# Patient Record
Sex: FEMALE | Race: WHITE | NOT HISPANIC OR LATINO | Employment: OTHER | URBAN - METROPOLITAN AREA
[De-identification: names, ages, dates, MRNs, and addresses within clinical notes are randomized per-mention and may not be internally consistent; named-entity substitution may affect disease eponyms.]

---

## 2017-01-16 ENCOUNTER — ALLSCRIPTS OFFICE VISIT (OUTPATIENT)
Dept: OTHER | Facility: OTHER | Age: 67
End: 2017-01-16

## 2017-01-18 ENCOUNTER — GENERIC CONVERSION - ENCOUNTER (OUTPATIENT)
Dept: OTHER | Facility: OTHER | Age: 67
End: 2017-01-18

## 2017-01-25 ENCOUNTER — ALLSCRIPTS OFFICE VISIT (OUTPATIENT)
Dept: OTHER | Facility: OTHER | Age: 67
End: 2017-01-25

## 2017-01-25 ENCOUNTER — HOSPITAL ENCOUNTER (OUTPATIENT)
Dept: RADIOLOGY | Facility: HOSPITAL | Age: 67
Discharge: HOME | End: 2017-01-25
Payer: MEDICARE

## 2017-01-25 DIAGNOSIS — J20.9 ACUTE BRONCHITIS: ICD-10-CM

## 2017-01-25 PROCEDURE — 71020 HB CHEST X-RAY 2VW FRONTAL&LATL: CPT

## 2017-01-27 ENCOUNTER — GENERIC CONVERSION - ENCOUNTER (OUTPATIENT)
Dept: OTHER | Facility: OTHER | Age: 67
End: 2017-01-27

## 2017-02-14 ENCOUNTER — GENERIC CONVERSION - ENCOUNTER (OUTPATIENT)
Dept: OTHER | Facility: OTHER | Age: 67
End: 2017-02-14

## 2017-02-24 ENCOUNTER — APPOINTMENT (OUTPATIENT)
Dept: LAB | Facility: MEDICAL CENTER | Age: 67
End: 2017-02-24
Payer: MEDICARE

## 2017-02-24 DIAGNOSIS — E11.65 TYPE 2 DIABETES MELLITUS WITH HYPERGLYCEMIA (HCC): ICD-10-CM

## 2017-02-24 DIAGNOSIS — E78.00 PURE HYPERCHOLESTEROLEMIA: ICD-10-CM

## 2017-02-24 DIAGNOSIS — R63.4 ABNORMAL WEIGHT LOSS: ICD-10-CM

## 2017-02-24 DIAGNOSIS — I10 ESSENTIAL (PRIMARY) HYPERTENSION: ICD-10-CM

## 2017-02-24 LAB
ALBUMIN SERPL BCP-MCNC: 3.7 G/DL (ref 3.5–5)
ALP SERPL-CCNC: 57 U/L (ref 46–116)
ALT SERPL W P-5'-P-CCNC: 21 U/L (ref 12–78)
ANION GAP SERPL CALCULATED.3IONS-SCNC: 9 MMOL/L (ref 4–13)
AST SERPL W P-5'-P-CCNC: 10 U/L (ref 5–45)
BILIRUB SERPL-MCNC: 0.56 MG/DL (ref 0.2–1)
BUN SERPL-MCNC: 13 MG/DL (ref 5–25)
CALCIUM SERPL-MCNC: 8.9 MG/DL (ref 8.3–10.1)
CHLORIDE SERPL-SCNC: 104 MMOL/L (ref 100–108)
CHOLEST SERPL-MCNC: 168 MG/DL (ref 50–200)
CO2 SERPL-SCNC: 28 MMOL/L (ref 21–32)
CREAT SERPL-MCNC: 0.73 MG/DL (ref 0.6–1.3)
EST. AVERAGE GLUCOSE BLD GHB EST-MCNC: 194 MG/DL
GFR SERPL CREATININE-BSD FRML MDRD: >60 ML/MIN/1.73SQ M
GLUCOSE SERPL-MCNC: 130 MG/DL (ref 65–140)
HBA1C MFR BLD: 8.4 % (ref 4.2–6.3)
HCV AB SER QL: NORMAL
HDLC SERPL-MCNC: 51 MG/DL (ref 40–60)
LDLC SERPL CALC-MCNC: 84 MG/DL (ref 0–100)
POTASSIUM SERPL-SCNC: 3.6 MMOL/L (ref 3.5–5.3)
PROT SERPL-MCNC: 6.8 G/DL (ref 6.4–8.2)
SODIUM SERPL-SCNC: 141 MMOL/L (ref 136–145)
TRIGL SERPL-MCNC: 165 MG/DL

## 2017-02-24 PROCEDURE — 83036 HEMOGLOBIN GLYCOSYLATED A1C: CPT

## 2017-02-24 PROCEDURE — 80061 LIPID PANEL: CPT

## 2017-02-24 PROCEDURE — 36415 COLL VENOUS BLD VENIPUNCTURE: CPT

## 2017-02-24 PROCEDURE — 86803 HEPATITIS C AB TEST: CPT

## 2017-02-24 PROCEDURE — 80053 COMPREHEN METABOLIC PANEL: CPT

## 2017-03-01 ENCOUNTER — ALLSCRIPTS OFFICE VISIT (OUTPATIENT)
Dept: OTHER | Facility: OTHER | Age: 67
End: 2017-03-01

## 2017-05-17 ENCOUNTER — GENERIC CONVERSION - ENCOUNTER (OUTPATIENT)
Dept: OTHER | Facility: OTHER | Age: 67
End: 2017-05-17

## 2017-05-24 ENCOUNTER — GENERIC CONVERSION - ENCOUNTER (OUTPATIENT)
Dept: OTHER | Facility: OTHER | Age: 67
End: 2017-05-24

## 2017-06-01 DIAGNOSIS — E78.00 PURE HYPERCHOLESTEROLEMIA: ICD-10-CM

## 2017-06-01 DIAGNOSIS — E55.9 VITAMIN D DEFICIENCY: ICD-10-CM

## 2017-06-01 DIAGNOSIS — I10 ESSENTIAL (PRIMARY) HYPERTENSION: ICD-10-CM

## 2017-06-01 DIAGNOSIS — E11.65 TYPE 2 DIABETES MELLITUS WITH HYPERGLYCEMIA (HCC): ICD-10-CM

## 2017-06-09 ENCOUNTER — TRANSCRIBE ORDERS (OUTPATIENT)
Dept: ADMINISTRATIVE | Facility: HOSPITAL | Age: 67
End: 2017-06-09

## 2017-06-09 ENCOUNTER — APPOINTMENT (OUTPATIENT)
Dept: LAB | Facility: MEDICAL CENTER | Age: 67
End: 2017-06-09
Payer: MEDICARE

## 2017-06-09 DIAGNOSIS — E11.65 TYPE 2 DIABETES MELLITUS WITH HYPERGLYCEMIA (HCC): ICD-10-CM

## 2017-06-09 DIAGNOSIS — E78.00 PURE HYPERCHOLESTEROLEMIA: ICD-10-CM

## 2017-06-09 LAB
ALBUMIN SERPL BCP-MCNC: 3.8 G/DL (ref 3.5–5)
ALP SERPL-CCNC: 50 U/L (ref 46–116)
ALT SERPL W P-5'-P-CCNC: 24 U/L (ref 12–78)
ANION GAP SERPL CALCULATED.3IONS-SCNC: 8 MMOL/L (ref 4–13)
AST SERPL W P-5'-P-CCNC: 18 U/L (ref 5–45)
BILIRUB SERPL-MCNC: 0.46 MG/DL (ref 0.2–1)
BUN SERPL-MCNC: 20 MG/DL (ref 5–25)
CALCIUM SERPL-MCNC: 9.2 MG/DL (ref 8.3–10.1)
CHLORIDE SERPL-SCNC: 107 MMOL/L (ref 100–108)
CHOLEST SERPL-MCNC: 125 MG/DL (ref 50–200)
CO2 SERPL-SCNC: 27 MMOL/L (ref 21–32)
CREAT SERPL-MCNC: 0.62 MG/DL (ref 0.6–1.3)
EST. AVERAGE GLUCOSE BLD GHB EST-MCNC: 183 MG/DL
GFR SERPL CREATININE-BSD FRML MDRD: >60 ML/MIN/1.73SQ M
GLUCOSE P FAST SERPL-MCNC: 80 MG/DL (ref 65–99)
HBA1C MFR BLD: 8 % (ref 4.2–6.3)
HDLC SERPL-MCNC: 42 MG/DL (ref 40–60)
LDLC SERPL CALC-MCNC: 67 MG/DL (ref 0–100)
POTASSIUM SERPL-SCNC: 3.9 MMOL/L (ref 3.5–5.3)
PROT SERPL-MCNC: 7.1 G/DL (ref 6.4–8.2)
SODIUM SERPL-SCNC: 142 MMOL/L (ref 136–145)
TRIGL SERPL-MCNC: 78 MG/DL

## 2017-06-09 PROCEDURE — 80053 COMPREHEN METABOLIC PANEL: CPT

## 2017-06-09 PROCEDURE — 83036 HEMOGLOBIN GLYCOSYLATED A1C: CPT

## 2017-06-09 PROCEDURE — 80061 LIPID PANEL: CPT

## 2017-06-09 PROCEDURE — 36415 COLL VENOUS BLD VENIPUNCTURE: CPT

## 2017-06-14 ENCOUNTER — ALLSCRIPTS OFFICE VISIT (OUTPATIENT)
Dept: OTHER | Facility: OTHER | Age: 67
End: 2017-06-14

## 2017-07-13 ENCOUNTER — GENERIC CONVERSION - ENCOUNTER (OUTPATIENT)
Dept: OTHER | Facility: OTHER | Age: 67
End: 2017-07-13

## 2017-08-31 ENCOUNTER — GENERIC CONVERSION - ENCOUNTER (OUTPATIENT)
Dept: OTHER | Facility: OTHER | Age: 67
End: 2017-08-31

## 2017-08-31 ENCOUNTER — CONVERSION ENCOUNTER (OUTPATIENT)
Dept: MAMMOGRAPHY | Facility: CLINIC | Age: 67
End: 2017-08-31

## 2017-09-14 DIAGNOSIS — I10 ESSENTIAL (PRIMARY) HYPERTENSION: ICD-10-CM

## 2017-09-14 DIAGNOSIS — E11.65 TYPE 2 DIABETES MELLITUS WITH HYPERGLYCEMIA (HCC): ICD-10-CM

## 2017-09-14 DIAGNOSIS — E78.00 PURE HYPERCHOLESTEROLEMIA: ICD-10-CM

## 2017-09-19 ENCOUNTER — GENERIC CONVERSION - ENCOUNTER (OUTPATIENT)
Dept: OTHER | Facility: OTHER | Age: 67
End: 2017-09-19

## 2017-10-02 ENCOUNTER — TRANSCRIBE ORDERS (OUTPATIENT)
Dept: ADMINISTRATIVE | Facility: HOSPITAL | Age: 67
End: 2017-10-02

## 2017-10-02 ENCOUNTER — APPOINTMENT (OUTPATIENT)
Dept: LAB | Facility: MEDICAL CENTER | Age: 67
End: 2017-10-02
Payer: MEDICARE

## 2017-10-02 DIAGNOSIS — E78.00 PURE HYPERCHOLESTEROLEMIA: Primary | ICD-10-CM

## 2017-10-02 DIAGNOSIS — E78.00 PURE HYPERCHOLESTEROLEMIA: ICD-10-CM

## 2017-10-02 DIAGNOSIS — E11.65 TYPE 2 DIABETES MELLITUS WITH HYPERGLYCEMIA (HCC): ICD-10-CM

## 2017-10-02 DIAGNOSIS — I10 ESSENTIAL (PRIMARY) HYPERTENSION: ICD-10-CM

## 2017-10-02 LAB
ALBUMIN SERPL BCP-MCNC: 3.8 G/DL (ref 3.5–5)
ALP SERPL-CCNC: 49 U/L (ref 46–116)
ALT SERPL W P-5'-P-CCNC: 14 U/L (ref 12–78)
ANION GAP SERPL CALCULATED.3IONS-SCNC: 6 MMOL/L (ref 4–13)
AST SERPL W P-5'-P-CCNC: 10 U/L (ref 5–45)
BILIRUB SERPL-MCNC: 0.4 MG/DL (ref 0.2–1)
BUN SERPL-MCNC: 14 MG/DL (ref 5–25)
CALCIUM SERPL-MCNC: 8.9 MG/DL (ref 8.3–10.1)
CHLORIDE SERPL-SCNC: 104 MMOL/L (ref 100–108)
CHOLEST SERPL-MCNC: 127 MG/DL (ref 50–200)
CO2 SERPL-SCNC: 27 MMOL/L (ref 21–32)
CREAT SERPL-MCNC: 0.65 MG/DL (ref 0.6–1.3)
EST. AVERAGE GLUCOSE BLD GHB EST-MCNC: 154 MG/DL
GFR SERPL CREATININE-BSD FRML MDRD: 93 ML/MIN/1.73SQ M
GLUCOSE P FAST SERPL-MCNC: 88 MG/DL (ref 65–99)
HBA1C MFR BLD: 7 % (ref 4.2–6.3)
HDLC SERPL-MCNC: 48 MG/DL (ref 40–60)
LDLC SERPL CALC-MCNC: 61 MG/DL (ref 0–100)
POTASSIUM SERPL-SCNC: 3.8 MMOL/L (ref 3.5–5.3)
PROT SERPL-MCNC: 7.1 G/DL (ref 6.4–8.2)
SODIUM SERPL-SCNC: 137 MMOL/L (ref 136–145)
TRIGL SERPL-MCNC: 92 MG/DL

## 2017-10-02 PROCEDURE — 80053 COMPREHEN METABOLIC PANEL: CPT

## 2017-10-02 PROCEDURE — 36415 COLL VENOUS BLD VENIPUNCTURE: CPT

## 2017-10-02 PROCEDURE — 80061 LIPID PANEL: CPT

## 2017-10-02 PROCEDURE — 83036 HEMOGLOBIN GLYCOSYLATED A1C: CPT

## 2017-10-06 ENCOUNTER — ALLSCRIPTS OFFICE VISIT (OUTPATIENT)
Dept: OTHER | Facility: OTHER | Age: 67
End: 2017-10-06

## 2017-12-20 ENCOUNTER — GENERIC CONVERSION - ENCOUNTER (OUTPATIENT)
Dept: OTHER | Facility: OTHER | Age: 67
End: 2017-12-20

## 2018-01-09 NOTE — RESULT NOTES
Message   Recorded as Task   Date: 01/27/2017 08:19 AM, Created By: Mauricio Rodriguez   Task Name: Follow Up   Assigned To: Nicol Franks   Regarding Patient: Chavo Brown, Status: Active   CommentGypsy Wildwood - 27 Jan 2017 8:19 AM     TASK CREATED  Chest xray didn't show any pneumonia  Hope the inhaler is helping her  KristoferAshleigh - 27 Jan 2017 3:29 PM     TASK EDITED  Spoke to the patient's  told him that the patient's x-ray came back negative for pneumonia  Patient's  said he would relay the message

## 2018-01-09 NOTE — MISCELLANEOUS
Message   Date: 13 Jul 2017 2:52 PM EST, Recorded By: Gogo Greer For: Don Guerra   Caller: Valery Leach, Self   Phone: [phone] (Home), [phone] (Work)   Reason: Medical Complaint   home blood sugar results have been good <100, 2x 102 & 105, instructed to decrease Lantus to 30U HS, will continue to monitor blood sugars at home        Active Problems    1  Allergic rhinitis (477 9) (J30 9)   2  Cervical cancer screening (V76 2) (Z12 4)   3  Colonoscopy (Fiberoptic) Screening   4  Encounter for screening mammogram for malignant neoplasm of breast (V76 12)   (Z12 31)   5  Fibrocystic breast disease, unspecified laterality (610 1) (N60 19)   6  Gastroesophageal reflux disease with esophagitis (530 11) (K21 0)   7  Glaucoma screening (V80 1) (Z13 5)   8  Hypercholesterolemia (272 0) (E78 00)   9  Hypertension (401 9) (I10)   10  Need for pneumococcal vaccination (V03 82) (Z23)   11  Screening for diabetic retinopathy (V80 2) (Z13 5)   12  Screening for genitourinary condition (V81 6) (Z13 89)   13  Type 2 diabetes mellitus with hyperglycemia (250 00) (E11 65)   14  Vitamin D deficiency (268 9) (E55 9)    Current Meds   1  FreeStyle Freedom Lite w/Device Kit; TEST BS BID; Therapy: 80Nce7518 to (Evaluate:22Oij5969)  Requested for: 01Aug2013; Last   Rx:01Aug2013 Ordered   2  FreeStyle Lancets Miscellaneous; TEST BS BID; Therapy: 30Eww6044 to (Evaluate:23Jan2017)  Requested for: 59Csc2371; Last   Rx:95Gzw7698 Ordered   3  FreeStyle Lite Test In Vitro Strip; TEST BLOOD SUGARS BID; Therapy: 03Wqh6934 to (Evaluate:23Jan2017)  Requested for: 27Efs6429; Last   Rx:88Geb4585 Ordered   4  Januvia 100 MG Oral Tablet; TAKE 1 TABLET DAILY; Therapy: 49WYU7214 to (Evaluate:32Phc2110)  Requested for: 61XKZ8781; Last   Rx:11Jan2017 Ordered   5  Lantus SoloStar 100 UNIT/ML Subcutaneous Solution Pen-injector; INJECT 30 UNITs at    Bedtime;    Therapy: 77KTU3693 to ([phone])  Requested for: 02VTB2343 Recorded   6  Lisinopril-Hydrochlorothiazide 20-12 5 MG Oral Tablet; Take 1 tablet daily  Requested   for: 69Pph0929; Last Rx:65Xqp6343 Ordered   7  Meclizine HCl - 12 5 MG Oral Tablet; TAKE 3 TABLETS Q6H PRN  Requested for:   89GXV9825; Last Rx:31Ekr1564 Ordered   8  MetFORMIN HCl - 500 MG Oral Tablet; TAKE 2 TABLETS TWICE DAILY  Requested for:   10LOZ0393; Last Rx:20Xmx3766 Ordered   9  Pen Needles 5/16" 31G X 8 MM Miscellaneous; inject Lantus at bedtime; Therapy: 44YFQ9702 to (Evaluate:57Zon7661)  Requested for: 42JHA1616; Last   Rx:71Utu6641 Ordered   10  Rosuvastatin Calcium 10 MG Oral Tablet; Take 1 tablet daily; Therapy: 00DXT1546 to (Evaluate:10Kqp4810)  Requested for: 40BOS1479; Last    AR:98FQS6071 Ordered   11  Tylenol TABS; Therapy: (Jessi Fresh) to Recorded    Allergies    1   Actos TABS    Signatures   Electronically signed by : HAN Luna ; Jul 19 2017  9:18AM EST                       (Author)

## 2018-01-11 NOTE — MISCELLANEOUS
Message   Date: 02 Dec 2016 11:12 AM EST, Recorded By: Kojo Dean For: MariDon   Caller: Marciano Burch, Self   Phone: [phone] (Home), [phone] (Work)   Reason: General Medical Question    Patient called stating that she took one Pravastatin and she had terrible pain in her legs  She does not want to continue on this  She wanted to see if her insurance would allow her to go back on Crestor  She is in the donut hole and that is why this had to be switched  Patient checked with her insurance and they said if she gets the Generic of Crestor it would only cost $12      As per Dr Saba Thomas, it would be okay to switch the Pravastatin to Rosuvastatin 10mg daily      Pt understood        Active Problems    1  Abnormal weight loss (783 21) (R63 4)   2  Allergic rhinitis (477 9) (J30 9)   3  Cervical cancer screening (V76 2) (Z12 4)   4  Colonoscopy (Fiberoptic) Screening   5  Encounter for screening mammogram for malignant neoplasm of breast (V76 12)   (Z12 31)   6  Fibrocystic breast disease, unspecified laterality (610 1) (N60 19)   7  Gastroesophageal reflux disease with esophagitis (530 11) (K21 0)   8  Glaucoma screening (V80 1) (Z13 5)   9  Hypercholesterolemia (272 0) (E78 00)   10  Hypertension (401 9) (I10)   11  Injury of foot (959 7) (S99 929A)   12  Injury of left hand (959 4) (S69 92XA)   13  Need for prophylactic vaccination and inoculation against influenza (V04 81) (Z23)   14  Screening for diabetic retinopathy (V80 2) (Z13 5)   15  Screening for genitourinary condition (V81 6) (Z13 89)   16  Type 2 diabetes mellitus with hyperglycemia (250 00) (E11 65)   17  Vitamin D deficiency (268 9) (E55 9)    Current Meds   1  FreeStyle Freedom Lite w/Device Kit; TEST BS BID; Therapy: 34Att4597 to (Evaluate:31Aug2013)  Requested for: 01Aug2013; Last   Rx:77Qjr5516 Ordered   2  FreeStyle Lancets Miscellaneous; TEST BS BID;    Therapy: 61Bsr5701 to (Evaluate:23Jan2017)  Requested for: 74Sev5302; Last   Rx:88Yeu5126 Ordered   3  FreeStyle Lite Test In Vitro Strip; TEST BLOOD SUGARS BID; Therapy: 94Vva4903 to (Evaluate:23Jan2017)  Requested for: 87Ddf3278; Last   Rx:00Asa1762 Ordered   4  Januvia 100 MG Oral Tablet; TAKE 1 TABLET DAILY; Therapy: 35TCT2784 to (Evaluate:10Jan2017)  Requested for: 85VFZ6818; Last   Rx:74Vfp2988 Ordered   5  Lantus SoloStar 100 UNIT/ML Subcutaneous Solution Pen-injector; INJECT 35 UNITS   AT  Bedtime; Therapy: 02IVT0150 to (Evaluate:18Feb2017)  Requested for: 48Qlw4733; Last   Rx:38Wmx6818 Ordered   6  Lisinopril-Hydrochlorothiazide 20-12 5 MG Oral Tablet; Take 1 tablet daily  Requested   for: 75VUH9750; Last Rx:04Roe1060; Status: ACTIVE - Transmit to [facility] Ordered   7  Meclizine HCl - 12 5 MG Oral Tablet; TAKE 3 TABLETS Q6H PRN  Requested for:   27CYI7476; Last Rx:26Nov2014 Ordered   8  MetFORMIN HCl - 500 MG Oral Tablet; TAKE 2 TABLETS TWICE DAILY  Requested for:   28Apr2016; Last Rx:28Apr2016 Ordered   9  Mupirocin 2 % External Ointment; APPLY SPARINGLY TO AFFECTED AREA(S) TWICE   DAILY; Therapy: 90MFH2196 to (Last Oxana Killings)  Requested for: 26Oct2016 Ordered   10  Pen Needles 5/16" 31G X 8 MM Miscellaneous; inject Lantus at bedtime; Therapy: 95YEP9096 to (26 [phone])  Requested for: 81LAO0590; Last    Rx:14Nov2016 Ordered   11  Pravastatin Sodium 40 MG Oral Tablet; TAKE 1 TABLET DAILY; Therapy: 93IXH2410 to (Viraj Smith)  Requested for: 23Nov2016; Last    Rx:23Nov2016 Ordered   12  Tylenol TABS; Therapy: (Louis Elise) to Recorded   13  Vitamin D 2000 UNIT Oral Capsule; take 1 capsule once daily; Therapy: 17JRK2247 to (Evaluate:28Nov2013) Recorded    Allergies    1  Actos TABS    Plan  Hypercholesterolemia    · Pravastatin Sodium 40 MG Oral Tablet   · Rosuvastatin Calcium 10 MG Oral Tablet;  Take 1 tablet daily    Signatures   Electronically signed by : HAN Noble ; Dec  5 2016 9:37AM EST                       (Author)

## 2018-01-11 NOTE — RESULT NOTES
Message  I spoke to her regarding 5mm foreign body (sea urchin tentacle) in left 3rd digit, she notes her foot pain is improved and the hand pain is unchanged   Our staff spoke to Dr Angel Quintanilla office and they were able to coordinate an office visit with the patient for the management of the same      Plan  Injury of left hand    · 2 - Adonis Austin MD, Henrry Orozco  (Orthopedic Surgery) Physician Referral  Consult  Status: Active   Requested for: 33VFC2900  Care Summary provided  : Yes    Signatures   Electronically signed by : HAN Flores ; Oct 28 2016  1:32PM EST                       (Author)

## 2018-01-13 VITALS
WEIGHT: 158.25 LBS | DIASTOLIC BLOOD PRESSURE: 60 MMHG | RESPIRATION RATE: 15 BRPM | SYSTOLIC BLOOD PRESSURE: 112 MMHG | TEMPERATURE: 99.1 F | HEART RATE: 80 BPM | BODY MASS INDEX: 29.88 KG/M2 | HEIGHT: 61 IN

## 2018-01-13 VITALS
DIASTOLIC BLOOD PRESSURE: 68 MMHG | HEART RATE: 82 BPM | SYSTOLIC BLOOD PRESSURE: 123 MMHG | WEIGHT: 150 LBS | TEMPERATURE: 98 F | HEIGHT: 61 IN | BODY MASS INDEX: 28.32 KG/M2 | RESPIRATION RATE: 15 BRPM

## 2018-01-13 VITALS
TEMPERATURE: 98.6 F | SYSTOLIC BLOOD PRESSURE: 130 MMHG | DIASTOLIC BLOOD PRESSURE: 70 MMHG | BODY MASS INDEX: 30.3 KG/M2 | RESPIRATION RATE: 15 BRPM | HEART RATE: 100 BPM | WEIGHT: 160.5 LBS | HEIGHT: 61 IN

## 2018-01-13 VITALS
DIASTOLIC BLOOD PRESSURE: 78 MMHG | WEIGHT: 154 LBS | BODY MASS INDEX: 29.07 KG/M2 | RESPIRATION RATE: 15 BRPM | TEMPERATURE: 98.6 F | HEART RATE: 84 BPM | SYSTOLIC BLOOD PRESSURE: 110 MMHG | HEIGHT: 61 IN

## 2018-01-14 VITALS
BODY MASS INDEX: 30.21 KG/M2 | DIASTOLIC BLOOD PRESSURE: 60 MMHG | RESPIRATION RATE: 15 BRPM | HEIGHT: 61 IN | TEMPERATURE: 99.4 F | SYSTOLIC BLOOD PRESSURE: 128 MMHG | HEART RATE: 82 BPM | WEIGHT: 160 LBS

## 2018-01-15 ENCOUNTER — GENERIC CONVERSION - ENCOUNTER (OUTPATIENT)
Dept: OTHER | Facility: OTHER | Age: 68
End: 2018-01-15

## 2018-01-15 NOTE — MISCELLANEOUS
Message     Date: 18 Jan 2017 1:49 PM EST, Recorded By: Yessica Kauffman For: Trav Paisley: Gomez Lee, Self   Phone: [phone] (Home), [phone] (Work)   Reason: Medical Complaint   Patient seen in offfice Monday, now has fever of 99 3 and last night 101, cough and congestion continue and feels like going into chest       Dr Bebe Bustillos will prescribe a Z-Karl which will be sent to pharmacy  Patient will  medication and take as directed  Active Problems    1  Abnormal weight loss (783 21) (R63 4)   2  Acute upper respiratory infection (465 9) (J06 9)   3  Allergic rhinitis (477 9) (J30 9)   4  Cervical cancer screening (V76 2) (Z12 4)   5  Colonoscopy (Fiberoptic) Screening   6  Encounter for screening mammogram for malignant neoplasm of breast (V76 12)   (Z12 31)   7  Fibrocystic breast disease, unspecified laterality (610 1) (N60 19)   8  Gastroesophageal reflux disease with esophagitis (530 11) (K21 0)   9  Glaucoma screening (V80 1) (Z13 5)   10  Hypercholesterolemia (272 0) (E78 00)   11  Hypertension (401 9) (I10)   12  Injury of foot (959 7) (S99 929A)   13  Injury of left hand (959 4) (S69 92XA)   14  Need for prophylactic vaccination and inoculation against influenza (V04 81) (Z23)   15  Screening for diabetic retinopathy (V80 2) (Z13 5)   16  Screening for genitourinary condition (V81 6) (Z13 89)   17  Type 2 diabetes mellitus with hyperglycemia (250 00) (E11 65)   18  Vitamin D deficiency (268 9) (E55 9)    Current Meds   1  Azithromycin 250 MG Oral Tablet; TAKE 2 TABLETS ON DAY 1 THEN TAKE 1 TABLET A   DAY FOR 4 DAYS; Therapy: 65MGL2091 to (GMVLOMVU:72ZEB3724)  Requested for: 34USP9156; Last   Rx:18Jan2017 Ordered   2  Fluticasone Propionate 50 MCG/ACT Nasal Suspension; use 2 sprays in each nostril   once daily; Therapy: 06KLB8977 to (Last Rx:16Jan2017)  Requested for: 44CFS8846 Ordered   3  FreeStyle Freedom Lite w/Device Kit; TEST BS BID;    Therapy: 95ARP6967 to (Evaluate:31Aug2013)  Requested for: 01Aug2013; Last   Rx:01Aug2013 Ordered   4  FreeStyle Lancets Miscellaneous; TEST BS BID; Therapy: 01Aug2013 to (Evaluate:23Jan2017)  Requested for: 57Agl2101; Last   Rx:92Asu6946 Ordered   5  FreeStyle Lite Test In Vitro Strip; TEST BLOOD SUGARS BID; Therapy: 01Aug2013 to (Evaluate:23Jan2017)  Requested for: 48Kik5646; Last   Rx:36Acm0769 Ordered   6  Januvia 100 MG Oral Tablet; TAKE 1 TABLET DAILY; Therapy: 35EBF2824 to (Evaluate:10Jul2017)  Requested for: 66JMW7025; Last   Rx:11Jan2017 Ordered   7  Lantus SoloStar 100 UNIT/ML Subcutaneous Solution Pen-injector; INJECT 35 UNITS   AT  Bedtime; Therapy: 36HFX7404 to (Evaluate:18Feb2017)  Requested for: 10Zwp1626; Last   Rx:02Aug2016 Ordered   8  Lisinopril-Hydrochlorothiazide 20-12 5 MG Oral Tablet; Take 1 tablet daily  Requested   for: 90AYF1648; Last Rx:53Vgq2595; Status: ACTIVE - Transmit to [facility] Ordered   9  Meclizine HCl - 12 5 MG Oral Tablet; TAKE 3 TABLETS Q6H PRN  Requested for:   70MMM1223; Last Rx:26Nov2014 Ordered   10  MetFORMIN HCl - 500 MG Oral Tablet; TAKE 2 TABLETS TWICE DAILY  Requested for:    32CLL1713; Last Rx:37Ygf3403 Ordered   11  Pen Needles 5/16" 31G X 8 MM Miscellaneous; inject Lantus at bedtime; Therapy: 90LSU8125 to (Jory Ukrainian)  Requested for: 09DSA6407; Last    Rx:14Nov2016 Ordered   12  Rosuvastatin Calcium 10 MG Oral Tablet; Take 1 tablet daily; Therapy: 70MJL1229 to (Evorn Found)  Requested for: 94HTD9956; Last    Rx:07Ayc1509 Ordered   13  Tylenol TABS; Therapy: (Roly Matas) to Recorded    Allergies    1   Actos TABS    Signatures   Electronically signed by : HAN Nelson ; Jan 18 2017  3:44PM EST                       (Author)

## 2018-01-16 NOTE — MISCELLANEOUS
Message   Date: 24 May 2017 4:43 PM EST, Recorded By: Yusuf iDaz For: Don Guerra   Caller: Gomez Lee, Self   Phone: [phone] (Home), [phone] (Work)   Reason: Medical Complaint   while using rowing machine at the gym slid off the back, hit her butt on the bar & rolled to the side  buttocks area is sore, no sign of bruising  will watch for a few days, will hold off going to the gym for now, if no better will call back to make an appointment  Active Problems    1  Allergic rhinitis (477 9) (J30 9)   2  Cervical cancer screening (V76 2) (Z12 4)   3  Colonoscopy (Fiberoptic) Screening   4  Encounter for screening mammogram for malignant neoplasm of breast (V76 12)   (Z12 31)   5  Fibrocystic breast disease, unspecified laterality (610 1) (N60 19)   6  Gastroesophageal reflux disease with esophagitis (530 11) (K21 0)   7  Glaucoma screening (V80 1) (Z13 5)   8  Hypercholesterolemia (272 0) (E78 00)   9  Hypertension (401 9) (I10)   10  Need for pneumococcal vaccination (V03 82) (Z23)   11  Screening for diabetic retinopathy (V80 2) (Z13 5)   12  Screening for genitourinary condition (V81 6) (Z13 89)   13  Type 2 diabetes mellitus with hyperglycemia (250 00) (E11 65)   14  Vitamin D deficiency (268 9) (E55 9)    Current Meds   1  FreeStyle Freedom Lite w/Device Kit; TEST BS BID; Therapy: 01Aug2013 to (Evaluate:31Aug2013)  Requested for: 01Aug2013; Last   Rx:01Aug2013 Ordered   2  FreeStyle Lancets Miscellaneous; TEST BS BID; Therapy: 01Aug2013 to (Evaluate:23Jan2017)  Requested for: 66Qtp6435; Last   Rx:79Pnp0958 Ordered   3  FreeStyle Lite Test In Vitro Strip; TEST BLOOD SUGARS BID; Therapy: 01Aug2013 to (Evaluate:23Jan2017)  Requested for: 42Qin1381; Last   Rx:59Zqm3059 Ordered   4  Januvia 100 MG Oral Tablet; TAKE 1 TABLET DAILY; Therapy: 18XZP8250 to (Evaluate:81Uwk7437)  Requested for: 95FCS9905; Last   Rx:11Jan2017 Ordered   5   Lantus SoloStar 100 UNIT/ML Subcutaneous Solution Pen-injector; INJECT 40 UNITS   AT  Bedtime; Therapy: 31NWI6253 to [phone])  Requested for: 20Mar2017; Last   Rx:20Mar2017 Ordered   6  Lisinopril-Hydrochlorothiazide 20-12 5 MG Oral Tablet; Take 1 tablet daily  Requested   for: 14Ydu0965; Last Rx:09Nte6207 Ordered   7  Meclizine HCl - 12 5 MG Oral Tablet; TAKE 3 TABLETS Q6H PRN  Requested for:   54XYD3667; Last Rx:26Nov2014 Ordered   8  MetFORMIN HCl - 500 MG Oral Tablet; TAKE 2 TABLETS TWICE DAILY  Requested for:   14LVH3221; Last Rx:61Qku6209 Ordered   9  Pen Needles 5/16" 31G X 8 MM Miscellaneous; inject Lantus at bedtime; Therapy: 25HDC4810 to (Evaluate:70Wsk9122)  Requested for: 62HNT3019; Last   Rx:82Vnr3860 Ordered   10  Rosuvastatin Calcium 10 MG Oral Tablet; Take 1 tablet daily; Therapy: 44NRA0444 to (Terri Tineo)  Requested for: 33TOA1127; Last    Rx:20Sdx1497 Ordered   11  Tylenol TABS; Therapy: (Jen Galindo) to Recorded    Allergies    1   Actos TABS    Signatures   Electronically signed by : HAN Snyder ; May 30 2017  9:01AM EST                       (Author)

## 2018-01-23 NOTE — MISCELLANEOUS
Message   Date: 15 Benji 2018 1:14 PM EST, Recorded By: Kory García For: MariDon   Caller: Troy Villar, Self   Phone: [phone] (Home), [phone] (Work)   Reason: Medical Complaint   patient states that she has had fever the last few days, cough, congestion and bodyache  Not feeling well at all      As per Dr Taye Pham, we can see her this morning for an acute appointment  Tried calling patient several times but she did not call back until after 1pm   She was instructed to go to the Urgent Care today for evaluation  She understood        Active Problems    1  Allergic rhinitis (477 9) (J30 9)   2  Cervical cancer screening (V76 2) (Z12 4)   3  Colonoscopy (Fiberoptic) Screening   4  Encounter for screening mammogram for malignant neoplasm of breast (V76 12)   (Z12 31)   5  Fibrocystic breast disease, unspecified laterality (610 1) (N60 19)   6  Gastroesophageal reflux disease with esophagitis (530 11) (K21 0)   7  Glaucoma screening (V80 1) (Z13 5)   8  Hypercholesterolemia (272 0) (E78 00)   9  Hypertension (401 9) (I10)   10  Need for pneumococcal vaccination (V03 82) (Z23)   11  Need for prophylactic vaccination and inoculation against influenza (V04 81) (Z23)   12  Screening for diabetic retinopathy (V80 2) (Z13 5)   13  Screening for genitourinary condition (V81 6) (Z13 89)   14  Type 2 diabetes mellitus with hyperglycemia (250 00) (E11 65)   15  Vitamin D deficiency (268 9) (E55 9)    Current Meds   1  FreeStyle Freedom Lite w/Device Kit; TEST BS BID; Therapy: 43Oig6732 to (Evaluate:28Kaw0694)  Requested for: 55Pev3948; Last   Rx:82Ert1983 Ordered   2  FreeStyle Lancets Miscellaneous; TEST BS BID; Therapy: 20Hxl0172 to (Evaluate:23Jan2017)  Requested for: 95Lph0643; Last   Rx:13Ndf3695 Ordered   3  FreeStyle Lite Test In Vitro Strip; TEST BLOOD SUGARS BID; Therapy: 23Fnl0302 to (Evaluate:20Jan2018)  Requested for: 28Vks7459; Last   Rx:07Ypt0635 Ordered   4   Januvia 100 MG Oral Tablet; TAKE 1 TABLET DAILY; Therapy: 92JPB8468 to (Evaluate:79Vhn6520)  Requested for: 59MPP1421; Last   Rx:12Jan2018 Ordered   5  Lantus SoloStar 100 UNIT/ML Subcutaneous Solution Pen-injector; INJECT 30 UNITs at    Bedtime; Therapy: 05KED1859 to (Evaluate:44Ewm0929)  Requested for: 01ILW6408; Last   Rx:02Nov2017 Ordered   6  Lisinopril-Hydrochlorothiazide 20-12 5 MG Oral Tablet; Take 1 tablet daily  Requested   for: 30Rtl2839; Last Rx:12Rdy4014 Ordered   7  Meclizine HCl - 12 5 MG Oral Tablet; TAKE 3 TABLETS Q6H PRN  Requested for:   62IBX8291; Last Rx:26Nov2014 Ordered   8  MetFORMIN HCl - 500 MG Oral Tablet; TAKE 2 TABLETS TWICE DAILY  Requested for:   32EBH0438; Last Rx:30Nov2017 Ordered   9  Pen Needles 5/16" 31G X 8 MM Miscellaneous; inject Lantus at bedtime; Therapy: 75JRU4893 to (Evaluate:01Apr2018)  Requested for: 65UKQ5723; Last   Rx:02Nov2017 Ordered   10  Rosuvastatin Calcium 10 MG Oral Tablet; Take 1 tablet daily; Therapy: 35VPI5747 to (Evaluate:56Jla3636)  Requested for: 56TAZ9896; Last    SP:95DZP5186 Ordered   11  Tylenol TABS; Therapy: (Afia Watt) to Recorded    Allergies    1   Actos TABS    Signatures   Electronically signed by : HAN Linn ; Benji 15 2018  5:13PM EST                       (Author)

## 2018-01-29 ENCOUNTER — TELEPHONE (OUTPATIENT)
Dept: INTERNAL MEDICINE CLINIC | Facility: CLINIC | Age: 68
End: 2018-01-29

## 2018-01-29 DIAGNOSIS — J01.00 ACUTE MAXILLARY SINUSITIS, RECURRENCE NOT SPECIFIED: Primary | ICD-10-CM

## 2018-01-29 RX ORDER — AMOXICILLIN AND CLAVULANATE POTASSIUM 875; 125 MG/1; MG/1
1 TABLET, FILM COATED ORAL EVERY 12 HOURS SCHEDULED
Qty: 14 TABLET | Refills: 0 | Status: SHIPPED | OUTPATIENT
Start: 2018-01-29 | End: 2018-02-05

## 2018-01-29 NOTE — TELEPHONE ENCOUNTER
Suzette Otero called complaining of headache, sinus pressure, and painful teeth and gums  She went to the dentist last week, and was told that her gums and teeth are fine  She's been taking Mucinex with no relief  Dr Deidra Coburn sent Augmentin BID for 7 days to the pharmacy

## 2018-02-09 ENCOUNTER — APPOINTMENT (OUTPATIENT)
Dept: LAB | Facility: MEDICAL CENTER | Age: 68
End: 2018-02-09
Payer: MEDICARE

## 2018-02-09 ENCOUNTER — TRANSCRIBE ORDERS (OUTPATIENT)
Dept: ADMINISTRATIVE | Facility: HOSPITAL | Age: 68
End: 2018-02-09

## 2018-02-09 PROCEDURE — 80053 COMPREHEN METABOLIC PANEL: CPT | Performed by: INTERNAL MEDICINE

## 2018-02-09 PROCEDURE — 36415 COLL VENOUS BLD VENIPUNCTURE: CPT | Performed by: INTERNAL MEDICINE

## 2018-02-09 PROCEDURE — 83036 HEMOGLOBIN GLYCOSYLATED A1C: CPT | Performed by: INTERNAL MEDICINE

## 2018-02-12 ENCOUNTER — OFFICE VISIT (OUTPATIENT)
Dept: INTERNAL MEDICINE CLINIC | Facility: CLINIC | Age: 68
End: 2018-02-12
Payer: MEDICARE

## 2018-02-12 VITALS
RESPIRATION RATE: 15 BRPM | DIASTOLIC BLOOD PRESSURE: 80 MMHG | TEMPERATURE: 98.3 F | HEIGHT: 61 IN | SYSTOLIC BLOOD PRESSURE: 138 MMHG | BODY MASS INDEX: 29.57 KG/M2 | WEIGHT: 156.6 LBS | HEART RATE: 84 BPM

## 2018-02-12 DIAGNOSIS — E55.9 VITAMIN D DEFICIENCY: ICD-10-CM

## 2018-02-12 DIAGNOSIS — E11.65 TYPE 2 DIABETES MELLITUS WITH HYPERGLYCEMIA, WITH LONG-TERM CURRENT USE OF INSULIN (HCC): ICD-10-CM

## 2018-02-12 DIAGNOSIS — Z79.4 TYPE 2 DIABETES MELLITUS WITH HYPERGLYCEMIA, WITH LONG-TERM CURRENT USE OF INSULIN (HCC): ICD-10-CM

## 2018-02-12 DIAGNOSIS — I10 ESSENTIAL HYPERTENSION: Primary | ICD-10-CM

## 2018-02-12 DIAGNOSIS — K21.00 GASTROESOPHAGEAL REFLUX DISEASE WITH ESOPHAGITIS: ICD-10-CM

## 2018-02-12 DIAGNOSIS — E78.00 HYPERCHOLESTEROLEMIA: ICD-10-CM

## 2018-02-12 PROCEDURE — 99214 OFFICE O/P EST MOD 30 MIN: CPT | Performed by: INTERNAL MEDICINE

## 2018-02-12 RX ORDER — LISINOPRIL AND HYDROCHLOROTHIAZIDE 20; 12.5 MG/1; MG/1
1 TABLET ORAL DAILY
COMMUNITY
End: 2018-03-06 | Stop reason: SDUPTHER

## 2018-02-12 RX ORDER — ACETAMINOPHEN 325 MG/1
TABLET ORAL
COMMUNITY
End: 2018-07-19

## 2018-02-12 RX ORDER — PEN NEEDLE, DIABETIC 31 GX5/16"
NEEDLE, DISPOSABLE MISCELLANEOUS DAILY
Qty: 100 EACH | Refills: 2 | Status: SHIPPED | OUTPATIENT
Start: 2018-02-12 | End: 2018-07-19

## 2018-02-12 RX ORDER — ROSUVASTATIN CALCIUM 10 MG/1
1 TABLET, COATED ORAL DAILY
COMMUNITY
Start: 2016-12-02 | End: 2018-03-06 | Stop reason: SDUPTHER

## 2018-02-12 RX ORDER — PEN NEEDLE, DIABETIC 31 GX5/16"
NEEDLE, DISPOSABLE MISCELLANEOUS
COMMUNITY
Start: 2015-02-25 | End: 2018-02-12 | Stop reason: SDUPTHER

## 2018-02-12 NOTE — PROGRESS NOTES
[facility] [place]      NAME: Elisa Mcdaniel  AGE: 78 y o  SEX: female  : 1950   MRN: [id number]    DATE: 2018  TIME: 4:28 PM    Assessment and Plan   1  Essential hypertension    Well controlled  - CBC and differential; Future  - Comprehensive metabolic panel    2  Hypercholesterolemia   well controlled  - Lipid panel    3  Type 2 diabetes mellitus with hyperglycemia, with long-term current use of insulin (HCC)   adequately controlled  The patient has to change from Lantus because of formulary issues  She will be started on Tresiba  - insulin degludec (TRESIBA) injection pen 100 units/mL; Inject 30 Units under the skin daily  Dispense: 5 pen; Refill: 5  - Insulin Pen Needle (PEN NEEDLES 31GX5/16") 31G X 8 MM MISC; by Does not apply route daily  Dispense: 100 each; Refill: 2  - Hemoglobin A1c; Future    4  Gastroesophageal reflux disease with esophagitis    Minimally symptomatic at present  5  Vitamin D deficiency    Not currently on replacement, check level  - Vitamin D 25 hydroxy     the patient is doing generally well  Her blood pressure and lipids are good  Her diabetic control is reasonable  She is exercising more regularly than she had been  She is trying to watch her diet  She will continue her current therapy and return for follow-up in 3 months  Return to office in: Three months    Chief Complaint    routine follow-up    History of Present Illness      the patient returned to the office for re-evaluation of type 2 diabetes, hypertension, and hypercholesterolemia  She has been feeling pretty well  She has no chest pain, shortness of breath, palpitations, or dizziness  She has had no symptoms ofhypoglycemia or hyperglycemia  She is tolerating her medications well      The following portions of the patient's history were reviewed and updated as appropriate: allergies, current medications, past family history, past medical history, past social history, past surgical history and problem list     Review of Systems   Review of Systems    Active Problem List     Patient Active Problem List   Diagnosis    Allergic rhinitis    Hypertension    Gastroesophageal reflux disease with esophagitis    Hypercholesterolemia    Type 2 diabetes mellitus with hyperglycemia (HCC)    Vitamin D deficiency       Objective   /80 (BP Location: Left arm, Patient Position: Sitting, Cuff Size: Standard)   Pulse 84   Temp 98 3 °F (36 8 °C) (Tympanic)   Resp 15   Ht 5' 1" (1 549 m)   Wt 71 kg (156 lb 9 6 oz)   BMI 29 59 kg/m²     Physical Exam   Constitutional: She is oriented to person, place, and time  She appears well-developed and well-nourished  HENT:   Head: Normocephalic and atraumatic  Neck: Neck supple  No JVD present  No tracheal deviation present  No thyromegaly present  Cardiovascular: Normal rate, regular rhythm, normal heart sounds and intact distal pulses  Exam reveals no gallop and no friction rub  No murmur heard  Pulmonary/Chest: Effort normal and breath sounds normal  She has no wheezes  She has no rales  She exhibits no tenderness  Abdominal: Soft  Bowel sounds are normal  She exhibits no distension and no mass  There is no tenderness  There is no rebound  Musculoskeletal: Normal range of motion  She exhibits no edema or tenderness  Lymphadenopathy:     She has no cervical adenopathy  Neurological: She is alert and oriented to person, place, and time  Skin: Skin is warm and dry  Psychiatric: She has a normal mood and affect         Pertinent Laboratory/Diagnostic Studies:  CBC:   Lab Results   Component Value Date/Time    WBC 9 56 11/21/2016 07:59 AM    WBC 7 83 09/03/2015 07:09 AM    RBC 4 51 11/21/2016 07:59 AM    RBC 4 47 09/03/2015 07:09 AM    HGB 13 4 11/21/2016 07:59 AM    HGB 13 0 09/03/2015 07:09 AM    HCT 39 5 11/21/2016 07:59 AM    HCT 38 8 09/03/2015 07:09 AM    MCV 88 11/21/2016 07:59 AM    MCV 87 09/03/2015 07:09 AM    MCH 29 7 11/21/2016 07:59 AM    MCH 29 1 09/03/2015 07:09 AM    MCHC 33 9 11/21/2016 07:59 AM    MCHC 33 5 09/03/2015 07:09 AM    RDW 12 9 11/21/2016 07:59 AM    RDW 12 9 09/03/2015 07:09 AM    MPV 11 6 11/21/2016 07:59 AM    MPV 11 2 09/03/2015 07:09 AM     11/21/2016 07:59 AM     09/03/2015 07:09 AM    NRBC 0 11/21/2016 07:59 AM    NEUTOPHILPCT 59 11/21/2016 07:59 AM    NEUTOPHILPCT 59 09/03/2015 07:09 AM    LYMPHOPCT 29 11/21/2016 07:59 AM    LYMPHOPCT 30 09/03/2015 07:09 AM    MONOPCT 8 11/21/2016 07:59 AM    MONOPCT 8 09/03/2015 07:09 AM    EOSPCT 4 11/21/2016 07:59 AM    EOSPCT 3 09/03/2015 07:09 AM    BASOPCT 0 11/21/2016 07:59 AM    BASOPCT 0 07/26/2014 03:20 PM    NEUTROABS 5 70 11/21/2016 07:59 AM    NEUTROABS 4 62 09/03/2015 07:09 AM    LYMPHSABS 2 75 11/21/2016 07:59 AM    LYMPHSABS 2 35 09/03/2015 07:09 AM    MONOSABS 0 74 11/21/2016 07:59 AM    MONOSABS 0 63 09/03/2015 07:09 AM    EOSABS 0 33 11/21/2016 07:59 AM    EOSABS 0 23 09/03/2015 07:09 AM     Chemistry Profile:   Lab Results   Component Value Date/Time     02/09/2018 09:08 AM     12/10/2015 07:52 AM    K 3 9 02/09/2018 09:08 AM    K 3 8 12/10/2015 07:52 AM     02/09/2018 09:08 AM     12/10/2015 07:52 AM    CO2 29 02/09/2018 09:08 AM    CO2 28 5 12/10/2015 07:52 AM    ANIONGAP 7 02/09/2018 09:08 AM    ANIONGAP 10 12/10/2015 07:52 AM    BUN 16 02/09/2018 09:08 AM    BUN 14 12/10/2015 07:52 AM    CREATININE 0 68 02/09/2018 09:08 AM    CREATININE 0 66 12/10/2015 07:52 AM    GLUCOSE 130 02/24/2017 07:48 AM    GLUCOSE 120 12/10/2015 07:52 AM    CALCIUM 9 0 02/09/2018 09:08 AM    CALCIUM 9 0 12/10/2015 07:52 AM    AST 11 02/09/2018 09:08 AM    AST 14 12/10/2015 07:52 AM    ALT 16 02/09/2018 09:08 AM    ALT 22 12/10/2015 07:52 AM    ALKPHOS 45 (L) 02/09/2018 09:08 AM    ALKPHOS 58 12/10/2015 07:52 AM    PROT 7 2 02/09/2018 09:08 AM    PROT 7 3 12/10/2015 07:52 AM    BILITOT 0 48 02/09/2018 09:08 AM    BILITOT 0 34 12/10/2015 07:52 AM    EGFR 91 02/09/2018 09:08 AM     Endocrine Studies:   Lab Results   Component Value Date/Time    HGBA1C 7 6 (H) 02/09/2018 09:08 AM    HGBA1C 8 3 (H) 12/10/2015 07:52 AM    WUL1AHJBQOQH 2 788 03/10/2016 07:35 AM    TRIG 92 10/02/2017 08:23 AM    TRIG 94 09/03/2015 07:09 AM    CHOL 127 10/02/2017 08:23 AM    CHOL 147 09/03/2015 07:09 AM    HDL 48 10/02/2017 08:23 AM    HDL 47 09/03/2015 07:09 AM    LDLCALC 61 10/02/2017 08:23 AM    LDLCALC 81 09/03/2015 07:09 AM    MQFA21GHYKCM 22 6 (L) 11/21/2016 07:59 AM    [id number] 23 9 (L) 09/03/2015 07:09 AM       Current Medications     Current Outpatient Prescriptions:     acetaminophen (TYLENOL) 325 mg tablet, Take by mouth, Disp: , Rfl:     insulin degludec (TRESIBA) injection pen 100 units/mL, Inject 30 Units under the skin daily, Disp: 5 pen, Rfl: 5    Insulin Pen Needle (PEN NEEDLES 31GX5/16") 31G X 8 MM MISC, by Does not apply route daily, Disp: 100 each, Rfl: 2    lisinopril-hydrochlorothiazide (PRINZIDE,ZESTORETIC) 20-12 5 MG per tablet, Take 1 tablet by mouth daily, Disp: , Rfl:     metFORMIN (GLUCOPHAGE) 500 mg tablet, Take 2 tablets by mouth 2 (two) times a day, Disp: , Rfl:     rosuvastatin (CRESTOR) 10 MG tablet, Take 1 tablet by mouth daily, Disp: , Rfl:     sitaGLIPtin (JANUVIA) 100 mg tablet, Take 1 tablet by mouth daily, Disp: , Rfl:     Carline Guzman MD

## 2018-03-06 DIAGNOSIS — E78.00 HYPERCHOLESTEROLEMIA: ICD-10-CM

## 2018-03-06 DIAGNOSIS — I10 ESSENTIAL HYPERTENSION: Primary | ICD-10-CM

## 2018-03-06 RX ORDER — ROSUVASTATIN CALCIUM 10 MG/1
10 TABLET, COATED ORAL DAILY
Qty: 30 TABLET | Refills: 5 | Status: SHIPPED | OUTPATIENT
Start: 2018-03-06 | End: 2018-03-22 | Stop reason: SINTOL

## 2018-03-06 RX ORDER — LISINOPRIL AND HYDROCHLOROTHIAZIDE 20; 12.5 MG/1; MG/1
1 TABLET ORAL DAILY
Qty: 30 TABLET | Refills: 5 | Status: SHIPPED | OUTPATIENT
Start: 2018-03-06 | End: 2018-08-29 | Stop reason: SDUPTHER

## 2018-03-22 ENCOUNTER — TELEPHONE (OUTPATIENT)
Dept: INTERNAL MEDICINE CLINIC | Facility: CLINIC | Age: 68
End: 2018-03-22

## 2018-04-04 DIAGNOSIS — Z79.4 TYPE 2 DIABETES MELLITUS WITHOUT COMPLICATION, WITH LONG-TERM CURRENT USE OF INSULIN (HCC): Primary | ICD-10-CM

## 2018-04-04 DIAGNOSIS — E11.9 TYPE 2 DIABETES MELLITUS WITHOUT COMPLICATION, WITH LONG-TERM CURRENT USE OF INSULIN (HCC): Primary | ICD-10-CM

## 2018-04-19 ENCOUNTER — OFFICE VISIT (OUTPATIENT)
Dept: INTERNAL MEDICINE CLINIC | Facility: CLINIC | Age: 68
End: 2018-04-19
Payer: MEDICARE

## 2018-04-19 VITALS
DIASTOLIC BLOOD PRESSURE: 80 MMHG | SYSTOLIC BLOOD PRESSURE: 150 MMHG | HEIGHT: 61 IN | RESPIRATION RATE: 15 BRPM | WEIGHT: 156.4 LBS | BODY MASS INDEX: 29.53 KG/M2 | HEART RATE: 88 BPM | TEMPERATURE: 98.1 F

## 2018-04-19 DIAGNOSIS — I10 ESSENTIAL HYPERTENSION: Primary | ICD-10-CM

## 2018-04-19 DIAGNOSIS — M79.2 NEURALGIA: ICD-10-CM

## 2018-04-19 DIAGNOSIS — Z79.4 TYPE 2 DIABETES MELLITUS WITH HYPERGLYCEMIA, WITH LONG-TERM CURRENT USE OF INSULIN (HCC): ICD-10-CM

## 2018-04-19 DIAGNOSIS — E55.9 VITAMIN D DEFICIENCY: ICD-10-CM

## 2018-04-19 DIAGNOSIS — E11.65 TYPE 2 DIABETES MELLITUS WITH HYPERGLYCEMIA, WITH LONG-TERM CURRENT USE OF INSULIN (HCC): ICD-10-CM

## 2018-04-19 DIAGNOSIS — E78.00 HYPERCHOLESTEROLEMIA: ICD-10-CM

## 2018-04-19 DIAGNOSIS — K21.00 GASTROESOPHAGEAL REFLUX DISEASE WITH ESOPHAGITIS: ICD-10-CM

## 2018-04-19 PROCEDURE — 99214 OFFICE O/P EST MOD 30 MIN: CPT | Performed by: INTERNAL MEDICINE

## 2018-04-19 RX ORDER — GABAPENTIN 300 MG/1
300 CAPSULE ORAL 2 TIMES DAILY
Qty: 60 CAPSULE | Refills: 0 | Status: SHIPPED | OUTPATIENT
Start: 2018-04-19 | End: 2018-05-22 | Stop reason: SDUPTHER

## 2018-04-19 NOTE — PROGRESS NOTES
[facility] [place]      NAME: Malinda Fontaine  AGE: 80 y o  SEX: female  : 1950   MRN: [id number]    DATE: 2018  TIME: 3:00 PM    Assessment and Plan   1  Essential hypertension  Well controlled  2  Type 2 diabetes mellitus with hyperglycemia, with long-term current use of insulin (HCC)    Adequately controlled on current therapy  Hemoglobin A1c next month  3  Hypercholesterolemia    Currently off Crestor because of leg symptoms  4  Vitamin D deficiency    On replacement  5  Gastroesophageal reflux disease with esophagitis    Currently stable on diet alone  6  Neuralgia    A trial of gabapentin   - gabapentin (NEURONTIN) 300 mg capsule; Take 1 capsule (300 mg total) by mouth 2 (two) times a day  Dispense: 60 capsule; Refill: 0      I believe that the patient's right leg pain is likely neuropathic  This could be related to sciatica  We discussed the possibility of MRI and/or EMG testing  The patient preferred trial of gabapentin  She will return as previously scheduled in May  Return to office in:   May  Chief Complaint     Right leg pain  History of Present Illness       The patient came to the office for evaluation of right leg pain  She says that this is been ongoing for about 6 weeks  She describes it is a shooting or tingling pain  She was concerned that it could be related to receive a statin and has stopped this  Despite being off of this for about 6 weeks no improvement has occurred  The patient does have some back pain but this is not been severe  There is no weakness of the leg  The patient has not fallen  She has had no issues with her bowel or bladder function  Otherwise, she has been feeling well  She has had no chest pain, shortness of breath,  Palpitations, or dizziness  She has had no symptoms of hyperglycemia or hypoglycemia  She is tolerating her medications well      The following portions of the patient's history were reviewed and updated as appropriate: allergies, current medications, past family history, past medical history, past social history, past surgical history and problem list     Review of Systems   Review of Systems   Constitutional: Negative  HENT: Negative for congestion, ear pain, postnasal drip, rhinorrhea, sore throat and trouble swallowing  Eyes: Negative for pain, discharge, redness and visual disturbance  Respiratory: Negative for cough, shortness of breath and wheezing  Cardiovascular: Negative  Gastrointestinal: Negative  Endocrine: Negative  Genitourinary: Negative for difficulty urinating, dysuria, frequency, hematuria and urgency  Musculoskeletal: Positive for back pain  Negative for arthralgias, gait problem, joint swelling and myalgias  Skin: Negative for rash  Neurological: Positive for numbness  Negative for dizziness, speech difficulty, weakness, light-headedness and headaches  Hematological: Negative  Psychiatric/Behavioral: Negative for confusion, decreased concentration, dysphoric mood and sleep disturbance  The patient is not nervous/anxious  Active Problem List     Patient Active Problem List   Diagnosis    Allergic rhinitis    Hypertension    Gastroesophageal reflux disease with esophagitis    Hypercholesterolemia    Type 2 diabetes mellitus with hyperglycemia (HCC)    Vitamin D deficiency    Neuralgia       Objective   /80 (BP Location: Left arm, Patient Position: Sitting, Cuff Size: Standard)   Pulse 88   Temp 98 1 °F (36 7 °C)   Resp 15   Ht 5' 1" (1 549 m)   Wt 70 9 kg (156 lb 6 4 oz)   BMI 29 55 kg/m²     Physical Exam   Constitutional: She is oriented to person, place, and time  She appears well-developed and well-nourished  HENT:   Head: Normocephalic and atraumatic  Neck: Neck supple  No JVD present  No tracheal deviation present  No thyromegaly present     Cardiovascular: Normal rate, regular rhythm, normal heart sounds and intact distal pulses  Exam reveals no gallop and no friction rub  No murmur heard  Pulmonary/Chest: Effort normal and breath sounds normal  She has no wheezes  She has no rales  She exhibits no tenderness  Abdominal: Soft  Bowel sounds are normal  She exhibits no distension and no mass  There is no tenderness  There is no rebound  Musculoskeletal: Normal range of motion  She exhibits no edema or tenderness  Lymphadenopathy:     She has no cervical adenopathy  Neurological: She is alert and oriented to person, place, and time  The strength in the patient's legs is normal bilaterally  Sensation is intact  Reflexes are plus 2/4 and symmetric  Toes are downgoing bilaterally  Skin: Skin is warm and dry  Psychiatric: She has a normal mood and affect  Pertinent Laboratory/Diagnostic Studies:    No recent lab work          Current Medications     Current Outpatient Prescriptions:     acetaminophen (TYLENOL) 325 mg tablet, Take by mouth, Disp: , Rfl:     insulin glargine (BASAGLAR KWIKPEN) injection pen 100 units/mL, Inject 0 3 mL (30 Units total) under the skin daily, Disp: 4 pen, Rfl: 0    Insulin Pen Needle (PEN NEEDLES 31GX5/16") 31G X 8 MM MISC, by Does not apply route daily, Disp: 100 each, Rfl: 2    lisinopril-hydrochlorothiazide (PRINZIDE,ZESTORETIC) 20-12 5 MG per tablet, Take 1 tablet by mouth daily, Disp: 30 tablet, Rfl: 5    metFORMIN (GLUCOPHAGE) 500 mg tablet, Take 2 tablets by mouth 2 (two) times a day, Disp: , Rfl:     sitaGLIPtin (JANUVIA) 100 mg tablet, Take 1 tablet by mouth daily, Disp: , Rfl:     gabapentin (NEURONTIN) 300 mg capsule, Take 1 capsule (300 mg total) by mouth 2 (two) times a day, Disp: 60 capsule, Rfl: 0        Darrian Betts MD

## 2018-04-30 ENCOUNTER — TELEPHONE (OUTPATIENT)
Dept: INTERNAL MEDICINE CLINIC | Facility: CLINIC | Age: 68
End: 2018-04-30

## 2018-04-30 ENCOUNTER — HOSPITAL ENCOUNTER (EMERGENCY)
Facility: HOSPITAL | Age: 68
Discharge: HOME | End: 2018-04-30
Attending: EMERGENCY MEDICINE | Admitting: EMERGENCY MEDICINE
Payer: MEDICARE

## 2018-04-30 ENCOUNTER — APPOINTMENT (EMERGENCY)
Dept: RADIOLOGY | Facility: HOSPITAL | Age: 68
End: 2018-04-30
Payer: MEDICARE

## 2018-04-30 VITALS
OXYGEN SATURATION: 98 % | TEMPERATURE: 98 F | HEART RATE: 86 BPM | SYSTOLIC BLOOD PRESSURE: 137 MMHG | RESPIRATION RATE: 18 BRPM | DIASTOLIC BLOOD PRESSURE: 70 MMHG

## 2018-04-30 DIAGNOSIS — S32.2XXA CLOSED FRACTURE OF COCCYX (HCC): Primary | ICD-10-CM

## 2018-04-30 PROCEDURE — 99283 EMERGENCY DEPT VISIT LOW MDM: CPT

## 2018-04-30 PROCEDURE — 72220 X-RAY EXAM SACRUM TAILBONE: CPT

## 2018-04-30 RX ORDER — LISINOPRIL AND HYDROCHLOROTHIAZIDE 20; 12.5 MG/1; MG/1
TABLET ORAL
COMMUNITY
End: 2018-05-22 | Stop reason: SDUPTHER

## 2018-04-30 RX ORDER — BENZONATATE 100 MG/1
CAPSULE ORAL
COMMUNITY
End: 2018-05-22 | Stop reason: ALTCHOICE

## 2018-04-30 RX ORDER — IBUPROFEN 600 MG/1
600 TABLET ORAL ONCE
Status: COMPLETED | OUTPATIENT
Start: 2018-04-30 | End: 2018-04-30

## 2018-04-30 RX ORDER — ALBUTEROL SULFATE 90 UG/1
AEROSOL, METERED RESPIRATORY (INHALATION)
COMMUNITY
End: 2018-07-19

## 2018-04-30 RX ORDER — METHYLPREDNISOLONE 4 MG/1
TABLET ORAL
COMMUNITY
End: 2018-05-22 | Stop reason: ALTCHOICE

## 2018-04-30 RX ORDER — MELOXICAM 15 MG/1
TABLET ORAL
COMMUNITY
End: 2018-05-22 | Stop reason: ALTCHOICE

## 2018-04-30 RX ORDER — GUAIFENESIN AND CODEINE PHOSPHATE 100; 10 MG/5ML; MG/5ML
SOLUTION ORAL
COMMUNITY
End: 2018-05-22 | Stop reason: ALTCHOICE

## 2018-04-30 RX ORDER — ROSUVASTATIN CALCIUM 10 MG/1
TABLET, COATED ORAL
COMMUNITY
End: 2018-07-19

## 2018-04-30 RX ORDER — NAPROXEN 500 MG/1
500 TABLET ORAL 2 TIMES DAILY WITH MEALS
Qty: 10 TABLET | Refills: 0 | Status: SHIPPED | OUTPATIENT
Start: 2018-04-30 | End: 2018-05-22 | Stop reason: ALTCHOICE

## 2018-04-30 RX ORDER — ACETAMINOPHEN 325 MG/1
650 TABLET ORAL ONCE
Status: COMPLETED | OUTPATIENT
Start: 2018-04-30 | End: 2018-04-30

## 2018-04-30 RX ORDER — MULTIVIT-MIN/IRON/FOLIC ACID/K 18-600-40
CAPSULE ORAL
COMMUNITY
End: 2018-07-19

## 2018-04-30 RX ORDER — FLUTICASONE PROPIONATE 50 MCG
SPRAY, SUSPENSION (ML) NASAL
COMMUNITY
End: 2018-05-22 | Stop reason: ALTCHOICE

## 2018-04-30 RX ORDER — AZITHROMYCIN 250 MG/1
TABLET, FILM COATED ORAL
COMMUNITY
End: 2018-05-22 | Stop reason: ALTCHOICE

## 2018-04-30 RX ORDER — DOCUSATE SODIUM 100 MG/1
100 CAPSULE, LIQUID FILLED ORAL EVERY 12 HOURS
Qty: 60 CAPSULE | Refills: 0 | Status: SHIPPED | OUTPATIENT
Start: 2018-04-30 | End: 2018-05-22 | Stop reason: ALTCHOICE

## 2018-04-30 RX ADMIN — ACETAMINOPHEN 650 MG: 325 TABLET, FILM COATED ORAL at 18:32

## 2018-04-30 RX ADMIN — IBUPROFEN 600 MG: 600 TABLET ORAL at 18:32

## 2018-04-30 NOTE — TELEPHONE ENCOUNTER
PC from PT re: a fall she had this AM onto her tailbone hitting concrete  Discussed need to be seen likely in the ER to which she is agreeable  She will go to [facility]

## 2018-04-30 NOTE — DISCHARGE INSTRUCTIONS
Coccyx Injury   WHAT YOU NEED TO KNOW:   A coccyx (tailbone) injury is when your coccyx breaks, dislocates, or is not stable  The coccyx is a small bone shaped like a triangle that forms the bottom of your spine  DISCHARGE INSTRUCTIONS:   Medicines: You may need any of the following:  · NSAIDs  decrease swelling and pain or fever  This medicine can be bought with or without a doctor's order  This medicine can cause stomach bleeding or kidney problems in certain people  If you take blood thinner medicine, always ask your healthcare provider if NSAIDs are safe for you  Always read the medicine label and follow the directions on it before using this medicine  · Prescription pain medicine  may be given to decrease pain  Do not wait until the pain is severe before you take this medicine  · A bowel movement softener  makes it easier and less painful for you to have a bowel movement  · Take your medicine as directed  Contact your healthcare provider if you think your medicine is not helping or if you have side effects  Tell him if you are allergic to any medicine  Keep a list of the medicines, vitamins, and herbs you take  Include the amounts, and when and why you take them  Bring the list or the pill bottles to follow-up visits  Carry your medicine list with you in case of an emergency  Self-care:   · Use a donut-shaped cushion  to decrease pain and support your coccyx when you sit  · Ice  helps decrease swelling and pain  Ice may also help prevent tissue damage  Use an ice pack, or put crushed ice in a plastic bag  Cover it with a towel and place it on your coccyx for 15 to 20 minutes every hour or as directed  · Sleep  on a firm mattress  Place a pillow under your knees if you sleep on your back  Or, sleep on your side with a pillow between your knees  This will decrease pain and tension in your coccyx and back    Follow up with your healthcare provider as directed:  Write down your questions so you remember to ask them during your visits  Contact your healthcare provider if:   · You have trouble urinating or having a bowel movement  · Your pain or swelling get worse or do not go away with treatment  · You have a fever  · You have questions or concerns about your condition or care  Return to the emergency department if:   · You have trouble breathing  · You cannot move your legs  · Your legs suddenly go numb  · You have severe pain  © 2017 [facility] Information is for End User's use only and may not be sold, redistributed or otherwise used for commercial purposes  All illustrations and images included in CareNotes® are the copyrighted property of [employer]  or [address]  The above information is an  only  It is not intended as medical advice for individual conditions or treatments  Talk to your doctor, nurse or pharmacist before following any medical regimen to see if it is safe and effective for you

## 2018-04-30 NOTE — ED NOTES
Patient ambulatory to the bathroom with steady gait at this time      Mechelle Larson RN  04/30/18 [id number]

## 2018-04-30 NOTE — ED PROVIDER NOTES
History  Chief Complaint   Patient presents with    Tailbone Pain     Pt reports cleaning her pool today when she fell in the water  Pt states she hit her bottom on the cement in the pool and is now havingpainin her tailbone  Pt has fell sensation and movement  Pt is noton blood thinners        History provided by:  Patient   used: No    Fall   Mechanism of injury: fall    Injury location:  Pelvis  Pelvic injury location: Tailbone  Incident location:  Home  Time since incident: This morning  Arrived directly from scene: no    Fall:     Fall occurred:  Standing    Impact surface:  [place]    Point of impact:  Buttocks    Entrapped after fall: no    Suspicion of alcohol use: no    Suspicion of drug use: no    Prior to arrival data:     Patient ambulatory at scene: yes      Loss of consciousness: no      Amnesic to event: no    Associated symptoms: back pain (Tailbone pain)    Associated symptoms: no abdominal pain, no nausea and no vomiting    Risk factors: no anticoagulation therapy        Prior to Admission Medications   Prescriptions Last Dose Informant Patient Reported? Taking? Cholecalciferol (VITAMIN D) 2000 units CAPS   Yes Yes   Sig: Vitamin D2 50,000 unit capsule   Insulin Pen Needle (PEN NEEDLES 31GX5/16") 31G X 8 MM MISC   No Yes   Sig: by Does not apply route daily   acetaminophen (TYLENOL) 325 mg tablet   Yes Yes   Sig: Take by mouth   albuterol (PROAIR HFA) 90 mcg/act inhaler   Yes Yes   Sig: ProAir HFA 90 mcg/actuation aerosol inhaler   azithromycin (ZITHROMAX) 250 mg tablet   Yes Yes   Sig: azithromycin 250 mg tablet   benzonatate (TESSALON PERLES) 100 mg capsule   Yes Yes   Sig: TAKE 1 CAPSULE EVERY 6 HOURS AS NEEDED     fluocinonide (LIDEX) 0 05 % cream   Yes Yes   Sig: fluocinonide 0 05 % topical cream   fluticasone (FLONASE) 50 mcg/act nasal spray   Yes Yes   Sig: fluticasone 50 mcg/actuation nasal spray,suspension   gabapentin (NEURONTIN) 300 mg capsule   No Yes   Sig: Take 1 capsule (300 mg total) by mouth 2 (two) times a day   guaifenesin-codeine (CHERATUSSIN AC) 100-10 MG/5ML liquid   Yes Yes   Sig: Cheratussin AC 10 mg-100 mg/5 mL oral liquid   insulin glargine (BASAGLAR KWIKPEN) injection pen 100 units/mL   No Yes   Sig: Inject 0 3 mL (30 Units total) under the skin daily   insulin glargine (LANTUS SOLOSTAR) injection pen 100 units/mL   Yes No   Sig: Lantus Solostar U-100 Insulin 100 unit/mL (3 mL) subcutaneous pen   lisinopril-hydrochlorothiazide (PRINZIDE,ZESTORETIC) 20-12 5 MG per tablet   No Yes   Sig: Take 1 tablet by mouth daily   lisinopril-hydrochlorothiazide (PRINZIDE,ZESTORETIC) 20-12 5 MG per tablet   Yes No   Sig: lisinopril 20 mg-hydrochlorothiazide 12 5 mg tablet   meloxicam (MOBIC) 15 mg tablet   Yes Yes   Sig: meloxicam 15 mg tablet   metFORMIN (GLUCOPHAGE) 1000 MG tablet   Yes Yes   Sig: metformin 1,000 mg tablet   metFORMIN (GLUCOPHAGE) 500 mg tablet   Yes No   Sig: Take 2 tablets by mouth 2 (two) times a day   metFORMIN (GLUCOPHAGE) 500 mg tablet   Yes No   Sig: metformin 500 mg tablet   methylprednisolone (MEDROL) 4 mg tablet   Yes Yes   Sig: AS DIRECTED   rosuvastatin (CRESTOR) 10 MG tablet   Yes Yes   Sig: rosuvastatin 10 mg tablet   sitaGLIPtin (JANUVIA) 100 mg tablet   Yes Yes   Sig: Take 1 tablet by mouth daily   sitaGLIPtin (JANUVIA) 100 mg tablet   Yes No   Sig: Januvia 100 mg tablet      Facility-Administered Medications: None       Past Medical History:   Diagnosis Date    Diabetes mellitus ([facility] [address] )     Hyperlipidemia     Hypertension        Past Surgical History:   Procedure Laterality Date    CARPAL TUNNEL RELEASE      DILATION AND CURETTAGE OF UTERUS      HYSTERECTOMY      Total Abdominal       Family History   Problem Relation Age of Onset    Diabetes Father     Heart attack Father     Uterine cancer Maternal Grandmother      I have reviewed and agree with the history as documented      Social History   Substance Use Topics    Smoking status: Never Smoker    Smokeless tobacco: Never Used    Alcohol use No        Review of Systems   Constitutional: Negative for chills and fever  HENT: Negative for rhinorrhea and sore throat  Respiratory: Negative for cough  Gastrointestinal: Negative for abdominal distention, abdominal pain, anal bleeding, blood in stool, constipation, diarrhea, nausea, rectal pain and vomiting  Genitourinary: Negative for dysuria, flank pain, frequency, hematuria and urgency  Musculoskeletal: Positive for back pain (Tailbone pain)  Skin: Negative for pallor and rash  All other systems reviewed and are negative  Physical Exam  ED Triage Vitals [04/30/18 1716]   Temperature Pulse Respirations Blood Pressure SpO2   98 °F (36 7 °C) 86 18 137/70 98 %      Temp Source Heart Rate Source Patient Position - Orthostatic VS BP Location FiO2 (%)   Oral Monitor Sitting Right arm --      Pain Score       Worst Possible Pain           Orthostatic Vital Signs  Vitals:    04/30/18 1716   BP: 137/70   Pulse: 86   Patient Position - Orthostatic VS: Sitting       Physical Exam   Constitutional: She is oriented to person, place, and time  She appears well-developed and well-nourished  No distress  HENT:   Head: Normocephalic  Mouth/Throat: Oropharynx is clear and moist and mucous membranes are normal    Neck: Normal range of motion  Neck supple  Cardiovascular: Normal rate, regular rhythm, normal heart sounds and intact distal pulses  Exam reveals no gallop and no friction rub  No murmur heard  Pulmonary/Chest: Effort normal and breath sounds normal  No respiratory distress  She has no wheezes  She has no rales  Abdominal: Soft  Normal appearance and bowel sounds are normal  She exhibits no distension and no mass  There is no hepatosplenomegaly  There is no tenderness  There is no rigidity, no rebound, no guarding, no CVA tenderness, no tenderness at McBurney's point and negative Nolan's sign     Musculoskeletal: Lumbar back: She exhibits bony tenderness (Saccrum)  Lymphadenopathy:     She has no cervical adenopathy  Neurological: She is alert and oriented to person, place, and time  She has normal strength  Skin: Skin is warm, dry and intact  No rash noted  No pallor  Nursing note and vitals reviewed  ED Medications  Medications   ibuprofen (MOTRIN) tablet 600 mg (600 mg Oral Given 4/30/18 1832)   acetaminophen (TYLENOL) tablet 650 mg (650 mg Oral Given 4/30/18 1832)       Diagnostic Studies  Results Reviewed     None                 XR sacrum and coccyx   ED Interpretation by DO Krystal (04/30 1922)   Old fx noted  ?new fx  Will treat as such  Procedures  Procedures       Phone Contacts  ED Phone Contact    ED Course                               MDM  Number of Diagnoses or Management Options  Diagnosis management comments: Tailbone pain s/p fall - Will xray to r/o fx  Amount and/or Complexity of Data Reviewed  Tests in the radiology section of CPT®: ordered and reviewed      CritCare Time    Disposition  Final diagnoses:   Closed fracture of coccyx [facility])     Time reflects when diagnosis was documented in both MDM as applicable and the Disposition within this note     Time User Action Codes Description Comment    4/30/2018  7:06 PM Raymon Leroy 74  2XXA] Closed fracture of coccyx [facility])       ED Disposition     ED Disposition Condition Comment    Discharge  Alejo Hill discharge to home/self care      Condition at discharge: Good        Follow-up Information    None       Discharge Medication List as of 4/30/2018  7:07 PM      START taking these medications    Details   docusate sodium (COLACE) 100 mg capsule Take 1 capsule (100 mg total) by mouth every 12 (twelve) hours, Starting Mon 4/30/2018, Print      naproxen (NAPROSYN) 500 mg tablet Take 1 tablet (500 mg total) by mouth 2 (two) times a day with meals, Starting Mon 4/30/2018, Print         CONTINUE these medications which have NOT CHANGED    Details   acetaminophen (TYLENOL) 325 mg tablet Take by mouth, Historical Med      albuterol (PROAIR HFA) 90 mcg/act inhaler ProAir HFA 90 mcg/actuation aerosol inhaler, Historical Med      azithromycin (ZITHROMAX) 250 mg tablet azithromycin 250 mg tablet, Historical Med      benzonatate (TESSALON PERLES) 100 mg capsule TAKE 1 CAPSULE EVERY 6 HOURS AS NEEDED , Historical Med      Cholecalciferol (VITAMIN D) 2000 units CAPS Vitamin D2 50,000 unit capsule, Historical Med      fluocinonide (LIDEX) 0 05 % cream fluocinonide 0 05 % topical cream, Historical Med      fluticasone (FLONASE) 50 mcg/act nasal spray fluticasone 50 mcg/actuation nasal spray,suspension, Historical Med      gabapentin (NEURONTIN) 300 mg capsule Take 1 capsule (300 mg total) by mouth 2 (two) times a day, Starting Thu 4/19/2018, Normal      guaifenesin-codeine (CHERATUSSIN AC) 100-10 MG/5ML liquid Cheratussin AC 10 mg-100 mg/5 mL oral liquid, Historical Med      !! insulin glargine (BASAGLAR KWIKPEN) injection pen 100 units/mL Inject 0 3 mL (30 Units total) under the skin daily, Starting Thu 4/5/2018, Normal      Insulin Pen Needle (PEN NEEDLES 31GX5/16") 31G X 8 MM MISC by Does not apply route daily, Starting Mon 2/12/2018, Normal      !! lisinopril-hydrochlorothiazide (PRINZIDE,ZESTORETIC) 20-12 5 MG per tablet Take 1 tablet by mouth daily, Starting Tue 3/6/2018, Normal      meloxicam (MOBIC) 15 mg tablet meloxicam 15 mg tablet, Historical Med      !! metFORMIN (GLUCOPHAGE) 1000 MG tablet metformin 1,000 mg tablet, Historical Med      methylprednisolone (MEDROL) 4 mg tablet AS DIRECTED, Historical Med      rosuvastatin (CRESTOR) 10 MG tablet rosuvastatin 10 mg tablet, Historical Med      !! sitaGLIPtin (JANUVIA) 100 mg tablet Take 1 tablet by mouth daily, Starting Wed 12/7/2011, Historical Med      !! insulin glargine (LANTUS SOLOSTAR) injection pen 100 units/mL Lantus Solostar U-100 Insulin 100 unit/mL (3 mL) subcutaneous pen, Historical Med      !! lisinopril-hydrochlorothiazide (PRINZIDE,ZESTORETIC) 20-12 5 MG per tablet lisinopril 20 mg-hydrochlorothiazide 12 5 mg tablet, Historical Med      !! metFORMIN (GLUCOPHAGE) 500 mg tablet Take 2 tablets by mouth 2 (two) times a day, Historical Med      !! metFORMIN (GLUCOPHAGE) 500 mg tablet metformin 500 mg tablet, Historical Med      !! sitaGLIPtin (JANUVIA) 100 mg tablet Januvia 100 mg tablet, Historical Med       !! - Potential duplicate medications found  Please discuss with provider  No discharge procedures on file      ED Provider  Electronically Signed by           Magen Marino 25, DO  04/30/18 3565

## 2018-05-08 ENCOUNTER — TELEPHONE (OUTPATIENT)
Dept: INTERNAL MEDICINE CLINIC | Facility: CLINIC | Age: 68
End: 2018-05-08

## 2018-05-08 DIAGNOSIS — M79.604 PAIN OF RIGHT LOWER EXTREMITY: Primary | ICD-10-CM

## 2018-05-08 NOTE — TELEPHONE ENCOUNTER
Juni Atkinson called because she has not had any improvement with the Gabapentin  Her leg pain doesn't let her sleep  She would like to go for the MRI of the Lumbar Spine as discussed at the last office visit  Dr Dre Magallanes agreed to order it

## 2018-05-16 ENCOUNTER — APPOINTMENT (OUTPATIENT)
Dept: LAB | Facility: HOSPITAL | Age: 68
End: 2018-05-16
Attending: INTERNAL MEDICINE
Payer: MEDICARE

## 2018-05-16 ENCOUNTER — HOSPITAL ENCOUNTER (OUTPATIENT)
Dept: MRI IMAGING | Facility: HOSPITAL | Age: 68
Discharge: HOME | End: 2018-05-16
Attending: INTERNAL MEDICINE
Payer: MEDICARE

## 2018-05-16 DIAGNOSIS — E11.9 TYPE 2 DIABETES MELLITUS WITHOUT COMPLICATION, WITH LONG-TERM CURRENT USE OF INSULIN (HCC): ICD-10-CM

## 2018-05-16 DIAGNOSIS — M79.604 PAIN OF RIGHT LOWER EXTREMITY: ICD-10-CM

## 2018-05-16 DIAGNOSIS — E11.65 TYPE 2 DIABETES MELLITUS WITH HYPERGLYCEMIA, WITH LONG-TERM CURRENT USE OF INSULIN (HCC): ICD-10-CM

## 2018-05-16 DIAGNOSIS — I10 ESSENTIAL HYPERTENSION: ICD-10-CM

## 2018-05-16 DIAGNOSIS — Z79.4 TYPE 2 DIABETES MELLITUS WITH HYPERGLYCEMIA, WITH LONG-TERM CURRENT USE OF INSULIN (HCC): ICD-10-CM

## 2018-05-16 DIAGNOSIS — Z79.4 TYPE 2 DIABETES MELLITUS WITHOUT COMPLICATION, WITH LONG-TERM CURRENT USE OF INSULIN (HCC): ICD-10-CM

## 2018-05-16 LAB
25(OH)D3 SERPL-MCNC: 13.8 NG/ML (ref 30–100)
ALBUMIN SERPL BCP-MCNC: 4 G/DL (ref 3.5–5)
ALP SERPL-CCNC: 53 U/L (ref 46–116)
ALT SERPL W P-5'-P-CCNC: 23 U/L (ref 12–78)
ANION GAP SERPL CALCULATED.3IONS-SCNC: 8 MMOL/L (ref 4–13)
AST SERPL W P-5'-P-CCNC: 23 U/L (ref 5–45)
BASOPHILS # BLD AUTO: 0.02 THOUSANDS/ΜL (ref 0–0.1)
BASOPHILS NFR BLD AUTO: 0 % (ref 0–1)
BILIRUB SERPL-MCNC: 0.36 MG/DL (ref 0.2–1)
BUN SERPL-MCNC: 16 MG/DL (ref 5–25)
CALCIUM SERPL-MCNC: 9.6 MG/DL (ref 8.3–10.1)
CHLORIDE SERPL-SCNC: 100 MMOL/L (ref 100–108)
CHOLEST SERPL-MCNC: 220 MG/DL (ref 50–200)
CO2 SERPL-SCNC: 27 MMOL/L (ref 21–32)
CREAT SERPL-MCNC: 0.72 MG/DL (ref 0.6–1.3)
EOSINOPHIL # BLD AUTO: 0.2 THOUSAND/ΜL (ref 0–0.61)
EOSINOPHIL NFR BLD AUTO: 2 % (ref 0–6)
ERYTHROCYTE [DISTWIDTH] IN BLOOD BY AUTOMATED COUNT: 13.3 % (ref 11.6–15.1)
EST. AVERAGE GLUCOSE BLD GHB EST-MCNC: 180 MG/DL
GFR SERPL CREATININE-BSD FRML MDRD: 87 ML/MIN/1.73SQ M
GLUCOSE P FAST SERPL-MCNC: 113 MG/DL (ref 65–99)
HBA1C MFR BLD: 7.9 % (ref 4.2–6.3)
HCT VFR BLD AUTO: 37.3 % (ref 34.8–46.1)
HDLC SERPL-MCNC: 41 MG/DL (ref 40–60)
HGB BLD-MCNC: 12.5 G/DL (ref 11.5–15.4)
IMM GRANULOCYTES # BLD AUTO: 0.04 THOUSAND/UL (ref 0–0.2)
IMM GRANULOCYTES NFR BLD AUTO: 1 % (ref 0–2)
LDLC SERPL CALC-MCNC: 142 MG/DL (ref 0–100)
LYMPHOCYTES # BLD AUTO: 3.02 THOUSANDS/ΜL (ref 0.6–4.47)
LYMPHOCYTES NFR BLD AUTO: 35 % (ref 14–44)
MCH RBC QN AUTO: 29.4 PG (ref 26.8–34.3)
MCHC RBC AUTO-ENTMCNC: 33.5 G/DL (ref 31.4–37.4)
MCV RBC AUTO: 88 FL (ref 82–98)
MONOCYTES # BLD AUTO: 0.55 THOUSAND/ΜL (ref 0.17–1.22)
MONOCYTES NFR BLD AUTO: 6 % (ref 4–12)
NEUTROPHILS # BLD AUTO: 4.97 THOUSANDS/ΜL (ref 1.85–7.62)
NEUTS SEG NFR BLD AUTO: 57 % (ref 43–75)
NONHDLC SERPL-MCNC: 179 MG/DL
NRBC BLD AUTO-RTO: 0 /100 WBCS
PLATELET # BLD AUTO: 262 THOUSANDS/UL (ref 149–390)
PMV BLD AUTO: 11.6 FL (ref 8.9–12.7)
POTASSIUM SERPL-SCNC: 3.9 MMOL/L (ref 3.5–5.3)
PROT SERPL-MCNC: 7.5 G/DL (ref 6.4–8.2)
RBC # BLD AUTO: 4.25 MILLION/UL (ref 3.81–5.12)
SODIUM SERPL-SCNC: 135 MMOL/L (ref 136–145)
TRIGL SERPL-MCNC: 186 MG/DL
WBC # BLD AUTO: 8.76 THOUSAND/UL (ref 4.31–10.16)

## 2018-05-16 PROCEDURE — 80061 LIPID PANEL: CPT | Performed by: INTERNAL MEDICINE

## 2018-05-16 PROCEDURE — 83036 HEMOGLOBIN GLYCOSYLATED A1C: CPT

## 2018-05-16 PROCEDURE — 80053 COMPREHEN METABOLIC PANEL: CPT | Performed by: INTERNAL MEDICINE

## 2018-05-16 PROCEDURE — 85025 COMPLETE CBC W/AUTO DIFF WBC: CPT

## 2018-05-16 PROCEDURE — 36415 COLL VENOUS BLD VENIPUNCTURE: CPT | Performed by: INTERNAL MEDICINE

## 2018-05-16 PROCEDURE — 72148 MRI LUMBAR SPINE W/O DYE: CPT

## 2018-05-16 PROCEDURE — 82306 VITAMIN D 25 HYDROXY: CPT | Performed by: INTERNAL MEDICINE

## 2018-05-17 RX ORDER — INSULIN GLARGINE 100 [IU]/ML
30 INJECTION, SOLUTION SUBCUTANEOUS DAILY
Qty: 12 ML | Refills: 0 | Status: SHIPPED | OUTPATIENT
Start: 2018-05-17 | End: 2018-06-25 | Stop reason: SDUPTHER

## 2018-05-21 ENCOUNTER — OFFICE VISIT (OUTPATIENT)
Dept: URGENT CARE | Age: 68
End: 2018-05-21
Payer: MEDICARE

## 2018-05-21 ENCOUNTER — APPOINTMENT (OUTPATIENT)
Dept: RADIOLOGY | Age: 68
End: 2018-05-21
Payer: MEDICARE

## 2018-05-21 VITALS
SYSTOLIC BLOOD PRESSURE: 142 MMHG | TEMPERATURE: 99 F | RESPIRATION RATE: 18 BRPM | OXYGEN SATURATION: 97 % | HEIGHT: 61 IN | WEIGHT: 157 LBS | DIASTOLIC BLOOD PRESSURE: 69 MMHG | BODY MASS INDEX: 29.64 KG/M2 | HEART RATE: 88 BPM

## 2018-05-21 DIAGNOSIS — S62.002A CLOSED NONDISPLACED FRACTURE OF SCAPHOID OF LEFT WRIST, UNSPECIFIED PORTION OF SCAPHOID, INITIAL ENCOUNTER: Primary | ICD-10-CM

## 2018-05-21 DIAGNOSIS — M25.532 LEFT WRIST PAIN: ICD-10-CM

## 2018-05-21 DIAGNOSIS — E11.9 TYPE 2 DIABETES MELLITUS WITHOUT COMPLICATION, WITHOUT LONG-TERM CURRENT USE OF INSULIN (HCC): Primary | ICD-10-CM

## 2018-05-21 PROCEDURE — 99213 OFFICE O/P EST LOW 20 MIN: CPT | Performed by: PHYSICIAN ASSISTANT

## 2018-05-21 PROCEDURE — G0463 HOSPITAL OUTPT CLINIC VISIT: HCPCS | Performed by: PHYSICIAN ASSISTANT

## 2018-05-21 PROCEDURE — 73110 X-RAY EXAM OF WRIST: CPT

## 2018-05-21 PROCEDURE — 29125 APPL SHORT ARM SPLINT STATIC: CPT | Performed by: PHYSICIAN ASSISTANT

## 2018-05-21 RX ORDER — LANCETS 28 GAUGE
EACH MISCELLANEOUS
COMMUNITY
End: 2018-07-19

## 2018-05-21 RX ORDER — ACETAMINOPHEN AND CODEINE PHOSPHATE 300; 30 MG/1; MG/1
1 TABLET ORAL EVERY 4 HOURS PRN
Qty: 30 TABLET | Refills: 0 | Status: SHIPPED | OUTPATIENT
Start: 2018-05-21 | End: 2018-07-19

## 2018-05-21 NOTE — PATIENT INSTRUCTIONS
Follow up with orthopedics  Proceed to  ER if symptoms worsen  Scaphoid Fracture   WHAT YOU NEED TO KNOW:   A scaphoid fracture is a break in one of the small bones of your wrist  It is usually caused by a fall on an outstretched hand  It may also be caused by trauma, such as a car accident  DISCHARGE INSTRUCTIONS:   Medicines:   · NSAIDs , such as ibuprofen, help decrease swelling, pain, and fever  This medicine is available with or without a doctor's order  NSAIDs can cause stomach bleeding or kidney problems in certain people  If you take blood thinner medicine, always ask if NSAIDs are safe for you  Always read the medicine label and follow directions  Do not give these medicines to children under 10months of age without direction from your child's healthcare provider  · Prescription pain medicine  may be given  Ask your healthcare provider how to take this medicine safely  · Take your medicine as directed  Contact your healthcare provider if you think your medicine is not helping or if you have side effects  Tell him or her if you are allergic to any medicine  Keep a list of the medicines, vitamins, and herbs you take  Include the amounts, and when and why you take them  Bring the list or the pill bottles to follow-up visits  Carry your medicine list with you in case of an emergency  Follow up with your healthcare provider or orthopedic specialist as directed: You will need to return for more x-rays to check how your wrist is healing  Write down your questions so you remember to ask them during your visits  Manage your symptoms and promote healing:   · Apply ice  on your wrist for 15 to 20 minutes every hour, or as directed  Use an ice pack, or put crushed ice in a plastic bag  Cover it with a towel  Ice helps prevent tissue damage, and decreases pain and swelling  · Elevate your wrist  above the level of your heart as often as you can  This will help decrease pain and swelling   Prop your wrist on pillows or blankets to keep it elevated comfortably  Do not smoke: If you smoke, it is never too late to quit  Smoking can slow healing  Ask your healthcare provider for information if you need help quitting  Physical or occupational therapy:  You may need physical or occupational therapy after your bone heals  A therapist can teach you exercises to help improve movement and strength, and to decrease pain  Contact your healthcare provider or orthopedic specialist if:   · You have questions or concerns about your care  Return to the emergency department if:   · Your fingers or thumb tingle, or feel numb  · Your fingers or thumb become cold, or turn blue or white  · You have severe pain in your hand or wrist, or your pain worsens  © 2017 [address] [facility] Information is for End User's use only and may not be sold, redistributed or otherwise used for commercial purposes  All illustrations and images included in CareNotes® are the copyrighted property of A D A M , Inc  or Rao Spencer  The above information is an  only  It is not intended as medical advice for individual conditions or treatments  Talk to your doctor, nurse or pharmacist before following any medical regimen to see if it is safe and effective for you

## 2018-05-21 NOTE — PROGRESS NOTES
[facility]        NAME: Anabell Tobar is a 80 y o  female  : 1950    MRN: [id number]  DATE: May 21, 2018  TIME: 8:00 PM    Assessment and Plan   Closed nondisplaced fracture of scaphoid of left wrist, unspecified portion of scaphoid, initial encounter [S62 [id number]]  1  Closed nondisplaced fracture of scaphoid of left wrist, unspecified portion of scaphoid, initial encounter  Splint    acetaminophen-codeine (TYLENOL #3) 300-30 mg per tablet    Ambulatory referral to Orthopedic Surgery   2  Left wrist pain  XR wrist 3+ vw left         Patient Instructions       Follow up with orthopedics  Proceed to  ER if symptoms worsen  Chief Complaint     Chief Complaint   Patient presents with    Wrist Pain     Pt states she fell and injured her left wrist at 8:40AM today  History of Present Illness       Tripped and fell on to L wrist today  Still painfull and swollen  Wrist Pain    The pain is present in the left wrist  This is a new problem  The current episode started today  There has been a history of trauma  The problem has been unchanged  The quality of the pain is described as aching and pounding  The pain is moderate  Associated symptoms include joint swelling and stiffness  Pertinent negatives include no fever, inability to bear weight, itching, joint locking, limited range of motion, numbness or tingling  The symptoms are aggravated by activity  She has tried cold for the symptoms  The treatment provided no relief  Family history does not include gout  There is no history of diabetes  Review of Systems   Review of Systems   Constitutional: Negative  Negative for fever  HENT: Negative  Eyes: Negative  Respiratory: Negative  Cardiovascular: Negative  Musculoskeletal: Positive for joint swelling and stiffness  Skin: Negative  Negative for itching  Neurological: Negative for tingling and numbness           Current Medications       Current Outpatient Prescriptions:     acetaminophen (TYLENOL) 325 mg tablet, Take by mouth, Disp: , Rfl:     acetaminophen-codeine (TYLENOL #3) 300-30 mg per tablet, Take 1 tablet by mouth every 4 (four) hours as needed for moderate pain, Disp: 30 tablet, Rfl: 0    albuterol (PROAIR HFA) 90 mcg/act inhaler, ProAir HFA 90 mcg/actuation aerosol inhaler, Disp: , Rfl:     azithromycin (ZITHROMAX) 250 mg tablet, azithromycin 250 mg tablet, Disp: , Rfl:     BASAGLAR KWIKPEN injection pen 100 units/mL, Inject 0 3 mL (30 Units total) under the skin daily, Disp: 12 mL, Rfl: 0    benzonatate (TESSALON PERLES) 100 mg capsule, TAKE 1 CAPSULE EVERY 6 HOURS AS NEEDED , Disp: , Rfl:     Cholecalciferol (VITAMIN D) 2000 units CAPS, Vitamin D2 50,000 unit capsule, Disp: , Rfl:     docusate sodium (COLACE) 100 mg capsule, Take 1 capsule (100 mg total) by mouth every 12 (twelve) hours, Disp: 60 capsule, Rfl: 0    fluocinonide (LIDEX) 0 05 % cream, fluocinonide 0 05 % topical cream, Disp: , Rfl:     fluticasone (FLONASE) 50 mcg/act nasal spray, fluticasone 50 mcg/actuation nasal spray,suspension, Disp: , Rfl:     gabapentin (NEURONTIN) 300 mg capsule, Take 1 capsule (300 mg total) by mouth 2 (two) times a day, Disp: 60 capsule, Rfl: 0    glucose blood test strip, TEST BLOOD SUGAR TWICE DAILY, Disp: , Rfl:     guaifenesin-codeine (CHERATUSSIN AC) 100-10 MG/5ML liquid, Cheratussin AC 10 mg-100 mg/5 mL oral liquid, Disp: , Rfl:     Insulin Pen Needle (PEN NEEDLES 31GX5/16") 31G X 8 MM MISC, by Does not apply route daily, Disp: 100 each, Rfl: 2    Insulin Pen Needle 31G X 8 MM MISC, BD Ultra-Fine Short Pen Needle 31 gauge x 5/16", Disp: , Rfl:     Lancets (FREESTYLE) lancets, TEST BLOOD SUGAR 2 TIMES A DAY , Disp: , Rfl:     lisinopril-hydrochlorothiazide (PRINZIDE,ZESTORETIC) 20-12 5 MG per tablet, Take 1 tablet by mouth daily, Disp: 30 tablet, Rfl: 5    lisinopril-hydrochlorothiazide (PRINZIDE,ZESTORETIC) 20-12 5 MG per tablet, lisinopril 20 mg-hydrochlorothiazide 12 5 mg tablet, Disp: , Rfl:     meloxicam (MOBIC) 15 mg tablet, meloxicam 15 mg tablet, Disp: , Rfl:     metFORMIN (GLUCOPHAGE) 1000 MG tablet, metformin 1,000 mg tablet, Disp: , Rfl:     metFORMIN (GLUCOPHAGE) 500 mg tablet, Take 2 tablets by mouth 2 (two) times a day, Disp: , Rfl:     metFORMIN (GLUCOPHAGE) 500 mg tablet, metformin 500 mg tablet, Disp: , Rfl:     methylprednisolone (MEDROL) 4 mg tablet, AS DIRECTED, Disp: , Rfl:     naproxen (NAPROSYN) 500 mg tablet, Take 1 tablet (500 mg total) by mouth 2 (two) times a day with meals, Disp: 10 tablet, Rfl: 0    rosuvastatin (CRESTOR) 10 MG tablet, rosuvastatin 10 mg tablet, Disp: , Rfl:     sitaGLIPtin (JANUVIA) 100 mg tablet, Take 1 tablet by mouth daily, Disp: , Rfl:     sitaGLIPtin (JANUVIA) 100 mg tablet, Januvia 100 mg tablet, Disp: , Rfl:     Current Allergies     Allergies as of 05/21/2018 - Reviewed 05/21/2018   Allergen Reaction Noted    Pioglitazone  06/25/2012    Other Rash             The following portions of the patient's history were reviewed and updated as appropriate: allergies, current medications, past family history, past medical history, past social history, past surgical history and problem list      Past Medical History:   Diagnosis Date    Diabetes mellitus ([address] )     Hyperlipidemia     Hypertension        Past Surgical History:   Procedure Laterality Date    CARPAL TUNNEL RELEASE      DILATION AND CURETTAGE OF UTERUS      HYSTERECTOMY      Total Abdominal       Family History   Problem Relation Age of Onset    Diabetes Father     Heart attack Father     Uterine cancer Maternal Grandmother          Medications have been verified  Objective   /69   Pulse 88   Temp 99 °F (37 2 °C)   Resp 18   Ht 5' 1" (1 549 m)   Wt 71 2 kg (157 lb)   SpO2 97%   BMI 29 66 kg/m²        Physical Exam     Physical Exam   Constitutional: She is oriented to person, place, and time   She appears well-developed and well-nourished  No distress  HENT:   Head: Normocephalic and atraumatic  Right Ear: External ear normal    Left Ear: External ear normal    Nose: Nose normal    Mouth/Throat: Oropharynx is clear and moist  No oropharyngeal exudate  Eyes: Conjunctivae are normal    Neck: Normal range of motion  Neck supple  Cardiovascular: Normal rate, regular rhythm, normal heart sounds and intact distal pulses  No murmur heard  Pulmonary/Chest: Effort normal and breath sounds normal  No respiratory distress  She has no rales  Abdominal: Soft  Bowel sounds are normal  There is no tenderness  Musculoskeletal:        Left wrist: She exhibits decreased range of motion, tenderness, bony tenderness and swelling  She exhibits no effusion, no crepitus, no deformity and no laceration  Arms:  Lymphadenopathy:     She has no cervical adenopathy  Neurological: She is alert and oriented to person, place, and time  Skin: Skin is warm and dry  Psychiatric: She has a normal mood and affect  Nursing note and vitals reviewed  xrays reviewed    No acute fx noted but has a lot of snuffbox tenderness with palpation

## 2018-05-22 ENCOUNTER — OFFICE VISIT (OUTPATIENT)
Dept: INTERNAL MEDICINE CLINIC | Facility: CLINIC | Age: 68
End: 2018-05-22
Payer: MEDICARE

## 2018-05-22 VITALS
DIASTOLIC BLOOD PRESSURE: 74 MMHG | RESPIRATION RATE: 15 BRPM | BODY MASS INDEX: 29.64 KG/M2 | HEIGHT: 61 IN | TEMPERATURE: 98.1 F | SYSTOLIC BLOOD PRESSURE: 126 MMHG | WEIGHT: 157 LBS | HEART RATE: 84 BPM

## 2018-05-22 DIAGNOSIS — M25.532 ARTHRALGIA OF LEFT WRIST: ICD-10-CM

## 2018-05-22 DIAGNOSIS — E78.00 HYPERCHOLESTEROLEMIA: ICD-10-CM

## 2018-05-22 DIAGNOSIS — E55.9 VITAMIN D DEFICIENCY: ICD-10-CM

## 2018-05-22 DIAGNOSIS — I10 ESSENTIAL HYPERTENSION: Primary | ICD-10-CM

## 2018-05-22 DIAGNOSIS — Z79.4 TYPE 2 DIABETES MELLITUS WITH HYPERGLYCEMIA, WITH LONG-TERM CURRENT USE OF INSULIN (HCC): ICD-10-CM

## 2018-05-22 DIAGNOSIS — M79.2 NEURALGIA: ICD-10-CM

## 2018-05-22 DIAGNOSIS — E11.65 TYPE 2 DIABETES MELLITUS WITH HYPERGLYCEMIA, WITH LONG-TERM CURRENT USE OF INSULIN (HCC): ICD-10-CM

## 2018-05-22 PROBLEM — M25.539 WRIST JOINT PAIN: Status: ACTIVE | Noted: 2018-05-22

## 2018-05-22 PROBLEM — M54.31 SCIATICA OF RIGHT SIDE: Status: ACTIVE | Noted: 2018-05-22

## 2018-05-22 PROCEDURE — 99214 OFFICE O/P EST MOD 30 MIN: CPT | Performed by: INTERNAL MEDICINE

## 2018-05-22 RX ORDER — GABAPENTIN 300 MG/1
300 CAPSULE ORAL 2 TIMES DAILY
Qty: 60 CAPSULE | Refills: 0 | Status: SHIPPED | OUTPATIENT
Start: 2018-05-22 | End: 2018-07-19

## 2018-05-22 NOTE — PROGRESS NOTES
[facility] [place]      NAME: Corey Tavarez  AGE: 80 y o  SEX: female  : 1950   MRN: [id number]    DATE: 2018  TIME: 5:32 PM    Assessment and Plan   1  Essential hypertension  Well controlled  - CBC and differential  - Comprehensive metabolic panel    2  Type 2 diabetes mellitus with hyperglycemia, with long-term current use of insulin ([address] )  The patient will work to improve her diet somewhat  She will continue her current medications   - HEMOGLOBIN A1C W/ EAG ESTIMATION; Future  - Microalbumin / creatinine urine ratio    3  Hypercholesterolemia  Well controlled  - Lipid panel    4  Vitamin D deficiency  On replacement  - Vitamin D 25 hydroxy    5  Arthralgia of left wrist  The patient will be seeing Dr Jorge Mckeon in the near future  6  Neuralgia  This is related to an L5 radiculopathy  Symptoms are improved on gabapentin   - gabapentin (NEURONTIN) 300 mg capsule; Take 1 capsule (300 mg total) by mouth 2 (two) times a day  Dispense: 60 capsule; Refill: 0    Overall, the patient is doing well  Her leg symptoms have improved and she will start going to the gym again  She has been eating a lot of fruit will cut down on this to some degree  We will recheck lab work before her next visit  Return to office in:  3 months  Chief Complaint     Chief Complaint   Patient presents with    Follow-up     3 months, tripped outside at home yesterday, to urgent care       History of Present Illness     The patient returned to the office for re-evaluation of hypertension, type 2 diabetes, hypercholesterolemia, paresthesias in her right leg, and esophageal reflux  Since her last visit she has been doing generally well  Her leg symptoms have improved  She is tolerating gabapentin  She still has occasional mild numbness in her leg  She has no pain or weakness  Yesterday, she fell  She tripped over something on her patio    She had her left knee, left wrist, and lacerated her upper lip  She was seen at urgent care  X-rays of her wrist were negative  Orthopedic evaluation was recommended and has been scheduled for tomorrow  The patient had no dizziness, palpitations, or focal neurologic symptom  She has been tolerating her medications well  She has had no symptoms to suggest hyperglycemia or hypoglycemia  The following portions of the patient's history were reviewed and updated as appropriate: allergies, current medications, past family history, past medical history, past social history, past surgical history and problem list     Review of Systems   Review of Systems   Constitutional: Negative  HENT: Negative for congestion, ear pain, postnasal drip, rhinorrhea, sore throat and trouble swallowing  Eyes: Negative for pain, discharge, redness and visual disturbance  Respiratory: Negative for cough, shortness of breath and wheezing  Cardiovascular: Negative  Gastrointestinal: Negative  Endocrine: Negative  Genitourinary: Negative for difficulty urinating, dysuria, frequency, hematuria and urgency  Musculoskeletal: Negative for arthralgias, gait problem, joint swelling and myalgias  Skin: Negative for rash  Neurological: Negative for dizziness, speech difficulty, weakness, light-headedness, numbness and headaches  Hematological: Negative  Psychiatric/Behavioral: Negative for confusion, decreased concentration, dysphoric mood and sleep disturbance  The patient is not nervous/anxious          Active Problem List     Patient Active Problem List   Diagnosis    Allergic rhinitis    Hypertension    Gastroesophageal reflux disease with esophagitis    Hypercholesterolemia    Type 2 diabetes mellitus with hyperglycemia (HCC)    Vitamin D deficiency    Wrist joint pain    Sciatica of right side       Objective   /74 (BP Location: Left arm, Patient Position: Sitting, Cuff Size: Standard)   Pulse 84   Temp 98 1 °F (36 7 °C) (Tympanic)   Resp 15 Ht 5' 1" (1 549 m)   Wt 71 2 kg (157 lb)   BMI 29 66 kg/m²     Physical Exam   Constitutional: She is oriented to person, place, and time  She appears well-developed and well-nourished  HENT:   Head: Normocephalic and atraumatic  Neck: Neck supple  No JVD present  No tracheal deviation present  No thyromegaly present  Cardiovascular: Normal rate, regular rhythm, normal heart sounds and intact distal pulses  Exam reveals no gallop and no friction rub  No murmur heard  Pulmonary/Chest: Effort normal and breath sounds normal  She has no wheezes  She has no rales  She exhibits no tenderness  Abdominal: Soft  Bowel sounds are normal  She exhibits no distension and no mass  There is no tenderness  There is no rebound  Musculoskeletal: Normal range of motion  She exhibits tenderness  She exhibits no edema  The left knee has some shallow abrasions  Range of motion is full  There is no effusion or evidence of ligamentous disruption  She has an immobilizer on her left wrist    Lymphadenopathy:     She has no cervical adenopathy  Neurological: She is alert and oriented to person, place, and time  Skin: Skin is warm and dry  Psychiatric: She has a normal mood and affect         Pertinent Laboratory/Diagnostic Studies:  Appointment on 05/16/2018   Component Date Value Ref Range Status    WBC 05/16/2018 8 76  4 31 - 10 16 Thousand/uL Final    RBC 05/16/2018 4 25  3 81 - 5 12 Million/uL Final    Hemoglobin 05/16/2018 12 5  11 5 - 15 4 g/dL Final    Hematocrit 05/16/2018 37 3  34 8 - 46 1 % Final    MCV 05/16/2018 88  82 - 98 fL Final    MCH 05/16/2018 29 4  26 8 - 34 3 pg Final    MCHC 05/16/2018 33 5  31 4 - 37 4 g/dL Final    RDW 05/16/2018 13 3  11 6 - 15 1 % Final    MPV 05/16/2018 11 6  8 9 - 12 7 fL Final    Platelets 37/70/6018 262  149 - 390 Thousands/uL Final    nRBC 05/16/2018 0  /100 WBCs Final    Neutrophils Relative 05/16/2018 57  43 - 75 % Final    Immat GRANS % 05/16/2018 1  0 - 2 % Final    Lymphocytes Relative 05/16/2018 35  14 - 44 % Final    Monocytes Relative 05/16/2018 6  4 - 12 % Final    Eosinophils Relative 05/16/2018 2  0 - 6 % Final    Basophils Relative 05/16/2018 0  0 - 1 % Final    Neutrophils Absolute 05/16/2018 4 97  1 85 - 7 62 Thousands/µL Final    Immature Grans Absolute 05/16/2018 0 04  0 00 - 0 20 Thousand/uL Final    Lymphocytes Absolute 05/16/2018 3 02  0 60 - 4 47 Thousands/µL Final    Monocytes Absolute 05/16/2018 0 55  0 17 - 1 22 Thousand/µL Final    Eosinophils Absolute 05/16/2018 0 20  0 00 - 0 61 Thousand/µL Final    Basophils Absolute 05/16/2018 0 02  0 00 - 0 10 Thousands/µL Final    Hemoglobin A1C 05/16/2018 7 9* 4 2 - 6 3 % Final    EAG 05/16/2018 180  mg/dl Final           Current Medications     Current Outpatient Prescriptions:     acetaminophen (TYLENOL) 325 mg tablet, Take by mouth, Disp: , Rfl:     BASAGLAR KWIKPEN injection pen 100 units/mL, Inject 0 3 mL (30 Units total) under the skin daily, Disp: 12 mL, Rfl: 0    gabapentin (NEURONTIN) 300 mg capsule, Take 1 capsule (300 mg total) by mouth 2 (two) times a day, Disp: 60 capsule, Rfl: 0    glucose blood test strip, TEST BLOOD SUGAR TWICE DAILY, Disp: , Rfl:     Insulin Pen Needle (PEN NEEDLES 31GX5/16") 31G X 8 MM MISC, by Does not apply route daily, Disp: 100 each, Rfl: 2    Lancets (FREESTYLE) lancets, TEST BLOOD SUGAR 2 TIMES A DAY , Disp: , Rfl:     lisinopril-hydrochlorothiazide (PRINZIDE,ZESTORETIC) 20-12 5 MG per tablet, Take 1 tablet by mouth daily, Disp: 30 tablet, Rfl: 5    metFORMIN (GLUCOPHAGE) 500 mg tablet, Take 2 tablets by mouth 2 (two) times a day, Disp: , Rfl:     sitaGLIPtin (JANUVIA) 100 mg tablet, Take 1 tablet by mouth daily, Disp: , Rfl:     acetaminophen-codeine (TYLENOL #3) 300-30 mg per tablet, Take 1 tablet by mouth every 4 (four) hours as needed for moderate pain, Disp: 30 tablet, Rfl: 0    albuterol (PROAIR HFA) 90 mcg/act inhaler, ProAir HFA 90 mcg/actuation aerosol inhaler, Disp: , Rfl:     Cholecalciferol (VITAMIN D) 2000 units CAPS, Vitamin D2 50,000 unit capsule, Disp: , Rfl:     metFORMIN (GLUCOPHAGE) 500 mg tablet, TAKE 2 TABLETS TWICE DAILY, Disp: 120 tablet, Rfl: 0    rosuvastatin (CRESTOR) 10 MG tablet, rosuvastatin 10 mg tablet, Disp: , Rfl:       Ashvin Thomas MD

## 2018-06-25 DIAGNOSIS — E11.9 TYPE 2 DIABETES MELLITUS WITHOUT COMPLICATION, WITH LONG-TERM CURRENT USE OF INSULIN (HCC): ICD-10-CM

## 2018-06-25 DIAGNOSIS — Z79.4 TYPE 2 DIABETES MELLITUS WITHOUT COMPLICATION, WITH LONG-TERM CURRENT USE OF INSULIN (HCC): ICD-10-CM

## 2018-07-08 ENCOUNTER — OFFICE VISIT (OUTPATIENT)
Dept: URGENT CARE | Age: 68
End: 2018-07-08
Payer: MEDICARE

## 2018-07-08 VITALS
BODY MASS INDEX: 29.07 KG/M2 | SYSTOLIC BLOOD PRESSURE: 139 MMHG | TEMPERATURE: 100 F | DIASTOLIC BLOOD PRESSURE: 72 MMHG | OXYGEN SATURATION: 97 % | RESPIRATION RATE: 16 BRPM | HEART RATE: 96 BPM | HEIGHT: 61 IN | WEIGHT: 154 LBS

## 2018-07-08 DIAGNOSIS — N30.90 CYSTITIS: Primary | ICD-10-CM

## 2018-07-08 PROCEDURE — 99213 OFFICE O/P EST LOW 20 MIN: CPT | Performed by: PHYSICIAN ASSISTANT

## 2018-07-08 PROCEDURE — 87147 CULTURE TYPE IMMUNOLOGIC: CPT | Performed by: PHYSICIAN ASSISTANT

## 2018-07-08 PROCEDURE — 87086 URINE CULTURE/COLONY COUNT: CPT | Performed by: PHYSICIAN ASSISTANT

## 2018-07-08 PROCEDURE — G0463 HOSPITAL OUTPT CLINIC VISIT: HCPCS | Performed by: PHYSICIAN ASSISTANT

## 2018-07-08 RX ORDER — CEPHALEXIN 500 MG/1
500 CAPSULE ORAL EVERY 6 HOURS SCHEDULED
Qty: 28 CAPSULE | Refills: 0 | Status: SHIPPED | OUTPATIENT
Start: 2018-07-08 | End: 2018-07-15

## 2018-07-08 NOTE — PATIENT INSTRUCTIONS
Take medications as directed  Drink plenty of fluids  Follow up with family doctor this week  Go to ER immediately if new or worsening symptoms occur  Urinary Tract Infection in Women   WHAT YOU NEED TO KNOW:   A urinary tract infection (UTI) is caused by bacteria that get inside your urinary tract  Most bacteria that enter your urinary tract come out when you urinate  If the bacteria stay in your urinary tract, you may get an infection  Your urinary tract includes your kidneys, ureters, bladder, and urethra  Urine is made in your kidneys, and it flows from the ureters to the bladder  Urine leaves the bladder through the urethra  A UTI is more common in your lower urinary tract, which includes your bladder and urethra  DISCHARGE INSTRUCTIONS:   Return to the emergency department if:   · You are urinating very little or not at all  · You have a high fever with shaking chills  · You have side or back pain that gets worse  Contact your healthcare provider if:   · You have a fever  · You do not feel better after 2 days of taking antibiotics  · You are vomiting  · You have questions or concerns about your condition or care  Medicines:   · Antibiotics  help fight a bacterial infection  · Medicines  may be given to decrease pain and burning when you urinate  They will also help decrease the feeling that you need to urinate often  These medicines will make your urine orange or red  · Take your medicine as directed  Contact your healthcare provider if you think your medicine is not helping or if you have side effects  Tell him or her if you are allergic to any medicine  Keep a list of the medicines, vitamins, and herbs you take  Include the amounts, and when and why you take them  Bring the list or the pill bottles to follow-up visits  Carry your medicine list with you in case of an emergency    Follow up with your healthcare provider as directed:  Write down your questions so you remember to ask them during your visits  Prevent another UTI:   · Empty your bladder often  Urinate and empty your bladder as soon as you feel the need  Do not hold your urine for long periods of time  · Wipe from front to back after you urinate or have a bowel movement  This will help prevent germs from getting into your urinary tract through your urethra  · Drink liquids as directed  Ask how much liquid to drink each day and which liquids are best for you  You may need to drink more liquids than usual to help flush out the bacteria  Do not drink alcohol, caffeine, or citrus juices  These can irritate your bladder and increase your symptoms  Your healthcare provider may recommend cranberry juice to help prevent a UTI  · Urinate after you have sex  This can help flush out bacteria passed during sex  · Do not douche or use feminine deodorants  These can change the chemical balance in your vagina  · Change sanitary pads or tampons often  This will help prevent germs from getting into your urinary tract  · Do pelvic muscle exercises often  Pelvic muscle exercises may help you start and stop urinating  Strong pelvic muscles may help you empty your bladder easier  Squeeze these muscles tightly for 5 seconds like you are trying to hold back urine  Then relax for 5 seconds  Gradually work up to squeezing for 10 seconds  Do 3 sets of 15 repetitions a day, or as directed  © 2017 [address] Information is for End User's use only and may not be sold, redistributed or otherwise used for commercial purposes  All illustrations and images included in CareNotes® are the copyrighted property of [employer]  or [employer]  The above information is an  only  It is not intended as medical advice for individual conditions or treatments  Talk to your doctor, nurse or pharmacist before following any medical regimen to see if it is safe and effective for you

## 2018-07-08 NOTE — PROGRESS NOTES
[facility] [employer] [facility]        NAME: Mena Swain is a 80 y o  female  : 1950    MRN: [id number]  DATE: 2018  TIME: 6:28 PM    Assessment and Plan   Cystitis [N30 90]  1  Cystitis  Urine culture     Urinalysis shows moderate leukocytes trace blood    Patient Instructions       Take medications as directed  Drink plenty of fluids  Follow up with family doctor this week  Go to ER immediately if new or worsening symptoms occur  Chief Complaint     Chief Complaint   Patient presents with    Abdominal Pain     since early am         History of Present Illness       Abdominal Pain   This is a new problem  The current episode started today  The onset quality is sudden  The problem occurs constantly  The problem has been waxing and waning  The pain is located in the suprapubic region  The pain is at a severity of 5/10  The pain is moderate  The quality of the pain is aching  The abdominal pain does not radiate  Associated symptoms include dysuria and a fever  Pertinent negatives include no anorexia, belching, constipation, diarrhea, frequency, headaches, hematochezia, hematuria, melena, myalgias, nausea, vomiting or weight loss  Associated symptoms comments: Eating and drinking normally  Last BM this morning    Nothing aggravates the pain  The pain is relieved by nothing  She has tried nothing for the symptoms  Review of Systems   Review of Systems   Constitutional: Positive for fever  Negative for chills, diaphoresis, fatigue and weight loss  HENT: Negative  Eyes: Negative  Respiratory: Negative for cough, chest tightness and shortness of breath  Cardiovascular: Negative for chest pain and palpitations  Gastrointestinal: Positive for abdominal pain (Suprapubic)  Negative for abdominal distention, anal bleeding, anorexia, blood in stool, constipation, diarrhea, hematochezia, melena, nausea and vomiting  Endocrine: Negative  Genitourinary: Positive for dysuria   Negative for frequency, hematuria, pelvic pain, urgency, vaginal bleeding, vaginal discharge and vaginal pain  Musculoskeletal: Negative for back pain, myalgias and neck pain  Skin: Negative for pallor and rash  Allergic/Immunologic: Negative  Neurological: Negative for dizziness, syncope and headaches  Hematological: Negative  Psychiatric/Behavioral: Negative            Current Medications       Current Outpatient Prescriptions:     acetaminophen (TYLENOL) 325 mg tablet, Take by mouth, Disp: , Rfl:     acetaminophen-codeine (TYLENOL #3) 300-30 mg per tablet, Take 1 tablet by mouth every 4 (four) hours as needed for moderate pain, Disp: 30 tablet, Rfl: 0    albuterol (PROAIR HFA) 90 mcg/act inhaler, ProAir HFA 90 mcg/actuation aerosol inhaler, Disp: , Rfl:     Cholecalciferol (VITAMIN D) 2000 units CAPS, Vitamin D2 50,000 unit capsule, Disp: , Rfl:     gabapentin (NEURONTIN) 300 mg capsule, Take 1 capsule (300 mg total) by mouth 2 (two) times a day, Disp: 60 capsule, Rfl: 0    glucose blood test strip, TEST BLOOD SUGAR TWICE DAILY, Disp: , Rfl:     insulin glargine (BASAGLAR KWIKPEN) 100 units/mL injection pen, Inject 30 Units under the skin daily, Disp: 5 pen, Rfl: 1    Insulin Pen Needle (PEN NEEDLES 31GX5/16") 31G X 8 MM MISC, by Does not apply route daily, Disp: 100 each, Rfl: 2    Lancets (FREESTYLE) lancets, TEST BLOOD SUGAR 2 TIMES A DAY , Disp: , Rfl:     lisinopril-hydrochlorothiazide (PRINZIDE,ZESTORETIC) 20-12 5 MG per tablet, Take 1 tablet by mouth daily, Disp: 30 tablet, Rfl: 5    metFORMIN (GLUCOPHAGE) 500 mg tablet, Take 2 tablets by mouth 2 (two) times a day, Disp: , Rfl:     metFORMIN (GLUCOPHAGE) 500 mg tablet, TAKE 2 TABLETS TWICE DAILY, Disp: 120 tablet, Rfl: 0    rosuvastatin (CRESTOR) 10 MG tablet, rosuvastatin 10 mg tablet, Disp: , Rfl:     sitaGLIPtin (JANUVIA) 100 mg tablet, Take 1 tablet by mouth daily, Disp: , Rfl:     Current Allergies     Allergies as of 07/08/2018 - Reviewed 07/08/2018   Allergen Reaction Noted    Pioglitazone  06/25/2012    Other Rash             The following portions of the patient's history were reviewed and updated as appropriate: allergies, current medications, past family history, past medical history, past social history, past surgical history and problem list      Past Medical History:   Diagnosis Date    Diabetes mellitus ([address] )     Hyperlipidemia     Hypertension        Past Surgical History:   Procedure Laterality Date    CARPAL TUNNEL RELEASE      DILATION AND CURETTAGE OF UTERUS      HYSTERECTOMY      Total Abdominal       Family History   Problem Relation Age of Onset    Diabetes Father     Heart attack Father     Uterine cancer Maternal Grandmother          Medications have been verified  Objective   /72   Pulse 96   Temp 100 °F (37 8 °C)   Resp 16   Ht 5' 1" (1 549 m)   Wt 69 9 kg (154 lb)   SpO2 97%   BMI 29 10 kg/m²        Physical Exam     Physical Exam   Constitutional: She appears well-developed and well-nourished  No distress  HENT:   Head: Normocephalic and atraumatic  Right Ear: External ear normal    Left Ear: External ear normal    Nose: Nose normal    Mouth/Throat: Oropharynx is clear and moist  No oropharyngeal exudate  Eyes: Conjunctivae are normal  Right eye exhibits no discharge  Left eye exhibits no discharge  Neck: Normal range of motion  Neck supple  Cardiovascular: Normal rate, regular rhythm, normal heart sounds and intact distal pulses  Pulmonary/Chest: Effort normal and breath sounds normal  No respiratory distress  She has no wheezes  She has no rales  Abdominal: Soft  Bowel sounds are normal  She exhibits no distension and no mass  There is tenderness (Mild suprapubic tenderness)  There is no rebound and no guarding  No CVA tenderness   Lymphadenopathy:     She has no cervical adenopathy  Skin: Skin is warm  No rash noted  She is not diaphoretic     Nursing note and vitals reviewed

## 2018-07-10 LAB
BACTERIA UR CULT: NORMAL
BACTERIA UR CULT: NORMAL

## 2018-07-18 ENCOUNTER — APPOINTMENT (OUTPATIENT)
Dept: LAB | Age: 68
End: 2018-07-18
Payer: MEDICARE

## 2018-07-18 ENCOUNTER — TELEPHONE (OUTPATIENT)
Dept: INTERNAL MEDICINE CLINIC | Facility: CLINIC | Age: 68
End: 2018-07-18

## 2018-07-18 DIAGNOSIS — Z79.4 TYPE 2 DIABETES MELLITUS WITH HYPERGLYCEMIA, WITH LONG-TERM CURRENT USE OF INSULIN (HCC): Primary | ICD-10-CM

## 2018-07-18 DIAGNOSIS — N30.00 ACUTE CYSTITIS WITHOUT HEMATURIA: Primary | ICD-10-CM

## 2018-07-18 DIAGNOSIS — E11.65 TYPE 2 DIABETES MELLITUS WITH HYPERGLYCEMIA, WITH LONG-TERM CURRENT USE OF INSULIN (HCC): Primary | ICD-10-CM

## 2018-07-18 LAB
BILIRUB UR QL STRIP: NEGATIVE
CLARITY UR: CLEAR
COLOR UR: YELLOW
GLUCOSE UR STRIP-MCNC: ABNORMAL MG/DL
HGB UR QL STRIP.AUTO: NEGATIVE
KETONES UR STRIP-MCNC: NEGATIVE MG/DL
LEUKOCYTE ESTERASE UR QL STRIP: NEGATIVE
NITRITE UR QL STRIP: NEGATIVE
PH UR STRIP.AUTO: 6 [PH] (ref 4.5–8)
PROT UR STRIP-MCNC: NEGATIVE MG/DL
SP GR UR STRIP.AUTO: 1.02 (ref 1–1.03)
UROBILINOGEN UR QL STRIP.AUTO: 0.2 E.U./DL

## 2018-07-18 PROCEDURE — 81003 URINALYSIS AUTO W/O SCOPE: CPT

## 2018-07-18 RX ORDER — NITROFURANTOIN 25; 75 MG/1; MG/1
CAPSULE ORAL
Qty: 10 CAPSULE | Refills: 0 | Status: SHIPPED | OUTPATIENT
Start: 2018-07-18 | End: 2018-07-19

## 2018-07-18 NOTE — TELEPHONE ENCOUNTER
Patient called stating that she was seen at the urgent care facility on 7/8/18 for a UTI  She was given Cephalexin 500mg which she finished  She sates that since yesterday she has had terrible frequency and pressure  Wondering if she has an infection again  As per Dr Nowell Crigler, she needs to check a UA and Urine C&S and after that she may start Macrobid 100mg 1 tab twice a day for 5 days    The patient understood the instructions

## 2018-07-19 ENCOUNTER — TELEPHONE (OUTPATIENT)
Dept: INTERNAL MEDICINE CLINIC | Facility: CLINIC | Age: 68
End: 2018-07-19

## 2018-07-19 ENCOUNTER — HOSPITAL ENCOUNTER (EMERGENCY)
Facility: HOSPITAL | Age: 68
Discharge: HOME | End: 2018-07-19
Attending: EMERGENCY MEDICINE | Admitting: EMERGENCY MEDICINE
Payer: MEDICARE

## 2018-07-19 ENCOUNTER — APPOINTMENT (EMERGENCY)
Dept: CT IMAGING | Facility: HOSPITAL | Age: 68
End: 2018-07-19
Payer: MEDICARE

## 2018-07-19 VITALS
WEIGHT: 152 LBS | HEART RATE: 115 BPM | TEMPERATURE: 100.8 F | SYSTOLIC BLOOD PRESSURE: 148 MMHG | DIASTOLIC BLOOD PRESSURE: 68 MMHG | RESPIRATION RATE: 18 BRPM | OXYGEN SATURATION: 96 % | BODY MASS INDEX: 28.72 KG/M2

## 2018-07-19 DIAGNOSIS — R11.10 VOMITING: ICD-10-CM

## 2018-07-19 DIAGNOSIS — E86.9 VOLUME DEPLETION: ICD-10-CM

## 2018-07-19 DIAGNOSIS — K52.9 ENTERITIS: Primary | ICD-10-CM

## 2018-07-19 LAB
ALBUMIN SERPL BCP-MCNC: 4.3 G/DL (ref 3.5–5)
ALP SERPL-CCNC: 52 U/L (ref 46–116)
ALT SERPL W P-5'-P-CCNC: 30 U/L (ref 12–78)
ANION GAP SERPL CALCULATED.3IONS-SCNC: 8 MMOL/L (ref 4–13)
AST SERPL W P-5'-P-CCNC: 21 U/L (ref 5–45)
BASOPHILS # BLD AUTO: 0.01 THOUSANDS/ΜL (ref 0–0.1)
BASOPHILS NFR BLD AUTO: 0 % (ref 0–1)
BILIRUB SERPL-MCNC: 0.34 MG/DL (ref 0.2–1)
BILIRUB UR QL STRIP: NEGATIVE
BUN SERPL-MCNC: 14 MG/DL (ref 5–25)
CALCIUM SERPL-MCNC: 9.2 MG/DL (ref 8.3–10.1)
CHLORIDE SERPL-SCNC: 100 MMOL/L (ref 100–108)
CLARITY UR: CLEAR
CO2 SERPL-SCNC: 29 MMOL/L (ref 21–32)
COLOR UR: YELLOW
CREAT SERPL-MCNC: 0.89 MG/DL (ref 0.6–1.3)
EOSINOPHIL # BLD AUTO: 0.05 THOUSAND/ΜL (ref 0–0.61)
EOSINOPHIL NFR BLD AUTO: 0 % (ref 0–6)
ERYTHROCYTE [DISTWIDTH] IN BLOOD BY AUTOMATED COUNT: 13 % (ref 11.6–15.1)
GFR SERPL CREATININE-BSD FRML MDRD: 67 ML/MIN/1.73SQ M
GLUCOSE SERPL-MCNC: 179 MG/DL (ref 65–140)
GLUCOSE UR STRIP-MCNC: NEGATIVE MG/DL
HCT VFR BLD AUTO: 38.6 % (ref 34.8–46.1)
HGB BLD-MCNC: 12.6 G/DL (ref 11.5–15.4)
HGB UR QL STRIP.AUTO: NEGATIVE
KETONES UR STRIP-MCNC: ABNORMAL MG/DL
LACTATE SERPL-SCNC: 3 MMOL/L (ref 0.5–2)
LACTATE SERPL-SCNC: 3.2 MMOL/L (ref 0.5–2)
LEUKOCYTE ESTERASE UR QL STRIP: NEGATIVE
LIPASE SERPL-CCNC: 138 U/L (ref 73–393)
LYMPHOCYTES # BLD AUTO: 0.69 THOUSANDS/ΜL (ref 0.6–4.47)
LYMPHOCYTES NFR BLD AUTO: 5 % (ref 14–44)
MCH RBC QN AUTO: 29.4 PG (ref 26.8–34.3)
MCHC RBC AUTO-ENTMCNC: 32.6 G/DL (ref 31.4–37.4)
MCV RBC AUTO: 90 FL (ref 82–98)
MONOCYTES # BLD AUTO: 0.35 THOUSAND/ΜL (ref 0.17–1.22)
MONOCYTES NFR BLD AUTO: 2 % (ref 4–12)
NEUTROPHILS # BLD AUTO: 13.24 THOUSANDS/ΜL (ref 1.85–7.62)
NEUTS SEG NFR BLD AUTO: 92 % (ref 43–75)
NITRITE UR QL STRIP: NEGATIVE
NRBC BLD AUTO-RTO: 0 /100 WBCS
PH UR STRIP.AUTO: 5.5 [PH] (ref 4.5–8)
PLATELET # BLD AUTO: 224 THOUSANDS/UL (ref 149–390)
PMV BLD AUTO: 11.1 FL (ref 8.9–12.7)
POTASSIUM SERPL-SCNC: 4 MMOL/L (ref 3.5–5.3)
PROT SERPL-MCNC: 7.5 G/DL (ref 6.4–8.2)
PROT UR STRIP-MCNC: NEGATIVE MG/DL
RBC # BLD AUTO: 4.28 MILLION/UL (ref 3.81–5.12)
SODIUM SERPL-SCNC: 137 MMOL/L (ref 136–145)
SP GR UR STRIP.AUTO: 1.02 (ref 1–1.03)
UROBILINOGEN UR QL STRIP.AUTO: 0.2 E.U./DL
WBC # BLD AUTO: 14.34 THOUSAND/UL (ref 4.31–10.16)

## 2018-07-19 PROCEDURE — 74177 CT ABD & PELVIS W/CONTRAST: CPT

## 2018-07-19 PROCEDURE — 85025 COMPLETE CBC W/AUTO DIFF WBC: CPT | Performed by: EMERGENCY MEDICINE

## 2018-07-19 PROCEDURE — 81003 URINALYSIS AUTO W/O SCOPE: CPT

## 2018-07-19 PROCEDURE — 83605 ASSAY OF LACTIC ACID: CPT | Performed by: EMERGENCY MEDICINE

## 2018-07-19 PROCEDURE — 96374 THER/PROPH/DIAG INJ IV PUSH: CPT

## 2018-07-19 PROCEDURE — 83690 ASSAY OF LIPASE: CPT | Performed by: EMERGENCY MEDICINE

## 2018-07-19 PROCEDURE — 36415 COLL VENOUS BLD VENIPUNCTURE: CPT | Performed by: EMERGENCY MEDICINE

## 2018-07-19 PROCEDURE — 99284 EMERGENCY DEPT VISIT MOD MDM: CPT

## 2018-07-19 PROCEDURE — 96361 HYDRATE IV INFUSION ADD-ON: CPT

## 2018-07-19 PROCEDURE — 80053 COMPREHEN METABOLIC PANEL: CPT | Performed by: EMERGENCY MEDICINE

## 2018-07-19 RX ORDER — ACETAMINOPHEN 325 MG/1
975 TABLET ORAL ONCE
Status: COMPLETED | OUTPATIENT
Start: 2018-07-19 | End: 2018-07-19

## 2018-07-19 RX ORDER — FAMOTIDINE 20 MG/1
20 TABLET, FILM COATED ORAL 2 TIMES DAILY
Qty: 28 TABLET | Refills: 0 | Status: SHIPPED | OUTPATIENT
Start: 2018-07-19 | End: 2018-08-29 | Stop reason: ALTCHOICE

## 2018-07-19 RX ORDER — ONDANSETRON 2 MG/ML
4 INJECTION INTRAMUSCULAR; INTRAVENOUS ONCE
Status: COMPLETED | OUTPATIENT
Start: 2018-07-19 | End: 2018-07-19

## 2018-07-19 RX ORDER — ONDANSETRON 4 MG/1
4 TABLET, ORALLY DISINTEGRATING ORAL EVERY 6 HOURS PRN
Qty: 8 TABLET | Refills: 0 | Status: SHIPPED | OUTPATIENT
Start: 2018-07-19 | End: 2018-08-29 | Stop reason: ALTCHOICE

## 2018-07-19 RX ADMIN — ONDANSETRON 4 MG: 2 INJECTION INTRAMUSCULAR; INTRAVENOUS at 10:42

## 2018-07-19 RX ADMIN — IOHEXOL 100 ML: 350 INJECTION, SOLUTION INTRAVENOUS at 12:17

## 2018-07-19 RX ADMIN — SODIUM CHLORIDE 1000 ML: 0.9 INJECTION, SOLUTION INTRAVENOUS at 10:42

## 2018-07-19 RX ADMIN — ACETAMINOPHEN 975 MG: 325 TABLET, FILM COATED ORAL at 10:42

## 2018-07-19 RX ADMIN — SODIUM CHLORIDE 1000 ML: 0.9 INJECTION, SOLUTION INTRAVENOUS at 11:39

## 2018-07-19 NOTE — ED PROVIDER NOTES
History  Chief Complaint   Patient presents with    Vomiting     Started 10 minutes   Abdominal Pain     Started this am      81 yo female, with IDDM, HTN, c/o onset of generalized malaise, chills, rigors, about 7am, went about her morning activities, with plans to go to breakfast and out to the [facility] in [place], began to feel much sicker at [employer], and subsequently vomited 3 times  They never actually ordered, and came to the ED from there  She first experienced urinary starting 7/8/18, and was prescribed cephalexin by [facility]  She continued to have the urinary symptoms and was changed to macrodantin by Dr Rafael Dickerson with a phone call yesterday, and was going to have a UA done before onset of new symptoms today  She denies specifically localizing abdominal pain or back pain  History provided by:  Patient      Prior to Admission Medications   Prescriptions Last Dose Informant Patient Reported?  Taking?   insulin glargine (BASAGLAR KWIKPEN) 100 units/mL injection pen   No Yes   Sig: Inject 30 Units under the skin daily   Patient taking differently: Inject 30 Units under the skin daily at bedtime     lisinopril-hydrochlorothiazide (PRINZIDE,ZESTORETIC) 20-12 5 MG per tablet   No Yes   Sig: Take 1 tablet by mouth daily   metFORMIN (GLUCOPHAGE) 500 mg tablet   Yes Yes   Sig: Take 1,000 mg by mouth 2 (two) times a day     sitaGLIPtin (JANUVIA) 100 mg tablet   No Yes   Sig: Take 1 tablet (100 mg total) by mouth daily      Facility-Administered Medications: None       Past Medical History:   Diagnosis Date    Diabetes mellitus ([facility] [address] )     Hyperlipidemia     Hypertension        Past Surgical History:   Procedure Laterality Date    CARPAL TUNNEL RELEASE      DILATION AND CURETTAGE OF UTERUS      HYSTERECTOMY      Total Abdominal       Family History   Problem Relation Age of Onset    Diabetes Father     Heart attack Father     Uterine cancer Maternal Grandmother      I have reviewed and agree with the history as documented  Social History   Substance Use Topics    Smoking status: Never Smoker    Smokeless tobacco: Never Used    Alcohol use No        Review of Systems   Constitutional: Positive for fatigue and fever  Negative for appetite change and chills  HENT: Negative for sore throat  Respiratory: Negative for cough, shortness of breath and wheezing  Cardiovascular: Negative for chest pain and palpitations  Gastrointestinal: Positive for nausea and vomiting  Negative for abdominal pain and diarrhea  Genitourinary: Positive for frequency  Negative for dysuria and hematuria  Musculoskeletal: Negative for neck pain  Skin: Negative for rash  Neurological: Negative for dizziness, weakness and headaches  Psychiatric/Behavioral: Negative for suicidal ideas  All other systems reviewed and are negative  Physical Exam  Physical Exam   Constitutional: She is oriented to person, place, and time  Vital signs are normal  She appears well-developed and well-nourished  Non-toxic appearance  She appears ill  HENT:   Head: Normocephalic and atraumatic  Right Ear: Tympanic membrane and external ear normal    Left Ear: Tympanic membrane and external ear normal    Nose: Nose normal    Mouth/Throat: Oropharynx is clear and moist    Eyes: Conjunctivae and EOM are normal  Pupils are equal, round, and reactive to light  Neck: Normal range of motion and full passive range of motion without pain  Neck supple  No Brudzinski's sign and no Kernig's sign noted  Cardiovascular: Normal rate, regular rhythm, normal heart sounds, intact distal pulses and normal pulses  No murmur heard  Pulmonary/Chest: Effort normal and breath sounds normal  No tachypnea  No respiratory distress  She has no wheezes  Abdominal: Soft  Bowel sounds are normal  She exhibits no distension  There is no tenderness  There is no rigidity, no rebound and no guarding     Musculoskeletal: Normal range of motion  Right lower leg: She exhibits no swelling  Left lower leg: She exhibits no swelling  Lymphadenopathy:     She has no cervical adenopathy  Neurological: She is alert and oriented to person, place, and time  She has normal strength and normal reflexes  No cranial nerve deficit or sensory deficit  Coordination and gait normal  GCS eye subscore is 4  GCS verbal subscore is 5  GCS motor subscore is 6  Skin: Skin is warm and dry  Capillary refill takes 2 to 3 seconds  No rash noted  She is not diaphoretic  There is pallor  Psychiatric: She has a normal mood and affect  Her speech is normal and behavior is normal  Judgment and thought content normal  Cognition and memory are normal    Nursing note and vitals reviewed        Vital Signs  ED Triage Vitals   Temperature Pulse Respirations Blood Pressure SpO2   07/19/18 1017 07/19/18 1017 07/19/18 1017 07/19/18 1017 07/19/18 1017   (!) 103 1 °F (39 5 °C) (!) 120 20 137/82 96 %      Temp Source Heart Rate Source Patient Position - Orthostatic VS BP Location FiO2 (%)   07/19/18 1017 07/19/18 1116 07/19/18 1017 07/19/18 1017 --   Oral Monitor Sitting Right arm       Pain Score       07/19/18 1017       6           Vitals:    07/19/18 1017 07/19/18 1116 07/19/18 1221   BP: 137/82 138/63 148/68   Pulse: (!) 120 (!) 115 (!) 115   Patient Position - Orthostatic VS: Sitting Lying Lying       Visual Acuity      ED Medications  Medications   sodium chloride 0 9 % bolus 1,000 mL (0 mL Intravenous Stopped 7/19/18 1138)   ondansetron (ZOFRAN) injection 4 mg (4 mg Intravenous Given 7/19/18 1042)   acetaminophen (TYLENOL) tablet 975 mg (975 mg Oral Given 7/19/18 1042)   sodium chloride 0 9 % bolus 1,000 mL (0 mL Intravenous Stopped 7/19/18 1337)   iohexol (OMNIPAQUE) 350 MG/ML injection (MULTI-DOSE) 100 mL (100 mL Intravenous Given 7/19/18 1217)       Diagnostic Studies  Results Reviewed     Procedure Component Value Units Date/Time    Lactic acid x2 Q2H [[id number]]  (Abnormal) Collected:  07/19/18 1237    Lab Status:  Final result Specimen:  Blood from Arm, Right Updated:  07/19/18 1315     LACTIC ACID 3 0 (HH) mmol/L     Narrative:         Result may be elevated if tourniquet was used during collection  CBC and differential [[id number]]  (Abnormal) Collected:  07/19/18 1042    Lab Status:  Final result Specimen:  Blood from Arm, Right Updated:  07/19/18 1133     WBC 14 34 (H) Thousand/uL      RBC 4 28 Million/uL      Hemoglobin 12 6 g/dL      Hematocrit 38 6 %      MCV 90 fL      MCH 29 4 pg      MCHC 32 6 g/dL      RDW 13 0 %      MPV 11 1 fL      Platelets 170 Thousands/uL      nRBC 0 /100 WBCs      Neutrophils Relative 92 (H) %      Lymphocytes Relative 5 (L) %      Monocytes Relative 2 (L) %      Eosinophils Relative 0 %      Basophils Relative 0 %      Neutrophils Absolute 13 24 (H) Thousands/µL      Lymphocytes Absolute 0 69 Thousands/µL      Monocytes Absolute 0 35 Thousand/µL      Eosinophils Absolute 0 05 Thousand/µL      Basophils Absolute 0 01 Thousands/µL     Narrative: This is an appended report  These results have been appended to a previously verified report  Lactic acid x2 Q2H [[id number]]  (Abnormal) Collected:  07/19/18 1042    Lab Status:  Final result Specimen:  Blood from Arm, Right Updated:  07/19/18 1124     LACTIC ACID 3 2 (HH) mmol/L     Narrative:         Result may be elevated if tourniquet was used during collection      Comprehensive metabolic panel [[id number]]  (Abnormal) Collected:  07/19/18 1042    Lab Status:  Final result Specimen:  Blood from Arm, Right Updated:  07/19/18 1117     Sodium 137 mmol/L      Potassium 4 0 mmol/L      Chloride 100 mmol/L      CO2 29 mmol/L      Anion Gap 8 mmol/L      BUN 14 mg/dL      Creatinine 0 89 mg/dL      Glucose 179 (H) mg/dL      Calcium 9 2 mg/dL      AST 21 U/L      ALT 30 U/L      Alkaline Phosphatase 52 U/L      Total Protein 7 5 g/dL      Albumin 4 3 g/dL      Total Bilirubin 0 34 mg/dL      eGFR 67 ml/min/1 73sq m     Narrative:         National Kidney Disease Education Program recommendations are as follows:  GFR calculation is accurate only with a steady state creatinine  Chronic Kidney disease less than 60 ml/min/1 73 sq  meters  Kidney failure less than 15 ml/min/1 73 sq  meters  Lipase [[id number]]  (Normal) Collected:  07/19/18 1042    Lab Status:  Final result Specimen:  Blood from Arm, Right Updated:  07/19/18 1117     Lipase 138 u/L     POCT urinalysis dipstick [[id number]]  (Abnormal) Resulted:  07/19/18 1045    Lab Status:  Final result Specimen:  Urine Updated:  07/19/18 1045    ED Urine Macroscopic [[id number]]  (Abnormal) Collected:  07/19/18 1050    Lab Status:  Final result Specimen:  Urine Updated:  07/19/18 1044     Color, UA Yellow     Clarity, UA Clear     pH, UA 5 5     Leukocytes, UA Negative     Nitrite, UA Negative     Protein, UA Negative mg/dl      Glucose, UA Negative mg/dl      Ketones, UA Trace (A) mg/dl      Urobilinogen, UA 0 2 E U /dl      Bilirubin, UA Negative     Blood, UA Negative     Specific Gravity, UA 1 020    Narrative:       CLINITEK RESULT                 CT abdomen pelvis with contrast   Final Result by Haley Diez MD (07/19 1254)      Multiple nondilated fluid-filled loops of small bowel which may be related to a gastroenteritis  No large or small bowel obstruction  No free air or free fluid  Diffuse fatty infiltration of the liver              Workstation performed: [id number]                    Procedures  CriticalCare Time  Performed by: Karli Loera  Authorized by: Karli Loera     Critical care provider statement:     Critical care time (minutes):  30    Critical care time was exclusive of:  Separately billable procedures and treating other patients and teaching time    Critical care was necessary to treat or prevent imminent or life-threatening deterioration of the following conditions:  Dehydration    Critical care was time spent personally by me on the following activities:  Blood draw for specimens, obtaining history from patient or surrogate, development of treatment plan with patient or surrogate, discussions with consultants, evaluation of patient's response to treatment, examination of patient, interpretation of cardiac output measurements, ordering and performing treatments and interventions, ordering and review of laboratory studies, ordering and review of radiographic studies, re-evaluation of patient's condition and review of old charts    I assumed direction of critical care for this patient from another provider in my specialty: no             Phone Contacts  ED Phone Contact    ED Course  ED Course as of Jul 19 1358   Thu Jul 19, 2018   1124 LACTIC ACID: (!!) 3 2   1132 She does not have a localizing abdominal pain, nor any respiratory or ongoing urinary symptoms  However, since she is sick with fever and elevated lactate which is attributable to volume depletion with urine ketosis, I am still inclined to do CTAP, to confirm no occult surgical process  1303 Most c/w gastroenteritis CT abdomen pelvis with contrast   1332 She is tolerating po, feels better, voiding again in the ED  Fever is decreasing with antipyretic  She is going to stop her abx, because the urine appears sterile today  She will rest today, hydrate, bland diet, and return if worsening                          Initial Sepsis Screening     Row Name 07/19/18 1320                Is the patient's history suggestive of a new or worsening infection? (!)  Yes (Proceed)  -RM        Suspected source of infection    acute enteritis  -RM        Are two or more of the following signs & symptoms of infection both present and new to the patient?  (!)  Yes (Proceed)  -RM        Indicate SIRS criteria          If the answer is yes to both questions, suspicion of sepsis is present          If severe sepsis is present AND tissue hypoperfusion perists in the hour after fluid resuscitation or lactate > 4, the patient meets criteria for SEPTIC SHOCK          Are any of the following organ dysfunction criteria present within 6 hours of suspected infection and SIRS criteria that are NOT considered to be chronic conditions? (!)  Yes  -RM        Organ dysfunction Lactate > 2 0 mmol/L  -RM        Date of presentation of severe sepsis          Time of presentation of severe sepsis          Tissue hypoperfusion persists in the hour after crystalloid fluid administration, evidenced, by either:          Was hypotension present within one hour of the conclusion of crystalloid fluid administration?         Date of presentation of septic shock          Time of presentation of septic shock            User Key  (r) = Recorded By, (t) = Taken By, (c) = Cosigned By    Initials Name Provider Type    RM Colon Organ, MD Physician                  MDM  CritCare Time    Disposition  Final diagnoses:   Enteritis   Vomiting   Volume depletion     Time reflects when diagnosis was documented in both MDM as applicable and the Disposition within this note     Time User Action Codes Description Comment    7/19/2018  1:33 PM Hayden Dioniciojose guadalupe Acute ischemic enteritis ([facility] [address] )     7/19/2018  1:33 PM [place] L Remove [K55 019] Acute ischemic enteritis ([facility] [address] )     7/19/2018  1:33 PM [place] L Add [K52 9] Enteritis     7/19/2018  1:33 PM Idalmis Glass Add [R11 10] Vomiting     7/19/2018  1:33 PM Idalmis Glass Add [E86 9] Volume depletion       ED Disposition     ED Disposition Condition Comment    Discharge  Svetlana Click discharge to home/self care  Condition at discharge: Good        Follow-up Information     Follow up With Specialties Details Why Contact Info    Ayaka Buenrostro MD Internal Medicine Call For followup [phone] [facility]            Discharge Medication List as of 7/19/2018 1:34 PM      START taking these medications    Details   famotidine (PEPCID) 20 mg tablet Take 1 tablet (20 mg total) by mouth 2 (two) times a day, Starting Thu 7/19/2018, Print      ondansetron (ZOFRAN-ODT) 4 mg disintegrating tablet Take 1 tablet (4 mg total) by mouth every 6 (six) hours as needed for nausea or vomiting, Starting Thu 7/19/2018, Print         CONTINUE these medications which have NOT CHANGED    Details   insulin glargine (BASAGLAR KWIKPEN) 100 units/mL injection pen Inject 30 Units under the skin daily, Starting Tue 6/26/2018, Normal      lisinopril-hydrochlorothiazide (PRINZIDE,ZESTORETIC) 20-12 5 MG per tablet Take 1 tablet by mouth daily, Starting Tue 3/6/2018, Normal      metFORMIN (GLUCOPHAGE) 500 mg tablet Take 1,000 mg by mouth 2 (two) times a day  , Historical Med      sitaGLIPtin (JANUVIA) 100 mg tablet Take 1 tablet (100 mg total) by mouth daily, Starting Wed 7/18/2018, Normal           No discharge procedures on file      ED Provider  Electronically Signed by           Vitaly Moreira MD  07/19/18 4238

## 2018-07-19 NOTE — DISCHARGE INSTRUCTIONS
Gastroenteritis   WHAT YOU NEED TO KNOW:   Gastroenteritis, or stomach flu, is an infection of the stomach and intestines  DISCHARGE INSTRUCTIONS:   Call 911 for any of the following:   · You have trouble breathing or a very fast pulse  Return to the emergency department if:   · You see blood in your diarrhea  · You cannot stop vomiting  · You have not urinated for 12 hours  · You feel like you are going to faint  Contact your healthcare provider if:   · You have a fever  · You continue to vomit or have diarrhea, even after treatment  · You see worms in your diarrhea  · Your mouth or eyes are dry  You are not urinating as much or as often  · You have questions or concerns about your condition or care  Manage your symptoms:   · Drink plenty of clear liquids (gatorade, ginger ale, etc)  · Eat bland foods  When you feel hungry, begin eating soft, bland foods  Examples are bananas, clear soup, potatoes, and applesauce  Do not have dairy products, alcohol, sugary drinks, or drinks with caffeine until you feel better  · Rest as much as possible  Slowly start to do more each day when you begin to feel better  Prevent the spread of gastroenteritis:  Gastroenteritis can spread easily  Keep yourself, your family, and your surroundings clean to help prevent the spread of gastroenteritis:  · Wash your hands often  Use soap and water  Wash your hands after you use the bathroom, change a child's diapers, or sneeze  Wash your hands before you prepare or eat food  · Clean surfaces and do laundry often  Wash your clothes and towels separately from the rest of the laundry  Clean surfaces in your home with antibacterial  or bleach

## 2018-07-19 NOTE — TELEPHONE ENCOUNTER
PC from PT re: continued Abdominal pain, chills  Concern with ABX  she is taking & possible reaction  OV with DR Alma Simon scheduled for today @ 10:45  Phone call from  on the way to [address] [place] [address] re: worsening condition & he will take her directly [facility] ER [facility] [place] notified

## 2018-07-19 NOTE — ED NOTES
Pt given water at this time for PO challenge  Will continue to monitor        Shavon Perea RN  07/19/18 0181

## 2018-07-31 DIAGNOSIS — E11.9 TYPE 2 DIABETES MELLITUS WITHOUT COMPLICATION, WITHOUT LONG-TERM CURRENT USE OF INSULIN (HCC): Primary | ICD-10-CM

## 2018-08-27 ENCOUNTER — APPOINTMENT (OUTPATIENT)
Dept: LAB | Facility: MEDICAL CENTER | Age: 68
End: 2018-08-27
Payer: MEDICARE

## 2018-08-27 DIAGNOSIS — Z79.4 TYPE 2 DIABETES MELLITUS WITH HYPERGLYCEMIA, WITH LONG-TERM CURRENT USE OF INSULIN (HCC): ICD-10-CM

## 2018-08-27 DIAGNOSIS — E11.65 TYPE 2 DIABETES MELLITUS WITH HYPERGLYCEMIA, WITH LONG-TERM CURRENT USE OF INSULIN (HCC): ICD-10-CM

## 2018-08-27 LAB
25(OH)D3 SERPL-MCNC: 18.3 NG/ML (ref 30–100)
ALBUMIN SERPL BCP-MCNC: 3.7 G/DL (ref 3.5–5)
ALP SERPL-CCNC: 49 U/L (ref 46–116)
ALT SERPL W P-5'-P-CCNC: 31 U/L (ref 12–78)
ANION GAP SERPL CALCULATED.3IONS-SCNC: 9 MMOL/L (ref 4–13)
AST SERPL W P-5'-P-CCNC: 17 U/L (ref 5–45)
BASOPHILS # BLD AUTO: 0.04 THOUSANDS/ΜL (ref 0–0.1)
BASOPHILS NFR BLD AUTO: 0 % (ref 0–1)
BILIRUB SERPL-MCNC: 0.4 MG/DL (ref 0.2–1)
BUN SERPL-MCNC: 17 MG/DL (ref 5–25)
CALCIUM SERPL-MCNC: 9.4 MG/DL (ref 8.3–10.1)
CHLORIDE SERPL-SCNC: 104 MMOL/L (ref 100–108)
CHOLEST SERPL-MCNC: 223 MG/DL (ref 50–200)
CO2 SERPL-SCNC: 26 MMOL/L (ref 21–32)
CREAT SERPL-MCNC: 0.75 MG/DL (ref 0.6–1.3)
CREAT UR-MCNC: 103 MG/DL
EOSINOPHIL # BLD AUTO: 0.19 THOUSAND/ΜL (ref 0–0.61)
EOSINOPHIL NFR BLD AUTO: 2 % (ref 0–6)
ERYTHROCYTE [DISTWIDTH] IN BLOOD BY AUTOMATED COUNT: 13.2 % (ref 11.6–15.1)
EST. AVERAGE GLUCOSE BLD GHB EST-MCNC: 166 MG/DL
GFR SERPL CREATININE-BSD FRML MDRD: 83 ML/MIN/1.73SQ M
GLUCOSE P FAST SERPL-MCNC: 116 MG/DL (ref 65–99)
HBA1C MFR BLD: 7.4 % (ref 4.2–6.3)
HCT VFR BLD AUTO: 37.3 % (ref 34.8–46.1)
HDLC SERPL-MCNC: 38 MG/DL (ref 40–60)
HGB BLD-MCNC: 11.9 G/DL (ref 11.5–15.4)
IMM GRANULOCYTES # BLD AUTO: 0.03 THOUSAND/UL (ref 0–0.2)
IMM GRANULOCYTES NFR BLD AUTO: 0 % (ref 0–2)
LDLC SERPL CALC-MCNC: 142 MG/DL (ref 0–100)
LYMPHOCYTES # BLD AUTO: 2.3 THOUSANDS/ΜL (ref 0.6–4.47)
LYMPHOCYTES NFR BLD AUTO: 23 % (ref 14–44)
MCH RBC QN AUTO: 28.8 PG (ref 26.8–34.3)
MCHC RBC AUTO-ENTMCNC: 31.9 G/DL (ref 31.4–37.4)
MCV RBC AUTO: 90 FL (ref 82–98)
MICROALBUMIN UR-MCNC: 11.8 MG/L (ref 0–20)
MICROALBUMIN/CREAT 24H UR: 11 MG/G CREATININE (ref 0–30)
MONOCYTES # BLD AUTO: 0.61 THOUSAND/ΜL (ref 0.17–1.22)
MONOCYTES NFR BLD AUTO: 6 % (ref 4–12)
NEUTROPHILS # BLD AUTO: 6.66 THOUSANDS/ΜL (ref 1.85–7.62)
NEUTS SEG NFR BLD AUTO: 69 % (ref 43–75)
NONHDLC SERPL-MCNC: 185 MG/DL
NRBC BLD AUTO-RTO: 0 /100 WBCS
PLATELET # BLD AUTO: 242 THOUSANDS/UL (ref 149–390)
PMV BLD AUTO: 12.2 FL (ref 8.9–12.7)
POTASSIUM SERPL-SCNC: 4 MMOL/L (ref 3.5–5.3)
PROT SERPL-MCNC: 7 G/DL (ref 6.4–8.2)
RBC # BLD AUTO: 4.13 MILLION/UL (ref 3.81–5.12)
SODIUM SERPL-SCNC: 139 MMOL/L (ref 136–145)
TRIGL SERPL-MCNC: 213 MG/DL
WBC # BLD AUTO: 9.83 THOUSAND/UL (ref 4.31–10.16)

## 2018-08-27 PROCEDURE — 82570 ASSAY OF URINE CREATININE: CPT | Performed by: INTERNAL MEDICINE

## 2018-08-27 PROCEDURE — 80061 LIPID PANEL: CPT | Performed by: INTERNAL MEDICINE

## 2018-08-27 PROCEDURE — 36415 COLL VENOUS BLD VENIPUNCTURE: CPT | Performed by: INTERNAL MEDICINE

## 2018-08-27 PROCEDURE — 80053 COMPREHEN METABOLIC PANEL: CPT | Performed by: INTERNAL MEDICINE

## 2018-08-27 PROCEDURE — 85025 COMPLETE CBC W/AUTO DIFF WBC: CPT | Performed by: INTERNAL MEDICINE

## 2018-08-27 PROCEDURE — 83036 HEMOGLOBIN GLYCOSYLATED A1C: CPT

## 2018-08-27 PROCEDURE — 82306 VITAMIN D 25 HYDROXY: CPT | Performed by: INTERNAL MEDICINE

## 2018-08-27 PROCEDURE — 82043 UR ALBUMIN QUANTITATIVE: CPT | Performed by: INTERNAL MEDICINE

## 2018-08-29 ENCOUNTER — OFFICE VISIT (OUTPATIENT)
Dept: INTERNAL MEDICINE CLINIC | Facility: CLINIC | Age: 68
End: 2018-08-29
Payer: MEDICARE

## 2018-08-29 VITALS
HEIGHT: 61 IN | TEMPERATURE: 97.8 F | WEIGHT: 153 LBS | BODY MASS INDEX: 28.89 KG/M2 | SYSTOLIC BLOOD PRESSURE: 138 MMHG | HEART RATE: 84 BPM | RESPIRATION RATE: 15 BRPM | DIASTOLIC BLOOD PRESSURE: 84 MMHG

## 2018-08-29 DIAGNOSIS — Z12.31 SCREENING MAMMOGRAM, ENCOUNTER FOR: ICD-10-CM

## 2018-08-29 DIAGNOSIS — Z79.4 TYPE 2 DIABETES MELLITUS WITH HYPERGLYCEMIA, WITH LONG-TERM CURRENT USE OF INSULIN (HCC): ICD-10-CM

## 2018-08-29 DIAGNOSIS — K21.00 GASTROESOPHAGEAL REFLUX DISEASE WITH ESOPHAGITIS: ICD-10-CM

## 2018-08-29 DIAGNOSIS — I10 ESSENTIAL HYPERTENSION: Primary | ICD-10-CM

## 2018-08-29 DIAGNOSIS — E11.65 TYPE 2 DIABETES MELLITUS WITH HYPERGLYCEMIA, WITH LONG-TERM CURRENT USE OF INSULIN (HCC): ICD-10-CM

## 2018-08-29 DIAGNOSIS — E11.9 TYPE 2 DIABETES MELLITUS WITHOUT COMPLICATION, WITHOUT LONG-TERM CURRENT USE OF INSULIN (HCC): ICD-10-CM

## 2018-08-29 DIAGNOSIS — E55.9 VITAMIN D DEFICIENCY: ICD-10-CM

## 2018-08-29 DIAGNOSIS — Z23 NEED FOR PNEUMOCOCCAL VACCINATION: ICD-10-CM

## 2018-08-29 DIAGNOSIS — E78.00 HYPERCHOLESTEROLEMIA: ICD-10-CM

## 2018-08-29 PROCEDURE — G0009 ADMIN PNEUMOCOCCAL VACCINE: HCPCS | Performed by: INTERNAL MEDICINE

## 2018-08-29 PROCEDURE — 99214 OFFICE O/P EST MOD 30 MIN: CPT | Performed by: INTERNAL MEDICINE

## 2018-08-29 PROCEDURE — 90732 PPSV23 VACC 2 YRS+ SUBQ/IM: CPT | Performed by: INTERNAL MEDICINE

## 2018-08-29 RX ORDER — LISINOPRIL AND HYDROCHLOROTHIAZIDE 20; 12.5 MG/1; MG/1
1 TABLET ORAL DAILY
Qty: 30 TABLET | Refills: 0 | Status: SHIPPED | OUTPATIENT
Start: 2018-08-29 | End: 2018-10-15 | Stop reason: SDUPTHER

## 2018-08-29 RX ORDER — ROSUVASTATIN CALCIUM 10 MG/1
10 TABLET, COATED ORAL DAILY
Qty: 90 TABLET | Refills: 3 | Status: SHIPPED | OUTPATIENT
Start: 2018-08-29 | End: 2019-11-01 | Stop reason: SDUPTHER

## 2018-08-30 ENCOUNTER — PATIENT OUTREACH (OUTPATIENT)
Dept: INTERNAL MEDICINE CLINIC | Facility: CLINIC | Age: 68
End: 2018-08-30

## 2018-08-30 DIAGNOSIS — E11.8 TYPE 2 DIABETES MELLITUS WITH COMPLICATION, WITH LONG-TERM CURRENT USE OF INSULIN (HCC): Primary | ICD-10-CM

## 2018-08-30 DIAGNOSIS — Z79.4 TYPE 2 DIABETES MELLITUS WITH COMPLICATION, WITH LONG-TERM CURRENT USE OF INSULIN (HCC): Primary | ICD-10-CM

## 2018-08-30 NOTE — PROGRESS NOTES
Outpatient Care Management Note: [facility]    I spoke with Malu Bangurardle & she said that she is feeling fine and she did  the Crestor and took the 1st dose last evening  She has no questions in regard to her medications or diet  I encouraged her to call the office with any questions

## 2018-08-30 NOTE — PROGRESS NOTES
[facility] [place]      NAME: Phil Dickey  AGE: 80 y o  SEX: female  : 1950   MRN: [id number]    DATE: 2018  TIME: 8:51 AM    Assessment and Plan   1  Essential hypertension  Well controlled  - lisinopril-hydrochlorothiazide (PRINZIDE,ZESTORETIC) 20-12 5 MG per tablet; Take 1 tablet by mouth daily  Dispense: 30 tablet; Refill: 0  - Comprehensive metabolic panel; Future    2  Type 2 diabetes mellitus with hyperglycemia, with long-term current use of insulin ([facility])  Hemoglobin A1c is improved  The patient will continue her current medications  I encouraged her to continue her dietary efforts at exercise regimen  - sitaGLIPtin (JANUVIA) 100 mg tablet; Take 1 tablet (100 mg total) by mouth daily  Dispense: 30 tablet; Refill: 0  - Hemoglobin A1C; Future    3  Hypercholesterolemia  Not satisfactorily controlled  The patient will start Crestor 10 mg daily  She had been on this in the past but withdrew from at because of leg cramps  However, she found that it did not really make any difference  - rosuvastatin (CRESTOR) 10 MG tablet; Take 1 tablet (10 mg total) by mouth daily  Dispense: 90 tablet; Refill: 3  - Lipid panel; Future    4  Vitamin D deficiency  The patient resume vitamin-D only yesterday  5  Gastroesophageal reflux disease with esophagitis  Minimally symptomatic at present  Off medication  6  Screening mammogram, encounter for  - Mammo screening bilateral w cad; Future    7  Need for pneumococcal vaccination  - PNEUMOCOCCAL POLYSACCHARIDE VACCINE 23-VALENT =>1YO SQ IM      Return to office in:   3 months    Chief Complaint     Chief Complaint   Patient presents with    Follow-up     3 months       History of Present Illness     The patient returned to the office for re-evaluation of hypertension, hypercholesterolemia, type 2 diabetes, gastroesophageal reflux, and vitamin-D deficiency  Since her last visit she has been feeling pretty well    She has no chest pain, shortness of breath, palpitations, or dizziness  She has been watching her diet  She has been trying to exercise  She has been having no problems with her medications  The following portions of the patient's history were reviewed and updated as appropriate: allergies, current medications, past family history, past medical history, past social history, past surgical history and problem list     Review of Systems   Review of Systems   Constitutional: Negative  HENT: Negative for congestion, ear pain, postnasal drip, rhinorrhea, sore throat and trouble swallowing  Eyes: Negative for pain, discharge, redness and visual disturbance  Respiratory: Negative for cough, shortness of breath and wheezing  Cardiovascular: Negative  Gastrointestinal: Negative  Endocrine: Negative  Genitourinary: Negative for difficulty urinating, dysuria, frequency, hematuria and urgency  Musculoskeletal: Negative for arthralgias, gait problem, joint swelling and myalgias  Skin: Negative for rash  Neurological: Negative for dizziness, speech difficulty, weakness, light-headedness, numbness and headaches  Hematological: Negative  Psychiatric/Behavioral: Negative for confusion, decreased concentration, dysphoric mood and sleep disturbance  The patient is not nervous/anxious  Active Problem List     Patient Active Problem List   Diagnosis    Allergic rhinitis    Hypertension    Gastroesophageal reflux disease with esophagitis    Hypercholesterolemia    Type 2 diabetes mellitus with hyperglycemia (HCC)    Vitamin D deficiency    Wrist joint pain    Sciatica of right side       Objective   /84 (BP Location: Left arm, Patient Position: Sitting, Cuff Size: Standard)   Pulse 84   Temp 97 8 °F (36 6 °C) (Tympanic)   Resp 15   Ht 5' 1" (1 549 m)   Wt 69 4 kg (153 lb)   BMI 28 91 kg/m²     Physical Exam   Constitutional: She is oriented to person, place, and time   She appears well-developed and well-nourished  No distress  HENT:   Head: Normocephalic and atraumatic  Neck: Neck supple  No JVD present  No tracheal deviation present  No thyromegaly present  Cardiovascular: Normal rate, regular rhythm, normal heart sounds and intact distal pulses  Exam reveals no gallop and no friction rub  No murmur heard  Pulmonary/Chest: Effort normal and breath sounds normal  She has no wheezes  She has no rales  She exhibits no tenderness  Abdominal: Soft  Bowel sounds are normal  She exhibits no distension and no mass  There is no tenderness  There is no rebound  Musculoskeletal: Normal range of motion  She exhibits no edema or tenderness  Lymphadenopathy:     She has no cervical adenopathy  Neurological: She is alert and oriented to person, place, and time  Skin: Skin is warm and dry  Psychiatric: She has a normal mood and affect   Her behavior is normal  Judgment and thought content normal        Pertinent Laboratory/Diagnostic Studies:  Appointment on 08/27/2018   Component Date Value Ref Range Status    Hemoglobin A1C 08/27/2018 7 4* 4 2 - 6 3 % Final    EAG 08/27/2018 166  mg/dl Final   Admission on 07/19/2018, Discharged on 07/19/2018   Component Date Value Ref Range Status    WBC 07/19/2018 14 34* 4 31 - 10 16 Thousand/uL Final    RBC 07/19/2018 4 28  3 81 - 5 12 Million/uL Final    Hemoglobin 07/19/2018 12 6  11 5 - 15 4 g/dL Final    Hematocrit 07/19/2018 38 6  34 8 - 46 1 % Final    MCV 07/19/2018 90  82 - 98 fL Final    MCH 07/19/2018 29 4  26 8 - 34 3 pg Final    MCHC 07/19/2018 32 6  31 4 - 37 4 g/dL Final    RDW 07/19/2018 13 0  11 6 - 15 1 % Final    MPV 07/19/2018 11 1  8 9 - 12 7 fL Final    Platelets 78/66/9866 224  149 - 390 Thousands/uL Final    nRBC 07/19/2018 0  /100 WBCs Final    Neutrophils Relative 07/19/2018 92* 43 - 75 % Final    Lymphocytes Relative 07/19/2018 5* 14 - 44 % Final    Monocytes Relative 07/19/2018 2* 4 - 12 % Final    Eosinophils Relative 07/19/2018 0  0 - 6 % Final    Basophils Relative 07/19/2018 0  0 - 1 % Final    Neutrophils Absolute 07/19/2018 13 24* 1 85 - 7 62 Thousands/µL Final    Lymphocytes Absolute 07/19/2018 0 69  0 60 - 4 47 Thousands/µL Final    Monocytes Absolute 07/19/2018 0 35  0 17 - 1 22 Thousand/µL Final    Eosinophils Absolute 07/19/2018 0 05  0 00 - 0 61 Thousand/µL Final    Basophils Absolute 07/19/2018 0 01  0 00 - 0 10 Thousands/µL Final    Sodium 07/19/2018 137  136 - 145 mmol/L Final    Potassium 07/19/2018 4 0  3 5 - 5 3 mmol/L Final    Chloride 07/19/2018 100  100 - 108 mmol/L Final    CO2 07/19/2018 29  21 - 32 mmol/L Final    ANION GAP 07/19/2018 8  4 - 13 mmol/L Final    BUN 07/19/2018 14  5 - 25 mg/dL Final    Creatinine 07/19/2018 0 89  0 60 - 1 30 mg/dL Final    Glucose 07/19/2018 179* 65 - 140 mg/dL Final    Calcium 07/19/2018 9 2  8 3 - 10 1 mg/dL Final    AST 07/19/2018 21  5 - 45 U/L Final    ALT 07/19/2018 30  12 - 78 U/L Final    Alkaline Phosphatase 07/19/2018 52  46 - 116 U/L Final    Total Protein 07/19/2018 7 5  6 4 - 8 2 g/dL Final    Albumin 07/19/2018 4 3  3 5 - 5 0 g/dL Final    Total Bilirubin 07/19/2018 0 34  0 20 - 1 00 mg/dL Final    eGFR 07/19/2018 67  ml/min/1 73sq m Final    Lipase 07/19/2018 138  73 - 393 u/L Final    LACTIC ACID 07/19/2018 3 0* 0 5 - 2 0 mmol/L Final    LACTIC ACID 07/19/2018 3 2* 0 5 - 2 0 mmol/L Final    Color, UA 07/19/2018 Yellow   Final    Clarity, UA 07/19/2018 Clear   Final    pH, UA 07/19/2018 5 5  4 5 - 8 0 Final    Leukocytes, UA 07/19/2018 Negative  Negative Final    Nitrite, UA 07/19/2018 Negative  Negative Final    Protein, UA 07/19/2018 Negative  Negative mg/dl Final    Glucose, UA 07/19/2018 Negative  Negative mg/dl Final    Ketones, UA 07/19/2018 Trace* Negative mg/dl Final    Urobilinogen, UA 07/19/2018 0 2  0 2, 1 0 E U /dl E U /dl Final    Bilirubin, UA 07/19/2018 Negative  Negative Final    Blood, UA 07/19/2018 Negative  Negative Final    Specific Gravity, UA 07/19/2018 1 020  1 003 - 1 030 Final   Orders Only on 07/18/2018   Component Date Value Ref Range Status    Color, UA 07/18/2018 Yellow   Final    Clarity, UA 07/18/2018 Clear   Final    Specific Gravity, UA 07/18/2018 1 016  1 003 - 1 030 Final    pH, UA 07/18/2018 6 0  4 5 - 8 0 Final    Leukocytes, UA 07/18/2018 Negative  Negative Final    Nitrite, UA 07/18/2018 Negative  Negative Final    Protein, UA 07/18/2018 Negative  Negative mg/dl Final    Glucose, UA 07/18/2018 500 (1/2%)* Negative mg/dl Final    Ketones, UA 07/18/2018 Negative  Negative mg/dl Final    Urobilinogen, UA 07/18/2018 0 2  0 2, 1 0 E U /dl E U /dl Final    Bilirubin, UA 07/18/2018 Negative  Negative Final    Blood, UA 07/18/2018 Negative  Negative Final   Office Visit on 07/08/2018   Component Date Value Ref Range Status    Urine Culture 07/08/2018 >100,000 cfu/ml Gamma Hemolytic Streptococcus NOT Enterococcus   Final    Urine Culture 07/08/2018 10,000-19,000 cfu/ml    Final           Current Medications     Current Outpatient Prescriptions:     insulin glargine (BASAGLAR KWIKPEN) 100 units/mL injection pen, Inject 30 Units under the skin daily at bedtime, Disp: 5 pen, Rfl: 0    lisinopril-hydrochlorothiazide (PRINZIDE,ZESTORETIC) 20-12 5 MG per tablet, Take 1 tablet by mouth daily, Disp: 30 tablet, Rfl: 0    metFORMIN (GLUCOPHAGE) 500 mg tablet, Take 2 tablets (1,000 mg total) by mouth 2 (two) times a day, Disp: 120 tablet, Rfl: 0    sitaGLIPtin (JANUVIA) 100 mg tablet, Take 1 tablet (100 mg total) by mouth daily, Disp: 30 tablet, Rfl: 0    rosuvastatin (CRESTOR) 10 MG tablet, Take 1 tablet (10 mg total) by mouth daily, Disp: 90 tablet, Rfl: 3      Ambar Morgan MD

## 2018-09-07 ENCOUNTER — HOSPITAL ENCOUNTER (OUTPATIENT)
Dept: MAMMOGRAPHY | Facility: CLINIC | Age: 68
Discharge: HOME | End: 2018-09-07
Payer: MEDICARE

## 2018-09-07 DIAGNOSIS — Z12.31 SCREENING MAMMOGRAM, ENCOUNTER FOR: ICD-10-CM

## 2018-09-07 PROCEDURE — 77067 SCR MAMMO BI INCL CAD: CPT

## 2018-10-15 DIAGNOSIS — I10 ESSENTIAL HYPERTENSION: ICD-10-CM

## 2018-10-15 RX ORDER — LISINOPRIL AND HYDROCHLOROTHIAZIDE 20; 12.5 MG/1; MG/1
1 TABLET ORAL DAILY
Qty: 30 TABLET | Refills: 4 | Status: SHIPPED | OUTPATIENT
Start: 2018-10-15 | End: 2019-03-19 | Stop reason: SDUPTHER

## 2018-10-19 ENCOUNTER — OFFICE VISIT (OUTPATIENT)
Dept: URGENT CARE | Age: 68
End: 2018-10-19
Payer: MEDICARE

## 2018-10-19 VITALS
OXYGEN SATURATION: 96 % | TEMPERATURE: 98.7 F | WEIGHT: 155 LBS | SYSTOLIC BLOOD PRESSURE: 140 MMHG | HEART RATE: 94 BPM | DIASTOLIC BLOOD PRESSURE: 68 MMHG | BODY MASS INDEX: 29.27 KG/M2 | HEIGHT: 61 IN | RESPIRATION RATE: 16 BRPM

## 2018-10-19 DIAGNOSIS — J02.9 SORE THROAT: ICD-10-CM

## 2018-10-19 DIAGNOSIS — J02.9 ACUTE VIRAL PHARYNGITIS: Primary | ICD-10-CM

## 2018-10-19 LAB — S PYO AG THROAT QL: NEGATIVE

## 2018-10-19 PROCEDURE — 87070 CULTURE OTHR SPECIMN AEROBIC: CPT | Performed by: PHYSICIAN ASSISTANT

## 2018-10-19 PROCEDURE — 99213 OFFICE O/P EST LOW 20 MIN: CPT | Performed by: PHYSICIAN ASSISTANT

## 2018-10-19 PROCEDURE — G0463 HOSPITAL OUTPT CLINIC VISIT: HCPCS | Performed by: PHYSICIAN ASSISTANT

## 2018-10-19 NOTE — PROGRESS NOTES
[facility]        NAME: Bentley Karimi is a 80 y o  female  : 1950    MRN: [id number]  DATE: 2018  TIME: 2:34 PM    Assessment and Plan   Acute viral pharyngitis [J02 8, B97 89]  1  Acute viral pharyngitis     2  Sore throat  lidocaine viscous (XYLOCAINE) 2 % mucosal solution    Throat culture    POCT rapid strepA         Patient Instructions     Take lidocaine swishes as prescribed  Fluids and rest  Throat Coat Tea  Wash hands frequently  Don't share drinks  Tylenol/Ibuprofen for pain/fever  Follow up with PCP in 3-5 days  Proceed to  ER if symptoms worsen  Chief Complaint     Chief Complaint   Patient presents with    Sore Throat     Pt c/o sore throat, trouble swallowing, and fever for the past 3 days  History of Present Illness       Sore Throat    This is a new problem  Episode onset: Wed  The problem has been gradually worsening  The maximum temperature recorded prior to her arrival was 101 - 101 9 F  The pain is at a severity of 8/10  The pain is moderate  Associated symptoms include trouble swallowing  Pertinent negatives include no abdominal pain, congestion, coughing, diarrhea, drooling, ear discharge, ear pain, headaches, hoarse voice, plugged ear sensation, neck pain, shortness of breath, stridor, swollen glands or vomiting  She has had no exposure to strep or mono  She has tried acetaminophen and gargles for the symptoms  The treatment provided mild relief  Review of Systems   Review of Systems   Constitutional: Positive for fever  Negative for activity change, appetite change and chills  HENT: Positive for sore throat and trouble swallowing  Negative for congestion, dental problem, drooling, ear discharge, ear pain, facial swelling, hoarse voice, postnasal drip, rhinorrhea, sinus pain, sinus pressure and sneezing  Eyes: Negative for itching  Respiratory: Negative for cough, shortness of breath and stridor      Gastrointestinal: Negative for abdominal pain, constipation, diarrhea, nausea and vomiting  Musculoskeletal: Negative for myalgias and neck pain  Skin: Negative for rash  Neurological: Negative for dizziness, light-headedness and headaches           Current Medications       Current Outpatient Prescriptions:     insulin glargine (BASAGLAR KWIKPEN) 100 units/mL injection pen, Inject 30 Units under the skin daily at bedtime, Disp: 5 pen, Rfl: 0    Insulin Pen Needle (EASY TOUCH PEN NEEDLES) 31G X 8 MM MISC, by Does not apply route daily, Disp: 100 each, Rfl: 4    lidocaine viscous (XYLOCAINE) 2 % mucosal solution, Swish and spit 15 mL 4 (four) times a day as needed for mild pain, Disp: 100 mL, Rfl: 0    lisinopril-hydrochlorothiazide (PRINZIDE,ZESTORETIC) 20-12 5 MG per tablet, Take 1 tablet by mouth daily, Disp: 30 tablet, Rfl: 4    metFORMIN (GLUCOPHAGE) 500 mg tablet, Take 2 tablets (1,000 mg total) by mouth 2 (two) times a day, Disp: 120 tablet, Rfl: 0    rosuvastatin (CRESTOR) 10 MG tablet, Take 1 tablet (10 mg total) by mouth daily, Disp: 90 tablet, Rfl: 3    sitaGLIPtin (JANUVIA) 100 mg tablet, Take 1 tablet (100 mg total) by mouth daily, Disp: 30 tablet, Rfl: 0    Current Allergies     Allergies as of 10/19/2018 - Reviewed 10/19/2018   Allergen Reaction Noted    Pioglitazone  06/25/2012    Wound dressings Rash             The following portions of the patient's history were reviewed and updated as appropriate: allergies, current medications, past family history, past medical history, past social history, past surgical history and problem list      Past Medical History:   Diagnosis Date    Diabetes mellitus ([address] )     Hyperlipidemia     Hypertension        Past Surgical History:   Procedure Laterality Date    CARPAL TUNNEL RELEASE      DILATION AND CURETTAGE OF UTERUS      HYSTERECTOMY      Total Abdominal       Family History   Problem Relation Age of Onset    Diabetes Father     Heart attack Father     Uterine cancer Maternal Grandmother          Medications have been verified  Objective   /68   Pulse 94   Temp 98 7 °F (37 1 °C)   Resp 16   Ht 5' 1" (1 549 m)   Wt 70 3 kg (155 lb)   SpO2 96%   BMI 29 29 kg/m²        Physical Exam     Physical Exam   Constitutional: She is oriented to person, place, and time  She appears well-developed and well-nourished  No distress  HENT:   Head: Normocephalic and atraumatic  Right Ear: External ear normal    Left Ear: External ear normal    Mouth/Throat: Oropharynx is clear and moist  No oropharyngeal exudate  Posterior pharynx erythematous without tonsillar hypertrophy or exudate  Cardiovascular: Normal rate, regular rhythm and normal heart sounds  Exam reveals no gallop and no friction rub  No murmur heard  Pulmonary/Chest: Effort normal  No respiratory distress  She has no wheezes  She has no rales  She exhibits no tenderness  Lymphadenopathy:     She has no cervical adenopathy  Neurological: She is alert and oriented to person, place, and time  Skin: Skin is warm and dry  No rash noted  Psychiatric: She has a normal mood and affect   Her behavior is normal  Judgment and thought content normal

## 2018-10-19 NOTE — PATIENT INSTRUCTIONS
Take lidocaine swishes as prescribed  Fluids and rest  Throat Coat Tea  Wash hands frequently  Don't share drinks  Tylenol/Ibuprofen for pain/fever  Follow up with PCP in 3-5 days  Proceed to  ER if symptoms worsen  Pharyngitis   WHAT YOU NEED TO KNOW:   Pharyngitis, or sore throat, is inflammation of the tissues and structures in your pharynx (throat)  Pharyngitis is most often caused by bacteria  It may also be caused by a cold or flu virus  Other causes include smoking, allergies, or acid reflux  DISCHARGE INSTRUCTIONS:   Call 911 for any of the following:   · You have trouble breathing or swallowing because your throat is swollen or sore  Return to the emergency department if:   · You are drooling because it hurts too much to swallow  · Your fever is higher than 102? F (39?C) or lasts longer than 3 days  · You are confused  · You taste blood in your throat  Contact your healthcare provider if:   · Your throat pain gets worse  · You have a painful lump in your throat that does not go away after 5 days  · Your symptoms do not improve after 5 days  · You have questions or concerns about your condition or care  Medicines:  Viral pharyngitis will go away on its own without treatment  Your sore throat should start to feel better in 3 to 5 days for both viral and bacterial infections  You may need any of the following:  · Antibiotics  treat a bacterial infection  · NSAIDs , such as ibuprofen, help decrease swelling, pain, and fever  NSAIDs can cause stomach bleeding or kidney problems in certain people  If you take blood thinner medicine, always ask your healthcare provider if NSAIDs are safe for you  Always read the medicine label and follow directions  · Acetaminophen  decreases pain and fever  It is available without a doctor's order  Ask how much to take and how often to take it  Follow directions  Acetaminophen can cause liver damage if not taken correctly      · Take your medicine as directed  Contact your healthcare provider if you think your medicine is not helping or if you have side effects  Tell him or her if you are allergic to any medicine  Keep a list of the medicines, vitamins, and herbs you take  Include the amounts, and when and why you take them  Bring the list or the pill bottles to follow-up visits  Carry your medicine list with you in case of an emergency  Manage your symptoms:   · Gargle salt water  Mix ¼ teaspoon salt in an 8 ounce glass of warm water and gargle  This may help decrease swelling in your throat  · Drink liquids as directed  You may need to drink more liquids than usual  Liquids may help soothe your throat and prevent dehydration  Ask how much liquid to drink each day and which liquids are best for you  · Use a cool-steam humidifier  to help moisten the air in your room and calm your cough  · Soothe your throat  with cough drops, ice, soft foods, or popsicles  Prevent the spread of pharyngitis:  Cover your mouth and nose when you cough or sneeze  Do not share food or drinks  Wash your hands often  Use soap and water  If soap and water are unavailable, use an alcohol based hand   Follow up with your healthcare provider as directed:  Write down your questions so you remember to ask them during your visits  © 2017 [employer] Information is for End User's use only and may not be sold, redistributed or otherwise used for commercial purposes  All illustrations and images included in CareNotes® are the copyrighted property of [employer]  or [employer]  The above information is an  only  It is not intended as medical advice for individual conditions or treatments  Talk to your doctor, nurse or pharmacist before following any medical regimen to see if it is safe and effective for you

## 2018-10-21 LAB — BACTERIA THROAT CULT: NORMAL

## 2018-10-28 ENCOUNTER — OFFICE VISIT (OUTPATIENT)
Dept: URGENT CARE | Age: 68
End: 2018-10-28
Payer: MEDICARE

## 2018-10-28 VITALS
HEART RATE: 87 BPM | TEMPERATURE: 98.9 F | RESPIRATION RATE: 16 BRPM | HEIGHT: 61 IN | DIASTOLIC BLOOD PRESSURE: 68 MMHG | BODY MASS INDEX: 28.7 KG/M2 | WEIGHT: 152 LBS | SYSTOLIC BLOOD PRESSURE: 125 MMHG | OXYGEN SATURATION: 99 %

## 2018-10-28 DIAGNOSIS — J20.9 ACUTE BRONCHITIS, UNSPECIFIED ORGANISM: Primary | ICD-10-CM

## 2018-10-28 PROCEDURE — 99213 OFFICE O/P EST LOW 20 MIN: CPT | Performed by: PHYSICIAN ASSISTANT

## 2018-10-28 PROCEDURE — G0463 HOSPITAL OUTPT CLINIC VISIT: HCPCS | Performed by: PHYSICIAN ASSISTANT

## 2018-10-28 RX ORDER — GUAIFENESIN 600 MG
1200 TABLET, EXTENDED RELEASE 12 HR ORAL EVERY 12 HOURS SCHEDULED
Qty: 20 TABLET | Refills: 0 | Status: SHIPPED | OUTPATIENT
Start: 2018-10-28 | End: 2019-03-26 | Stop reason: ALTCHOICE

## 2018-10-28 RX ORDER — BROMPHENIRAMINE MALEATE, PSEUDOEPHEDRINE HYDROCHLORIDE, AND DEXTROMETHORPHAN HYDROBROMIDE 2; 30; 10 MG/5ML; MG/5ML; MG/5ML
5 SYRUP ORAL 4 TIMES DAILY PRN
Qty: 120 ML | Refills: 0 | Status: SHIPPED | OUTPATIENT
Start: 2018-10-28 | End: 2018-12-26 | Stop reason: ALTCHOICE

## 2018-10-28 NOTE — PROGRESS NOTES
[facility] [employer] [facility]        NAME: Belén Paiz is a 80 y o  female  : 1950    MRN: [id number]  DATE: 2018  TIME: 10:53 AM    Assessment and Plan   Acute bronchitis, unspecified organism [J20 9]  1  Acute bronchitis, unspecified organism  brompheniramine-pseudoephedrine-DM 30-2-10 MG/5ML syrup    guaiFENesin (MUCINEX) 600 mg 12 hr tablet         Patient Instructions     Take Bromfed DM and mucinex as prescribed  Fluids and rest (Warm water with honey and lemon)  Tylenol/Ibuprofen for pain fever  Follow up with PCP in 3-5 days  Proceed to  ER if symptoms worsen  Chief Complaint     Chief Complaint   Patient presents with    Cough     productive cough; 2 weeks; fevers         History of Present Illness       Cough   This is a new problem  The current episode started 1 to 4 weeks ago  The problem has been unchanged  The problem occurs every few minutes  The cough is non-productive  Associated symptoms include rhinorrhea  Pertinent negatives include no chest pain, chills, ear congestion, ear pain, fever, headaches, heartburn, hemoptysis, myalgias, nasal congestion, postnasal drip, rash, sore throat, shortness of breath, sweats, weight loss or wheezing  She has tried OTC cough suppressant for the symptoms  The treatment provided mild relief  Her past medical history is significant for bronchitis  There is no history of asthma or pneumonia  Review of Systems   Review of Systems   Constitutional: Positive for fatigue  Negative for activity change, appetite change, chills, fever and weight loss  HENT: Positive for rhinorrhea  Negative for congestion, ear discharge, ear pain, postnasal drip, sinus pain, sinus pressure, sneezing, sore throat and trouble swallowing  Respiratory: Positive for cough  Negative for hemoptysis, chest tightness, shortness of breath and wheezing  Cardiovascular: Negative for chest pain     Gastrointestinal: Negative for abdominal pain, diarrhea, heartburn, nausea and vomiting  Musculoskeletal: Negative for myalgias  Skin: Negative for rash  Neurological: Negative for light-headedness and headaches           Current Medications       Current Outpatient Prescriptions:     brompheniramine-pseudoephedrine-DM 30-2-10 MG/5ML syrup, Take 5 mL by mouth 4 (four) times a day as needed for cough, Disp: 120 mL, Rfl: 0    guaiFENesin (MUCINEX) 600 mg 12 hr tablet, Take 2 tablets (1,200 mg total) by mouth every 12 (twelve) hours, Disp: 20 tablet, Rfl: 0    insulin glargine (BASAGLAR KWIKPEN) 100 units/mL injection pen, Inject 30 Units under the skin daily at bedtime, Disp: 5 pen, Rfl: 0    Insulin Pen Needle (EASY TOUCH PEN NEEDLES) 31G X 8 MM MISC, by Does not apply route daily, Disp: 100 each, Rfl: 4    lidocaine viscous (XYLOCAINE) 2 % mucosal solution, Swish and spit 15 mL 4 (four) times a day as needed for mild pain, Disp: 100 mL, Rfl: 0    lisinopril-hydrochlorothiazide (PRINZIDE,ZESTORETIC) 20-12 5 MG per tablet, Take 1 tablet by mouth daily, Disp: 30 tablet, Rfl: 4    metFORMIN (GLUCOPHAGE) 500 mg tablet, Take 2 tablets (1,000 mg total) by mouth 2 (two) times a day, Disp: 120 tablet, Rfl: 0    rosuvastatin (CRESTOR) 10 MG tablet, Take 1 tablet (10 mg total) by mouth daily, Disp: 90 tablet, Rfl: 3    sitaGLIPtin (JANUVIA) 100 mg tablet, Take 1 tablet (100 mg total) by mouth daily, Disp: 30 tablet, Rfl: 0    Current Allergies     Allergies as of 10/28/2018 - Reviewed 10/28/2018   Allergen Reaction Noted    Pioglitazone  06/25/2012    Wound dressings Rash             The following portions of the patient's history were reviewed and updated as appropriate: allergies, current medications, past family history, past medical history, past social history, past surgical history and problem list      Past Medical History:   Diagnosis Date    Diabetes mellitus ([address] )     Hyperlipidemia     Hypertension        Past Surgical History:   Procedure Laterality Date    CARPAL TUNNEL RELEASE      DILATION AND CURETTAGE OF UTERUS      HYSTERECTOMY      Total Abdominal       Family History   Problem Relation Age of Onset    Diabetes Father     Heart attack Father     Uterine cancer Maternal Grandmother          Medications have been verified  Objective   /68   Pulse 87   Temp 98 9 °F (37 2 °C)   Resp 16   Ht 5' 1" (1 549 m)   Wt 68 9 kg (152 lb)   SpO2 99%   BMI 28 72 kg/m²        Physical Exam     Physical Exam   Constitutional: She is oriented to person, place, and time  She appears well-developed and well-nourished  No distress  HENT:   Head: Normocephalic  Right Ear: External ear normal    Left Ear: External ear normal    Nose: Nose normal    Mouth/Throat: Oropharynx is clear and moist  No oropharyngeal exudate  Eyes: Conjunctivae are normal    Cardiovascular: Normal rate, regular rhythm, normal heart sounds and intact distal pulses  Exam reveals no gallop and no friction rub  No murmur heard  Pulmonary/Chest: Effort normal and breath sounds normal  No respiratory distress  She has no wheezes  She has no rales  She exhibits no tenderness  Lymphadenopathy:     She has no cervical adenopathy  Neurological: She is alert and oriented to person, place, and time  Skin: Skin is warm  She is not diaphoretic  Psychiatric: She has a normal mood and affect  Her behavior is normal  Judgment and thought content normal    Vitals reviewed

## 2018-10-28 NOTE — PATIENT INSTRUCTIONS
Take Bromfed DM and mucinex as prescribed  Fluids and rest (Warm water with honey and lemon)  Tylenol/Ibuprofen for pain fever  Follow up with PCP in 3-5 days  Proceed to  ER if symptoms worsen  Acute Bronchitis   WHAT YOU NEED TO KNOW:   Acute bronchitis is swelling and irritation in the air passages of your lungs  This irritation may cause you to cough or have other breathing problems  Acute bronchitis often starts because of another illness, such as a cold or the flu  The illness spreads from your nose and throat to your windpipe and airways  Bronchitis is often called a chest cold  Acute bronchitis lasts about 3 to 6 weeks and is usually not a serious illness  Your cough can last for several weeks  DISCHARGE INSTRUCTIONS:   Return to the emergency department if:   · You cough up blood  · Your lips or fingernails turn blue  · You feel like you are not getting enough air when you breathe  Contact your healthcare provider if:   · You have a fever  · Your breathing problems do not go away or get worse  · Your cough does not get better within 4 weeks  · You have questions or concerns about your condition or care  Self-care:   · Get more rest   Rest helps your body to heal  Slowly start to do more each day  Rest when you feel it is needed  · Avoid irritants in the air  Avoid chemicals, fumes, and dust  Wear a face mask if you must work around dust or fumes  Stay inside on days when air pollution levels are high  If you have allergies, stay inside when pollen counts are high  Do not use aerosol products, such as spray-on deodorant, bug spray, and hair spray  · Do not smoke or be around others who smoke  Nicotine and other chemicals in cigarettes and cigars damages the cilia that move mucus out of your lungs  Ask your healthcare provider for information if you currently smoke and need help to quit  E-cigarettes or smokeless tobacco still contain nicotine   Talk to your healthcare provider before you use these products  · Drink liquids as directed  Liquids help keep your air passages moist and help you cough up mucus  You may need to drink more liquids when you have acute bronchitis  Ask how much liquid to drink each day and which liquids are best for you  · Use a humidifier or vaporizer  Use a cool mist humidifier or a vaporizer to increase air moisture in your home  This may make it easier for you to breathe and help decrease your cough  Decrease risk for acute bronchitis:   · Get the vaccinations you need  Ask your healthcare provider if you should get vaccinated against the flu or pneumonia  · Prevent the spread of germs  You can decrease your risk of acute bronchitis and other illnesses by doing the following:     [facility] your hands often with soap and water  Carry germ-killing hand lotion or gel with you  You can use the lotion or gel to clean your hands when soap and water are not available  ¨ Do not touch your eyes, nose, or mouth unless you have washed your hands first     ¨ Always cover your mouth when you cough to prevent the spread of germs  It is best to cough into a tissue or your shirt sleeve instead of into your hand  Ask those around you cover their mouths when they cough  ¨ Try to avoid people who have a cold or the flu  If you are sick, stay away from others as much as possible  Medicines: Your healthcare provider may  give you any of the following:  · Ibuprofen or acetaminophen  are medicines that help lower your fever  They are available without a doctor's order  Ask your healthcare provider which medicine is right for you  Ask how much to take and how often to take it  Follow directions  These medicines can cause stomach bleeding if not taken correctly  Ibuprofen can cause kidney damage  Do not take ibuprofen if you have kidney disease, an ulcer, or allergies to aspirin  Acetaminophen can cause liver damage   Do not take more than 4,000 milligrams in 24 hours      · Decongestants  help loosen mucus in your lungs and make it easier to cough up  This can help you breathe easier  · Cough suppressants  decrease your urge to cough  If your cough produces mucus, do not take a cough suppressant unless your healthcare provider tells you to  Your healthcare provider may suggest that you take a cough suppressant at night so you can rest     · Inhalers  may be given  Your healthcare provider may give you one or more inhalers to help you breathe easier and cough less  An inhaler gives your medicine to open your airways  Ask your healthcare provider to show you how to use your inhaler correctly  · Take your medicine as directed  Contact your healthcare provider if you think your medicine is not helping or if you have side effects  Tell him of her if you are allergic to any medicine  Keep a list of the medicines, vitamins, and herbs you take  Include the amounts, and when and why you take them  Bring the list or the pill bottles to follow-up visits  Carry your medicine list with you in case of an emergency  Follow up with your healthcare provider as directed:  Write down questions you have so you will remember to ask them during your follow-up visits  © 2017 [employer] Information is for End User's use only and may not be sold, redistributed or otherwise used for commercial purposes  All illustrations and images included in CareNotes® are the copyrighted property of [employer]  or [employer]  The above information is an  only  It is not intended as medical advice for individual conditions or treatments  Talk to your doctor, nurse or pharmacist before following any medical regimen to see if it is safe and effective for you

## 2018-11-02 ENCOUNTER — APPOINTMENT (OUTPATIENT)
Dept: RADIOLOGY | Facility: MEDICAL CENTER | Age: 68
End: 2018-11-02
Payer: MEDICARE

## 2018-11-02 ENCOUNTER — APPOINTMENT (OUTPATIENT)
Dept: LAB | Facility: MEDICAL CENTER | Age: 68
End: 2018-11-02
Payer: MEDICARE

## 2018-11-02 ENCOUNTER — OFFICE VISIT (OUTPATIENT)
Dept: INTERNAL MEDICINE CLINIC | Facility: CLINIC | Age: 68
End: 2018-11-02
Payer: MEDICARE

## 2018-11-02 ENCOUNTER — TELEPHONE (OUTPATIENT)
Dept: INTERNAL MEDICINE CLINIC | Facility: CLINIC | Age: 68
End: 2018-11-02

## 2018-11-02 VITALS
HEART RATE: 80 BPM | RESPIRATION RATE: 15 BRPM | SYSTOLIC BLOOD PRESSURE: 129 MMHG | HEIGHT: 61 IN | TEMPERATURE: 97.6 F | DIASTOLIC BLOOD PRESSURE: 71 MMHG | BODY MASS INDEX: 28.43 KG/M2 | WEIGHT: 150.6 LBS

## 2018-11-02 DIAGNOSIS — I10 ESSENTIAL HYPERTENSION: ICD-10-CM

## 2018-11-02 DIAGNOSIS — Z79.4 TYPE 2 DIABETES MELLITUS WITH HYPERGLYCEMIA, WITH LONG-TERM CURRENT USE OF INSULIN (HCC): ICD-10-CM

## 2018-11-02 DIAGNOSIS — J40 BRONCHITIS: ICD-10-CM

## 2018-11-02 DIAGNOSIS — E11.65 TYPE 2 DIABETES MELLITUS WITH HYPERGLYCEMIA, WITH LONG-TERM CURRENT USE OF INSULIN (HCC): Primary | ICD-10-CM

## 2018-11-02 DIAGNOSIS — E11.65 TYPE 2 DIABETES MELLITUS WITH HYPERGLYCEMIA, WITH LONG-TERM CURRENT USE OF INSULIN (HCC): ICD-10-CM

## 2018-11-02 DIAGNOSIS — Z79.4 TYPE 2 DIABETES MELLITUS WITH HYPERGLYCEMIA, WITH LONG-TERM CURRENT USE OF INSULIN (HCC): Primary | ICD-10-CM

## 2018-11-02 LAB
ALBUMIN SERPL BCP-MCNC: 3.7 G/DL (ref 3.5–5)
ALP SERPL-CCNC: 56 U/L (ref 46–116)
ALT SERPL W P-5'-P-CCNC: 19 U/L (ref 12–78)
ANION GAP SERPL CALCULATED.3IONS-SCNC: 9 MMOL/L (ref 4–13)
AST SERPL W P-5'-P-CCNC: 12 U/L (ref 5–45)
BASOPHILS # BLD AUTO: 0.03 THOUSANDS/ΜL (ref 0–0.1)
BASOPHILS NFR BLD AUTO: 0 % (ref 0–1)
BILIRUB SERPL-MCNC: 0.24 MG/DL (ref 0.2–1)
BUN SERPL-MCNC: 17 MG/DL (ref 5–25)
CALCIUM SERPL-MCNC: 9.4 MG/DL (ref 8.3–10.1)
CHLORIDE SERPL-SCNC: 104 MMOL/L (ref 100–108)
CO2 SERPL-SCNC: 25 MMOL/L (ref 21–32)
CREAT SERPL-MCNC: 0.69 MG/DL (ref 0.6–1.3)
EOSINOPHIL # BLD AUTO: 0.17 THOUSAND/ΜL (ref 0–0.61)
EOSINOPHIL NFR BLD AUTO: 1 % (ref 0–6)
ERYTHROCYTE [DISTWIDTH] IN BLOOD BY AUTOMATED COUNT: 12.7 % (ref 11.6–15.1)
GFR SERPL CREATININE-BSD FRML MDRD: 90 ML/MIN/1.73SQ M
GLUCOSE P FAST SERPL-MCNC: 95 MG/DL (ref 65–99)
HCT VFR BLD AUTO: 36.7 % (ref 34.8–46.1)
HGB BLD-MCNC: 12 G/DL (ref 11.5–15.4)
IMM GRANULOCYTES # BLD AUTO: 0.05 THOUSAND/UL (ref 0–0.2)
IMM GRANULOCYTES NFR BLD AUTO: 0 % (ref 0–2)
LYMPHOCYTES # BLD AUTO: 2.78 THOUSANDS/ΜL (ref 0.6–4.47)
LYMPHOCYTES NFR BLD AUTO: 23 % (ref 14–44)
MCH RBC QN AUTO: 28.6 PG (ref 26.8–34.3)
MCHC RBC AUTO-ENTMCNC: 32.7 G/DL (ref 31.4–37.4)
MCV RBC AUTO: 87 FL (ref 82–98)
MONOCYTES # BLD AUTO: 0.65 THOUSAND/ΜL (ref 0.17–1.22)
MONOCYTES NFR BLD AUTO: 5 % (ref 4–12)
NEUTROPHILS # BLD AUTO: 8.69 THOUSANDS/ΜL (ref 1.85–7.62)
NEUTS SEG NFR BLD AUTO: 71 % (ref 43–75)
NRBC BLD AUTO-RTO: 0 /100 WBCS
PLATELET # BLD AUTO: 374 THOUSANDS/UL (ref 149–390)
PMV BLD AUTO: 10.9 FL (ref 8.9–12.7)
POTASSIUM SERPL-SCNC: 3.8 MMOL/L (ref 3.5–5.3)
PROT SERPL-MCNC: 7.4 G/DL (ref 6.4–8.2)
RBC # BLD AUTO: 4.2 MILLION/UL (ref 3.81–5.12)
SODIUM SERPL-SCNC: 138 MMOL/L (ref 136–145)
TSH SERPL DL<=0.05 MIU/L-ACNC: 1.69 UIU/ML (ref 0.36–3.74)
WBC # BLD AUTO: 12.37 THOUSAND/UL (ref 4.31–10.16)

## 2018-11-02 PROCEDURE — 84443 ASSAY THYROID STIM HORMONE: CPT

## 2018-11-02 PROCEDURE — 36415 COLL VENOUS BLD VENIPUNCTURE: CPT

## 2018-11-02 PROCEDURE — 80053 COMPREHEN METABOLIC PANEL: CPT

## 2018-11-02 PROCEDURE — 85025 COMPLETE CBC W/AUTO DIFF WBC: CPT

## 2018-11-02 PROCEDURE — 99214 OFFICE O/P EST MOD 30 MIN: CPT | Performed by: INTERNAL MEDICINE

## 2018-11-02 PROCEDURE — 71046 X-RAY EXAM CHEST 2 VIEWS: CPT

## 2018-11-02 RX ORDER — AZITHROMYCIN 250 MG/1
TABLET, FILM COATED ORAL
Qty: 6 TABLET | Refills: 0 | Status: SHIPPED | OUTPATIENT
Start: 2018-11-02 | End: 2018-11-07

## 2018-11-02 NOTE — PATIENT INSTRUCTIONS
Keep yourself hydrated tight Tylenol for achiness and pain and fever  Start year Z-Karl as directed 2 tablets the 1st day and 1 for 4 days  Get her chest x-ray that you had been coughing for quite some time for completeness make sure there is no infiltrate or pneumonia  And get her laboratory studies

## 2018-11-02 NOTE — ASSESSMENT & PLAN NOTE
Lab Results   Component Value Date    HGBA1C 7 4 (H) 08/27/2018       No results for input(s): POCGLU in the last 72 hours    Well controlled for age and comorbidities    Blood Sugar Average: Last 72 hrs good control for her age and comorbidities no change in medications:

## 2018-11-02 NOTE — ASSESSMENT & PLAN NOTE
Blood pressure well control on a combination of lisinopril hydrochlorothiazide if the cough persists consider switching to an angiotensin receptor blocker

## 2018-11-02 NOTE — TELEPHONE ENCOUNTER
Message was left for Kathy Days to call our office  Phone call from [place] in radiology  Kathy Days has bronchopneumonia  As per Dr Arti Hale, she should  take the Z-Karl  If she developes fever, chest pain or SOB, go to the ER (not urgent care)  Call Monday with update     11 2 18 14:06  I spoke with [facility]  She already started taking the Z-Karl  She will call Monday with an update

## 2018-11-02 NOTE — ASSESSMENT & PLAN NOTE
Came in urgently because of a chronic cough she has phlegm but she swallows it she went to urgent care the did a strep screen which was negative cough persist and that she came in for further evaluation therein ischial diagnosis was viral now she is having loose stools for about a couple of days but she has been coughing for about 3 weeks no hemoptysis no night sweats  Came in for further evaluation  Her lungs some some rhonchi at the bases but no wheezing no rales the  Heart regular rhythm  She has no fever  Will do the following  Give her a Z-Karl  Plenty of fluid and Tylenol  If the cough persists she is on an ACE-inhibitor and specially the chest x-ray is negative which will order today with some laboratory studies

## 2018-11-13 LAB
LEFT EYE DIABETIC RETINOPATHY: NORMAL
RIGHT EYE DIABETIC RETINOPATHY: NORMAL
SEVERITY (EYE EXAM): NORMAL

## 2018-11-29 DIAGNOSIS — Z79.4 TYPE 2 DIABETES MELLITUS WITH COMPLICATION, WITH LONG-TERM CURRENT USE OF INSULIN (HCC): ICD-10-CM

## 2018-11-29 DIAGNOSIS — E11.8 TYPE 2 DIABETES MELLITUS WITH COMPLICATION, WITH LONG-TERM CURRENT USE OF INSULIN (HCC): ICD-10-CM

## 2018-11-29 DIAGNOSIS — E11.9 TYPE 2 DIABETES MELLITUS WITHOUT COMPLICATION, WITHOUT LONG-TERM CURRENT USE OF INSULIN (HCC): ICD-10-CM

## 2018-12-20 ENCOUNTER — OFFICE VISIT (OUTPATIENT)
Dept: INTERNAL MEDICINE CLINIC | Facility: CLINIC | Age: 68
End: 2018-12-20
Payer: MEDICARE

## 2018-12-20 ENCOUNTER — APPOINTMENT (OUTPATIENT)
Dept: LAB | Facility: MEDICAL CENTER | Age: 68
End: 2018-12-20
Payer: MEDICARE

## 2018-12-20 ENCOUNTER — APPOINTMENT (OUTPATIENT)
Dept: LAB | Facility: HOSPITAL | Age: 68
End: 2018-12-20
Payer: MEDICARE

## 2018-12-20 ENCOUNTER — HOSPITAL ENCOUNTER (OUTPATIENT)
Dept: RADIOLOGY | Facility: HOSPITAL | Age: 68
Discharge: HOME | End: 2018-12-20
Payer: MEDICARE

## 2018-12-20 VITALS
DIASTOLIC BLOOD PRESSURE: 78 MMHG | BODY MASS INDEX: 28.51 KG/M2 | TEMPERATURE: 98.3 F | RESPIRATION RATE: 15 BRPM | WEIGHT: 151 LBS | HEIGHT: 61 IN | HEART RATE: 80 BPM | SYSTOLIC BLOOD PRESSURE: 124 MMHG

## 2018-12-20 DIAGNOSIS — Z79.4 TYPE 2 DIABETES MELLITUS WITH HYPERGLYCEMIA, WITH LONG-TERM CURRENT USE OF INSULIN (HCC): ICD-10-CM

## 2018-12-20 DIAGNOSIS — J20.9 ACUTE BRONCHITIS, UNSPECIFIED ORGANISM: Primary | ICD-10-CM

## 2018-12-20 DIAGNOSIS — J20.9 ACUTE BRONCHITIS, UNSPECIFIED ORGANISM: ICD-10-CM

## 2018-12-20 DIAGNOSIS — E11.65 TYPE 2 DIABETES MELLITUS WITH HYPERGLYCEMIA, WITH LONG-TERM CURRENT USE OF INSULIN (HCC): ICD-10-CM

## 2018-12-20 DIAGNOSIS — I10 ESSENTIAL HYPERTENSION: ICD-10-CM

## 2018-12-20 DIAGNOSIS — E78.00 HYPERCHOLESTEROLEMIA: ICD-10-CM

## 2018-12-20 LAB
ALBUMIN SERPL BCP-MCNC: 4 G/DL (ref 3.5–5)
ALP SERPL-CCNC: 55 U/L (ref 46–116)
ALT SERPL W P-5'-P-CCNC: 23 U/L (ref 12–78)
ANION GAP SERPL CALCULATED.3IONS-SCNC: 11 MMOL/L (ref 4–13)
AST SERPL W P-5'-P-CCNC: 15 U/L (ref 5–45)
BASOPHILS # BLD AUTO: 0.04 THOUSANDS/ΜL (ref 0–0.1)
BASOPHILS NFR BLD AUTO: 0 % (ref 0–1)
BILIRUB SERPL-MCNC: 0.44 MG/DL (ref 0.2–1)
BUN SERPL-MCNC: 12 MG/DL (ref 5–25)
CALCIUM SERPL-MCNC: 9.3 MG/DL (ref 8.3–10.1)
CHLORIDE SERPL-SCNC: 101 MMOL/L (ref 100–108)
CHOLEST SERPL-MCNC: 129 MG/DL (ref 50–200)
CO2 SERPL-SCNC: 27 MMOL/L (ref 21–32)
CREAT SERPL-MCNC: 0.76 MG/DL (ref 0.6–1.3)
EOSINOPHIL # BLD AUTO: 0.24 THOUSAND/ΜL (ref 0–0.61)
EOSINOPHIL NFR BLD AUTO: 2 % (ref 0–6)
ERYTHROCYTE [DISTWIDTH] IN BLOOD BY AUTOMATED COUNT: 12.7 % (ref 11.6–15.1)
EST. AVERAGE GLUCOSE BLD GHB EST-MCNC: 197 MG/DL
FLUAV AG SPEC QL: NORMAL
FLUBV AG SPEC QL: NORMAL
GFR SERPL CREATININE-BSD FRML MDRD: 81 ML/MIN/1.73SQ M
GLUCOSE P FAST SERPL-MCNC: 117 MG/DL (ref 65–99)
HBA1C MFR BLD: 8.5 % (ref 4.2–6.3)
HCT VFR BLD AUTO: 37.7 % (ref 34.8–46.1)
HDLC SERPL-MCNC: 41 MG/DL (ref 40–60)
HGB BLD-MCNC: 12.2 G/DL (ref 11.5–15.4)
IMM GRANULOCYTES # BLD AUTO: 0.04 THOUSAND/UL (ref 0–0.2)
IMM GRANULOCYTES NFR BLD AUTO: 0 % (ref 0–2)
LDLC SERPL CALC-MCNC: 54 MG/DL (ref 0–100)
LYMPHOCYTES # BLD AUTO: 2.3 THOUSANDS/ΜL (ref 0.6–4.47)
LYMPHOCYTES NFR BLD AUTO: 19 % (ref 14–44)
MCH RBC QN AUTO: 29.1 PG (ref 26.8–34.3)
MCHC RBC AUTO-ENTMCNC: 32.4 G/DL (ref 31.4–37.4)
MCV RBC AUTO: 90 FL (ref 82–98)
MONOCYTES # BLD AUTO: 0.83 THOUSAND/ΜL (ref 0.17–1.22)
MONOCYTES NFR BLD AUTO: 7 % (ref 4–12)
NEUTROPHILS # BLD AUTO: 8.9 THOUSANDS/ΜL (ref 1.85–7.62)
NEUTS SEG NFR BLD AUTO: 72 % (ref 43–75)
NONHDLC SERPL-MCNC: 88 MG/DL
NRBC BLD AUTO-RTO: 0 /100 WBCS
PLATELET # BLD AUTO: 250 THOUSANDS/UL (ref 149–390)
PMV BLD AUTO: 10.5 FL (ref 8.9–12.7)
POTASSIUM SERPL-SCNC: 3.8 MMOL/L (ref 3.5–5.3)
PROCALCITONIN SERPL-MCNC: <0.05 NG/ML
PROT SERPL-MCNC: 7.4 G/DL (ref 6.4–8.2)
RBC # BLD AUTO: 4.19 MILLION/UL (ref 3.81–5.12)
RSV B RNA SPEC QL NAA+PROBE: NORMAL
SODIUM SERPL-SCNC: 139 MMOL/L (ref 136–145)
TRIGL SERPL-MCNC: 169 MG/DL
WBC # BLD AUTO: 12.35 THOUSAND/UL (ref 4.31–10.16)

## 2018-12-20 PROCEDURE — 80053 COMPREHEN METABOLIC PANEL: CPT | Performed by: INTERNAL MEDICINE

## 2018-12-20 PROCEDURE — 36415 COLL VENOUS BLD VENIPUNCTURE: CPT

## 2018-12-20 PROCEDURE — 71046 X-RAY EXAM CHEST 2 VIEWS: CPT

## 2018-12-20 PROCEDURE — 80061 LIPID PANEL: CPT

## 2018-12-20 PROCEDURE — 83036 HEMOGLOBIN GLYCOSYLATED A1C: CPT

## 2018-12-20 PROCEDURE — 99214 OFFICE O/P EST MOD 30 MIN: CPT | Performed by: INTERNAL MEDICINE

## 2018-12-20 PROCEDURE — 84145 PROCALCITONIN (PCT): CPT

## 2018-12-20 PROCEDURE — 87631 RESP VIRUS 3-5 TARGETS: CPT | Performed by: INTERNAL MEDICINE

## 2018-12-20 PROCEDURE — 85025 COMPLETE CBC W/AUTO DIFF WBC: CPT | Performed by: INTERNAL MEDICINE

## 2018-12-20 RX ORDER — FLUTICASONE PROPIONATE 50 MCG
1 SPRAY, SUSPENSION (ML) NASAL DAILY
Qty: 16 G | Refills: 0 | Status: SHIPPED | OUTPATIENT
Start: 2018-12-20 | End: 2019-03-26 | Stop reason: ALTCHOICE

## 2018-12-20 NOTE — ASSESSMENT & PLAN NOTE
Lab Results   Component Value Date    HGBA1C 8 5 (H) 12/20/2018       No results for input(s): POCGLU in the last 72 hours  Blood Sugar Average: Last 72 hrs:   seems overall well controlled

## 2018-12-20 NOTE — PROGRESS NOTES
Assessment/Plan:  Recurrent symptoms after recent treatment of community-acquired pneumonia  Possible recurrent viral infection  Will get CBC, procalcitonin an chest x-ray  Depending on the results further antibiotics will be prescribed  Otherwise symptomatic treatment with Flonase, Delsym for cough  If she has worsening chest tightness or wheezing, inhalers will be prescribed  Type 2 diabetes mellitus with hyperglycemia (HCC)  Lab Results   Component Value Date    HGBA1C 8 5 (H) 12/20/2018       No results for input(s): POCGLU in the last 72 hours  Blood Sugar Average: Last 72 hrs:   seems overall well controlled  Hypertension  Well controlled on the current regimen       Diagnoses and all orders for this visit:    Acute bronchitis, unspecified organism  -     CBC and differential  -     Comprehensive metabolic panel  -     Procalcitonin; Future  -     XR chest pa & lateral; Future  -     fluticasone (FLONASE) 50 mcg/act nasal spray; 1 spray into each nostril daily  -     Influenza A/B and RSV by PCR    Essential hypertension    Type 2 diabetes mellitus with hyperglycemia, with long-term current use of insulin (HCC)          Subjective:   Chief Complaint   Patient presents with    Cough     took Delsem cough medication, dizzy, chest hurts         Patient ID: Bridger Pulido is a 80 y o  female  She comes in for an acute appointment  She notes that in end of October she was diagnosed with acute bronchitis  She was seen in the office after that with continued symptoms and x-ray showed consolidation  She was prescribed a Z-Karl  Her symptoms started to improve and overall felt better  But for the last 1 week she feels sick again  She has worsening cough, subjective fatigue, achiness  No wheezing or shortness of breath  Blood pressures and blood sugars have been stable          The following portions of the patient's history were reviewed and updated as appropriate: current medications, past medical history, past social history and past surgical history  PHQ-9 Depression Screening    PHQ-9:    Frequency of the following problems over the past two weeks:                Current Outpatient Prescriptions:     insulin glargine (BASAGLAR KWIKPEN) 100 units/mL injection pen, Inject 30 Units under the skin daily at bedtime, Disp: 5 pen, Rfl: 4    lisinopril-hydrochlorothiazide (PRINZIDE,ZESTORETIC) 20-12 5 MG per tablet, Take 1 tablet by mouth daily, Disp: 30 tablet, Rfl: 4    metFORMIN (GLUCOPHAGE) 500 mg tablet, Take 2 tablets (1,000 mg total) by mouth 2 (two) times a day, Disp: 120 tablet, Rfl: 0    rosuvastatin (CRESTOR) 10 MG tablet, Take 1 tablet (10 mg total) by mouth daily, Disp: 90 tablet, Rfl: 3    sitaGLIPtin (JANUVIA) 100 mg tablet, Take 1 tablet (100 mg total) by mouth daily, Disp: 30 tablet, Rfl: 0    brompheniramine-pseudoephedrine-DM 30-2-10 MG/5ML syrup, Take 5 mL by mouth 4 (four) times a day as needed for cough (Patient not taking: Reported on 12/20/2018 ), Disp: 120 mL, Rfl: 0    fluticasone (FLONASE) 50 mcg/act nasal spray, 1 spray into each nostril daily, Disp: 16 g, Rfl: 0    guaiFENesin (MUCINEX) 600 mg 12 hr tablet, Take 2 tablets (1,200 mg total) by mouth every 12 (twelve) hours (Patient not taking: Reported on 12/20/2018 ), Disp: 20 tablet, Rfl: 0    Insulin Pen Needle (EASY TOUCH PEN NEEDLES) 31G X 8 MM MISC, by Does not apply route daily, Disp: 100 each, Rfl: 1    lidocaine viscous (XYLOCAINE) 2 % mucosal solution, Swish and spit 15 mL 4 (four) times a day as needed for mild pain, Disp: 100 mL, Rfl: 0    Review of Systems   Constitutional: Positive for fatigue  Negative for fever and unexpected weight change  HENT: Positive for sore throat  Negative for ear pain and hearing loss  Eyes: Negative for pain and discharge  Respiratory: Positive for cough  Negative for chest tightness and shortness of breath  Cardiovascular: Negative for chest pain and palpitations  Gastrointestinal: Negative for abdominal pain, blood in stool, constipation, diarrhea and nausea  Genitourinary: Negative for dysuria, frequency and hematuria  Musculoskeletal: Negative for arthralgias and joint swelling  Skin: Negative for rash  Allergic/Immunologic: Negative for immunocompromised state  Neurological: Negative for dizziness and headaches  Hematological: Negative for adenopathy  Psychiatric/Behavioral: Negative for confusion and sleep disturbance  Objective:  /78 (BP Location: Left arm, Patient Position: Sitting, Cuff Size: Standard)   Pulse 80   Temp 98 3 °F (36 8 °C) (Tympanic)   Resp 15   Ht 5' 1" (1 549 m)   Wt 68 5 kg (151 lb)   BMI 28 53 kg/m²      Physical Exam   Constitutional: She appears well-developed and well-nourished  HENT:   Head: Normocephalic and atraumatic  Right Ear: Tympanic membrane normal    Left Ear: Tympanic membrane normal    Nose: Nose normal    Mouth/Throat: Posterior oropharyngeal erythema present  No posterior oropharyngeal edema  Eyes: Pupils are equal, round, and reactive to light  Conjunctivae are normal  Right eye exhibits no discharge  Neck: Normal range of motion  Neck supple  No thyromegaly present  Cardiovascular: Normal rate, regular rhythm, S1 normal, S2 normal and normal heart sounds  PMI is not displaced  No murmur heard  Pulmonary/Chest: Effort normal and breath sounds normal  No accessory muscle usage  No apnea  No respiratory distress  She has no rhonchi  She has no rales  Abdominal: Soft  Normal appearance and bowel sounds are normal  She exhibits no shifting dullness  There is no hepatosplenomegaly  There is no tenderness  There is no rebound and no CVA tenderness  Musculoskeletal: Normal range of motion  She exhibits no edema or tenderness  Lymphadenopathy:        Head (right side): No submental and no submandibular adenopathy present  Head (left side): Submandibular adenopathy present   No submental adenopathy present  She has no cervical adenopathy  Neurological: She is alert  Skin: Skin is warm and intact  No rash noted  Psychiatric: She has a normal mood and affect  Her speech is normal    Nursing note and vitals reviewed          Recent Results (from the past 1008 hour(s))    Diabetes Eye Exam    Collection Time: 11/13/18  3:53 PM   Result Value Ref Range    Severity Alert     Right Eye Diabetic Retinopathy None     Left Eye Diabetic Retinopathy None    Hemoglobin A1C    Collection Time: 12/20/18 10:12 AM   Result Value Ref Range    Hemoglobin A1C 8 5 (H) 4 2 - 6 3 %     mg/dl   Lipid panel    Collection Time: 12/20/18 10:12 AM   Result Value Ref Range    Cholesterol 129 50 - 200 mg/dL    Triglycerides 169 (H) <=150 mg/dL    HDL, Direct 41 40 - 60 mg/dL    LDL Calculated 54 0 - 100 mg/dL    Non-HDL-Chol (CHOL-HDL) 88 mg/dl   CBC and differential    Collection Time: 12/20/18 12:46 PM   Result Value Ref Range    WBC 12 35 (H) 4 31 - 10 16 Thousand/uL    RBC 4 19 3 81 - 5 12 Million/uL    Hemoglobin 12 2 11 5 - 15 4 g/dL    Hematocrit 37 7 34 8 - 46 1 %    MCV 90 82 - 98 fL    MCH 29 1 26 8 - 34 3 pg    MCHC 32 4 31 4 - 37 4 g/dL    RDW 12 7 11 6 - 15 1 %    MPV 10 5 8 9 - 12 7 fL    Platelets 621 551 - 056 Thousands/uL    nRBC 0 /100 WBCs    Neutrophils Relative 72 43 - 75 %    Immat GRANS % 0 0 - 2 %    Lymphocytes Relative 19 14 - 44 %    Monocytes Relative 7 4 - 12 %    Eosinophils Relative 2 0 - 6 %    Basophils Relative 0 0 - 1 %    Neutrophils Absolute 8 90 (H) 1 85 - 7 62 Thousands/µL    Immature Grans Absolute 0 04 0 00 - 0 20 Thousand/uL    Lymphocytes Absolute 2 30 0 60 - 4 47 Thousands/µL    Monocytes Absolute 0 83 0 17 - 1 22 Thousand/µL    Eosinophils Absolute 0 24 0 00 - 0 61 Thousand/µL    Basophils Absolute 0 04 0 00 - 0 10 Thousands/µL   Comprehensive metabolic panel    Collection Time: 12/20/18 12:46 PM   Result Value Ref Range    Sodium 139 136 - 145 mmol/L Potassium 3 8 3 5 - 5 3 mmol/L    Chloride 101 100 - 108 mmol/L    CO2 27 21 - 32 mmol/L    ANION GAP 11 4 - 13 mmol/L    BUN 12 5 - 25 mg/dL    Creatinine 0 76 0 60 - 1 30 mg/dL    Glucose, Fasting 117 (H) 65 - 99 mg/dL    Calcium 9 3 8 3 - 10 1 mg/dL    AST 15 5 - 45 U/L    ALT 23 12 - 78 U/L    Alkaline Phosphatase 55 46 - 116 U/L    Total Protein 7 4 6 4 - 8 2 g/dL    Albumin 4 0 3 5 - 5 0 g/dL    Total Bilirubin 0 44 0 20 - 1 00 mg/dL    eGFR 81 ml/min/1 73sq m   ]    Procedure: Xr Chest Pa & Lateral    Result Date: 11/2/2018  Narrative: CHEST INDICATION:   E11 65: Type 2 diabetes mellitus with hyperglycemia Z79 4: Long term (current) use of insulin I10: Essential (primary) hypertension J40: Bronchitis, not specified as acute or chronic  COMPARISON:  Chest x-ray dated January 25, 2017  EXAM PERFORMED/VIEWS:  XR CHEST PA & LATERAL FINDINGS: Cardiomediastinal silhouette appears unremarkable  There is a small patchy airspace opacity noted at the lateral right upper lobe suspicious for bronchopneumonia  There is no pleural effusion or pneumothorax  Osseous structures appear within normal limits for patient age  Impression: Small patchy airspace opacity at the lateral right upper lobe suspicious for bronchopneumonia  A repeat chest x-ray in 4-6 weeks following treatment is recommended to assess for improvement/resolution and exclude an underlying lesion   Workstation performed: [id number]

## 2018-12-21 ENCOUNTER — TELEPHONE (OUTPATIENT)
Dept: INTERNAL MEDICINE CLINIC | Facility: CLINIC | Age: 68
End: 2018-12-21

## 2018-12-21 DIAGNOSIS — E11.65 TYPE 2 DIABETES MELLITUS WITH HYPERGLYCEMIA, WITH LONG-TERM CURRENT USE OF INSULIN (HCC): ICD-10-CM

## 2018-12-21 DIAGNOSIS — J20.9 ACUTE BRONCHITIS, UNSPECIFIED ORGANISM: Primary | ICD-10-CM

## 2018-12-21 DIAGNOSIS — Z79.4 TYPE 2 DIABETES MELLITUS WITH HYPERGLYCEMIA, WITH LONG-TERM CURRENT USE OF INSULIN (HCC): ICD-10-CM

## 2018-12-21 RX ORDER — ALBUTEROL SULFATE 90 UG/1
2 AEROSOL, METERED RESPIRATORY (INHALATION) EVERY 6 HOURS PRN
Qty: 18 G | Refills: 0 | Status: SHIPPED | OUTPATIENT
Start: 2018-12-21 | End: 2019-03-26 | Stop reason: ALTCHOICE

## 2018-12-21 NOTE — TELEPHONE ENCOUNTER
Spoke to the patient and gave her the results of the CXR and labs  The patient understood  She will use the inhaler, flonase and OTC cough medicine    She had no further questions

## 2018-12-21 NOTE — TELEPHONE ENCOUNTER
----- Message from Duran Evans MD sent at 12/21/2018  9:08 AM EST -----      ----- Message -----  From: Duran Evans MD  Sent: 12/21/2018   9:07 AM  To: Roshan Bird MA    Her x-ray was clear, does not show any area concerning for pneumonia, blood work showed mild white count elevation but the procalcitonin test that we had done to look for bacterial causes was negative  This is likely a viral infection  No need for antibiotics  I will put in for an inhaler to help her with the cough and the chest tightness  Continue the Flonase and the Delsym that she has been taking

## 2018-12-26 ENCOUNTER — OFFICE VISIT (OUTPATIENT)
Dept: INTERNAL MEDICINE CLINIC | Facility: CLINIC | Age: 68
End: 2018-12-26
Payer: MEDICARE

## 2018-12-26 VITALS
HEART RATE: 96 BPM | DIASTOLIC BLOOD PRESSURE: 70 MMHG | TEMPERATURE: 97.7 F | HEIGHT: 61 IN | RESPIRATION RATE: 15 BRPM | SYSTOLIC BLOOD PRESSURE: 130 MMHG | BODY MASS INDEX: 27.94 KG/M2 | WEIGHT: 148 LBS

## 2018-12-26 DIAGNOSIS — K21.00 GASTROESOPHAGEAL REFLUX DISEASE WITH ESOPHAGITIS: ICD-10-CM

## 2018-12-26 DIAGNOSIS — E78.00 HYPERCHOLESTEROLEMIA: ICD-10-CM

## 2018-12-26 DIAGNOSIS — E11.65 TYPE 2 DIABETES MELLITUS WITH HYPERGLYCEMIA, WITH LONG-TERM CURRENT USE OF INSULIN (HCC): ICD-10-CM

## 2018-12-26 DIAGNOSIS — I10 ESSENTIAL HYPERTENSION: Primary | ICD-10-CM

## 2018-12-26 DIAGNOSIS — Z79.4 TYPE 2 DIABETES MELLITUS WITH HYPERGLYCEMIA, WITH LONG-TERM CURRENT USE OF INSULIN (HCC): ICD-10-CM

## 2018-12-26 DIAGNOSIS — E55.9 VITAMIN D DEFICIENCY: ICD-10-CM

## 2018-12-26 PROCEDURE — 99214 OFFICE O/P EST MOD 30 MIN: CPT | Performed by: INTERNAL MEDICINE

## 2018-12-27 NOTE — PROGRESS NOTES
[facility] [place]      NAME: Samina Dockery  AGE: 80 y o  SEX: female  : 1950   MRN: [id number]    DATE: 2018  TIME: 7:52 AM    Assessment and Plan   1  Essential hypertension  Well controlled  2  Hypercholesterolemia  Well controlled on Crestor   - Lipid panel    3  Type 2 diabetes mellitus with hyperglycemia, with long-term current use of insulin (HCC)  Not optimally controlled  The patient will try to increase her exercise  Her hemoglobin A1c will be recheck in 3 months  - Comprehensive metabolic panel  - Hemoglobin A1C    4  Gastroesophageal reflux disease with esophagitis  Well controlled  5  Vitamin D deficiency  On replacement  Overall, the patient is doing pretty well  Her diabetic control is not optimal   She prefers to work on lifestyle changes rather than changing her medications  Her blood pressure and cholesterol are well controlled  Return to office in:   3 months    Chief Complaint     Chief Complaint   Patient presents with    Follow-up     3 months, foot exam today, [facility]       History of Present Illness     The patient returned to the office for re-evaluation of hypertension, hypercholesterolemia, type 2 diabetes, and esophageal reflux  Since her last visit she did recover from an episode of pneumonia this fall  Over the past week she has had a upper respiratory tract infection  This is mostly nasal congestion  She has had no fever  She has had no chest pain, cough, wheezing, etc   She is tolerating her medications well  The following portions of the patient's history were reviewed and updated as appropriate: allergies, current medications, past family history, past medical history, past social history, past surgical history and problem list     Review of Systems   Review of Systems   Constitutional: Negative  HENT: Positive for congestion, postnasal drip and rhinorrhea   Negative for ear pain, sore throat and trouble swallowing  Eyes: Negative for pain, discharge, redness and visual disturbance  Respiratory: Negative for cough, shortness of breath and wheezing  Cardiovascular: Negative  Gastrointestinal: Negative  Endocrine: Negative  Genitourinary: Negative for difficulty urinating, dysuria, frequency, hematuria and urgency  Musculoskeletal: Negative for arthralgias, gait problem, joint swelling and myalgias  Skin: Negative for rash  Neurological: Negative for dizziness, speech difficulty, weakness, light-headedness, numbness and headaches  Hematological: Negative  Psychiatric/Behavioral: Negative for confusion, decreased concentration, dysphoric mood and sleep disturbance  The patient is not nervous/anxious  Active Problem List     Patient Active Problem List   Diagnosis    Allergic rhinitis    Hypertension    Gastroesophageal reflux disease with esophagitis    Hypercholesterolemia    Type 2 diabetes mellitus with hyperglycemia (HCC)    Vitamin D deficiency    Wrist joint pain    Sciatica of right side    Bronchitis       Objective   /70 (BP Location: Left arm, Patient Position: Sitting, Cuff Size: Standard)   Pulse 96   Temp 97 7 °F (36 5 °C) (Tympanic)   Resp 15   Ht 5' 1" (1 549 m)   Wt 67 1 kg (148 lb)   BMI 27 96 kg/m²     Physical Exam   Constitutional: She is oriented to person, place, and time  She appears well-developed and well-nourished  No distress  HENT:   Head: Normocephalic and atraumatic  Right Ear: External ear normal    Left Ear: External ear normal    Nose: Nose normal    Mouth/Throat: Oropharynx is clear and moist    Neck: Neck supple  No JVD present  No tracheal deviation present  No thyromegaly present  Cardiovascular: Normal rate, regular rhythm, normal heart sounds and intact distal pulses  Exam reveals no gallop and no friction rub  Pulses are no weak pulses  No murmur heard    Pulses:       Dorsalis pedis pulses are 2+ on the right side, and 2+ on the left side  Posterior tibial pulses are 2+ on the right side, and 2+ on the left side  Pulmonary/Chest: Effort normal and breath sounds normal  She has no wheezes  She has no rales  She exhibits no tenderness  Abdominal: Soft  Bowel sounds are normal  She exhibits no distension and no mass  There is no tenderness  There is no rebound  Musculoskeletal: Normal range of motion  She exhibits no edema or tenderness  Feet:   Right Foot:   Skin Integrity: Negative for ulcer, skin breakdown, erythema, warmth, callus or dry skin  Left Foot:   Skin Integrity: Negative for ulcer, skin breakdown, erythema, warmth, callus or dry skin  Lymphadenopathy:     She has no cervical adenopathy  Neurological: She is alert and oriented to person, place, and time  Skin: Skin is warm and dry  Psychiatric: She has a normal mood and affect  Her behavior is normal  Judgment and thought content normal      Patient's shoes and socks removed  Right Foot/Ankle   Right Foot Inspection  Skin Exam: skin normal and skin intact no dry skin, no warmth, no callus, no erythema, no maceration, no abnormal color, no pre-ulcer, no ulcer and no callus                          Toe Exam: ROM and strength within normal limitsno swelling, no tenderness, erythema and  no right toe deformity  Sensory   Vibration: intact  Proprioception: intact   Monofilament testing: intact  Vascular  Capillary refills: < 3 seconds  The right DP pulse is 2+  The right PT pulse is 2+       Left Foot/Ankle  Left Foot Inspection  Skin Exam: skin normal and skin intactno dry skin, no warmth, no erythema, no maceration, normal color, no pre-ulcer, no ulcer and no callus                         Toe Exam: ROM and strength within normal limitsno swelling, no tenderness, no erythema and no left toe deformity                   Sensory   Vibration: intact  Proprioception: intact  Monofilament: intact  Vascular  Capillary refills: < 3 seconds  The left DP pulse is 2+  The left PT pulse is 2+  Assign Risk Category:  No deformity present; No loss of protective sensation;  No weak pulses       Risk: 0    Pertinent Laboratory/Diagnostic Studies:  Appointment on 12/20/2018   Component Date Value Ref Range Status    Procalcitonin 12/20/2018 <0 05  <=0 25 ng/ml Final   Office Visit on 12/20/2018   Component Date Value Ref Range Status    WBC 12/20/2018 12 35* 4 31 - 10 16 Thousand/uL Final    RBC 12/20/2018 4 19  3 81 - 5 12 Million/uL Final    Hemoglobin 12/20/2018 12 2  11 5 - 15 4 g/dL Final    Hematocrit 12/20/2018 37 7  34 8 - 46 1 % Final    MCV 12/20/2018 90  82 - 98 fL Final    MCH 12/20/2018 29 1  26 8 - 34 3 pg Final    MCHC 12/20/2018 32 4  31 4 - 37 4 g/dL Final    RDW 12/20/2018 12 7  11 6 - 15 1 % Final    MPV 12/20/2018 10 5  8 9 - 12 7 fL Final    Platelets 49/89/4445 250  149 - 390 Thousands/uL Final    nRBC 12/20/2018 0  /100 WBCs Final    Neutrophils Relative 12/20/2018 72  43 - 75 % Final    Immat GRANS % 12/20/2018 0  0 - 2 % Final    Lymphocytes Relative 12/20/2018 19  14 - 44 % Final    Monocytes Relative 12/20/2018 7  4 - 12 % Final    Eosinophils Relative 12/20/2018 2  0 - 6 % Final    Basophils Relative 12/20/2018 0  0 - 1 % Final    Neutrophils Absolute 12/20/2018 8 90* 1 85 - 7 62 Thousands/µL Final    Immature Grans Absolute 12/20/2018 0 04  0 00 - 0 20 Thousand/uL Final    Lymphocytes Absolute 12/20/2018 2 30  0 60 - 4 47 Thousands/µL Final    Monocytes Absolute 12/20/2018 0 83  0 17 - 1 22 Thousand/µL Final    Eosinophils Absolute 12/20/2018 0 24  0 00 - 0 61 Thousand/µL Final    Basophils Absolute 12/20/2018 0 04  0 00 - 0 10 Thousands/µL Final    Sodium 12/20/2018 139  136 - 145 mmol/L Final    Potassium 12/20/2018 3 8  3 5 - 5 3 mmol/L Final    Chloride 12/20/2018 101  100 - 108 mmol/L Final    CO2 12/20/2018 27  21 - 32 mmol/L Final    ANION GAP 12/20/2018 11  4 - 13 mmol/L Final    BUN 12/20/2018 12  5 - 25 mg/dL Final    Creatinine 12/20/2018 0 76  0 60 - 1 30 mg/dL Final    Glucose, Fasting 12/20/2018 117* 65 - 99 mg/dL Final    Calcium 12/20/2018 9 3  8 3 - 10 1 mg/dL Final    AST 12/20/2018 15  5 - 45 U/L Final    ALT 12/20/2018 23  12 - 78 U/L Final    Alkaline Phosphatase 12/20/2018 55  46 - 116 U/L Final    Total Protein 12/20/2018 7 4  6 4 - 8 2 g/dL Final    Albumin 12/20/2018 4 0  3 5 - 5 0 g/dL Final    Total Bilirubin 12/20/2018 0 44  0 20 - 1 00 mg/dL Final    eGFR 12/20/2018 81  ml/min/1 73sq m Final    INFLU A PCR 12/20/2018 None Detected  None Detected Final    INFLU B PCR 12/20/2018 None Detected  None Detected Final    RSV PCR 12/20/2018 None Detected  None Detected Final   Appointment on 12/20/2018   Component Date Value Ref Range Status    Hemoglobin A1C 12/20/2018 8 5* 4 2 - 6 3 % Final    EAG 12/20/2018 197  mg/dl Final    Cholesterol 12/20/2018 129  50 - 200 mg/dL Final    Triglycerides 12/20/2018 169* <=150 mg/dL Final    HDL, Direct 12/20/2018 41  40 - 60 mg/dL Final    LDL Calculated 12/20/2018 54  0 - 100 mg/dL Final    Non-HDL-Chol (CHOL-HDL) 12/20/2018 88  mg/dl Final   Orders Only on 11/13/2018   Component Date Value Ref Range Status    Severity 11/13/2018 Alert   Final    Right Eye Diabetic Retinopathy 11/13/2018 None   Final    Left Eye Diabetic Retinopathy 11/13/2018 None   Final   Appointment on 11/02/2018   Component Date Value Ref Range Status    WBC 11/02/2018 12 37* 4 31 - 10 16 Thousand/uL Final    RBC 11/02/2018 4 20  3 81 - 5 12 Million/uL Final    Hemoglobin 11/02/2018 12 0  11 5 - 15 4 g/dL Final    Hematocrit 11/02/2018 36 7  34 8 - 46 1 % Final    MCV 11/02/2018 87  82 - 98 fL Final    MCH 11/02/2018 28 6  26 8 - 34 3 pg Final    MCHC 11/02/2018 32 7  31 4 - 37 4 g/dL Final    RDW 11/02/2018 12 7  11 6 - 15 1 % Final    MPV 11/02/2018 10 9  8 9 - 12 7 fL Final    Platelets 13/12/0465 374  561 - 158 Thousands/uL Final    nRBC 11/02/2018 0  /100 WBCs Final    Neutrophils Relative 11/02/2018 71  43 - 75 % Final    Immat GRANS % 11/02/2018 0  0 - 2 % Final    Lymphocytes Relative 11/02/2018 23  14 - 44 % Final    Monocytes Relative 11/02/2018 5  4 - 12 % Final    Eosinophils Relative 11/02/2018 1  0 - 6 % Final    Basophils Relative 11/02/2018 0  0 - 1 % Final    Neutrophils Absolute 11/02/2018 8 69* 1 85 - 7 62 Thousands/µL Final    Immature Grans Absolute 11/02/2018 0 05  0 00 - 0 20 Thousand/uL Final    Lymphocytes Absolute 11/02/2018 2 78  0 60 - 4 47 Thousands/µL Final    Monocytes Absolute 11/02/2018 0 65  0 17 - 1 22 Thousand/µL Final    Eosinophils Absolute 11/02/2018 0 17  0 00 - 0 61 Thousand/µL Final    Basophils Absolute 11/02/2018 0 03  0 00 - 0 10 Thousands/µL Final    Sodium 11/02/2018 138  136 - 145 mmol/L Final    Potassium 11/02/2018 3 8  3 5 - 5 3 mmol/L Final    Chloride 11/02/2018 104  100 - 108 mmol/L Final    CO2 11/02/2018 25  21 - 32 mmol/L Final    ANION GAP 11/02/2018 9  4 - 13 mmol/L Final    BUN 11/02/2018 17  5 - 25 mg/dL Final    Creatinine 11/02/2018 0 69  0 60 - 1 30 mg/dL Final    Glucose, Fasting 11/02/2018 95  65 - 99 mg/dL Final    Calcium 11/02/2018 9 4  8 3 - 10 1 mg/dL Final    AST 11/02/2018 12  5 - 45 U/L Final    ALT 11/02/2018 19  12 - 78 U/L Final    Alkaline Phosphatase 11/02/2018 56  46 - 116 U/L Final    Total Protein 11/02/2018 7 4  6 4 - 8 2 g/dL Final    Albumin 11/02/2018 3 7  3 5 - 5 0 g/dL Final    Total Bilirubin 11/02/2018 0 24  0 20 - 1 00 mg/dL Final    eGFR 11/02/2018 90  ml/min/1 73sq m Final    TSH 3RD GENERATON 11/02/2018 1 690  0 358 - 3 740 uIU/mL Final   Office Visit on 10/19/2018   Component Date Value Ref Range Status    Throat Culture 10/19/2018 Negative for beta-hemolytic Streptococcus   Final     RAPID STREP A 10/19/2018 Negative  Negative Final           Current Medications     Current Outpatient Prescriptions:   albuterol (VENTOLIN HFA) 90 mcg/act inhaler, Inhale 2 puffs every 6 (six) hours as needed for wheezing, Disp: 18 g, Rfl: 0    guaiFENesin (MUCINEX) 600 mg 12 hr tablet, Take 2 tablets (1,200 mg total) by mouth every 12 (twelve) hours, Disp: 20 tablet, Rfl: 0    insulin glargine (BASAGLAR KWIKPEN) 100 units/mL injection pen, Inject 30 Units under the skin daily at bedtime, Disp: 5 pen, Rfl: 4    Insulin Pen Needle (EASY TOUCH PEN NEEDLES) 31G X 8 MM MISC, by Does not apply route daily, Disp: 100 each, Rfl: 1    lisinopril-hydrochlorothiazide (PRINZIDE,ZESTORETIC) 20-12 5 MG per tablet, Take 1 tablet by mouth daily, Disp: 30 tablet, Rfl: 4    metFORMIN (GLUCOPHAGE) 500 mg tablet, Take 2 tablets (1,000 mg total) by mouth 2 (two) times a day, Disp: 120 tablet, Rfl: 0    rosuvastatin (CRESTOR) 10 MG tablet, Take 1 tablet (10 mg total) by mouth daily, Disp: 90 tablet, Rfl: 3    sitaGLIPtin (JANUVIA) 100 mg tablet, Take 1 tablet (100 mg total) by mouth daily, Disp: 30 tablet, Rfl: 4    fluticasone (FLONASE) 50 mcg/act nasal spray, 1 spray into each nostril daily (Patient not taking: Reported on 12/26/2018 ), Disp: 16 g, Rfl: 0      Genet Lorenzo MD

## 2019-03-11 DIAGNOSIS — E11.9 TYPE 2 DIABETES MELLITUS WITHOUT COMPLICATION, WITHOUT LONG-TERM CURRENT USE OF INSULIN (HCC): ICD-10-CM

## 2019-03-19 DIAGNOSIS — I10 ESSENTIAL HYPERTENSION: ICD-10-CM

## 2019-03-19 RX ORDER — LISINOPRIL AND HYDROCHLOROTHIAZIDE 20; 12.5 MG/1; MG/1
1 TABLET ORAL DAILY
Qty: 30 TABLET | Refills: 4 | Status: SHIPPED | OUTPATIENT
Start: 2019-03-19 | End: 2019-06-12 | Stop reason: SDUPTHER

## 2019-03-23 ENCOUNTER — APPOINTMENT (OUTPATIENT)
Dept: LAB | Facility: MEDICAL CENTER | Age: 69
End: 2019-03-23
Payer: MEDICARE

## 2019-03-23 LAB
ALBUMIN SERPL BCP-MCNC: 4 G/DL (ref 3.5–5)
ALP SERPL-CCNC: 56 U/L (ref 46–116)
ALT SERPL W P-5'-P-CCNC: 25 U/L (ref 12–78)
ANION GAP SERPL CALCULATED.3IONS-SCNC: 4 MMOL/L (ref 4–13)
AST SERPL W P-5'-P-CCNC: 15 U/L (ref 5–45)
BILIRUB SERPL-MCNC: 0.53 MG/DL (ref 0.2–1)
BUN SERPL-MCNC: 13 MG/DL (ref 5–25)
CALCIUM SERPL-MCNC: 9.2 MG/DL (ref 8.3–10.1)
CHLORIDE SERPL-SCNC: 103 MMOL/L (ref 100–108)
CHOLEST SERPL-MCNC: 181 MG/DL (ref 50–200)
CO2 SERPL-SCNC: 30 MMOL/L (ref 21–32)
CREAT SERPL-MCNC: 0.79 MG/DL (ref 0.6–1.3)
EST. AVERAGE GLUCOSE BLD GHB EST-MCNC: 206 MG/DL
GFR SERPL CREATININE-BSD FRML MDRD: 78 ML/MIN/1.73SQ M
GLUCOSE P FAST SERPL-MCNC: 130 MG/DL (ref 65–99)
HBA1C MFR BLD: 8.8 % (ref 4.2–6.3)
HDLC SERPL-MCNC: 38 MG/DL (ref 40–60)
LDLC SERPL CALC-MCNC: 107 MG/DL (ref 0–100)
NONHDLC SERPL-MCNC: 143 MG/DL
POTASSIUM SERPL-SCNC: 3.8 MMOL/L (ref 3.5–5.3)
PROT SERPL-MCNC: 7.3 G/DL (ref 6.4–8.2)
SODIUM SERPL-SCNC: 137 MMOL/L (ref 136–145)
TRIGL SERPL-MCNC: 181 MG/DL

## 2019-03-23 PROCEDURE — 36415 COLL VENOUS BLD VENIPUNCTURE: CPT | Performed by: INTERNAL MEDICINE

## 2019-03-23 PROCEDURE — 80061 LIPID PANEL: CPT | Performed by: INTERNAL MEDICINE

## 2019-03-23 PROCEDURE — 83036 HEMOGLOBIN GLYCOSYLATED A1C: CPT | Performed by: INTERNAL MEDICINE

## 2019-03-23 PROCEDURE — 80053 COMPREHEN METABOLIC PANEL: CPT | Performed by: INTERNAL MEDICINE

## 2019-03-25 DIAGNOSIS — E11.9 TYPE 2 DIABETES MELLITUS WITHOUT COMPLICATION, WITHOUT LONG-TERM CURRENT USE OF INSULIN (HCC): ICD-10-CM

## 2019-03-26 ENCOUNTER — OFFICE VISIT (OUTPATIENT)
Dept: INTERNAL MEDICINE CLINIC | Facility: CLINIC | Age: 69
End: 2019-03-26
Payer: MEDICARE

## 2019-03-26 VITALS
BODY MASS INDEX: 28.89 KG/M2 | HEART RATE: 92 BPM | WEIGHT: 153 LBS | TEMPERATURE: 99.5 F | RESPIRATION RATE: 15 BRPM | SYSTOLIC BLOOD PRESSURE: 130 MMHG | HEIGHT: 61 IN | DIASTOLIC BLOOD PRESSURE: 78 MMHG

## 2019-03-26 DIAGNOSIS — I10 ESSENTIAL HYPERTENSION: Primary | ICD-10-CM

## 2019-03-26 DIAGNOSIS — Z79.4 TYPE 2 DIABETES MELLITUS WITH HYPERGLYCEMIA, WITH LONG-TERM CURRENT USE OF INSULIN (HCC): ICD-10-CM

## 2019-03-26 DIAGNOSIS — E55.9 VITAMIN D DEFICIENCY: ICD-10-CM

## 2019-03-26 DIAGNOSIS — Z13.820 SCREENING FOR OSTEOPOROSIS: ICD-10-CM

## 2019-03-26 DIAGNOSIS — E11.65 TYPE 2 DIABETES MELLITUS WITH HYPERGLYCEMIA, WITH LONG-TERM CURRENT USE OF INSULIN (HCC): ICD-10-CM

## 2019-03-26 DIAGNOSIS — K21.00 GASTROESOPHAGEAL REFLUX DISEASE WITH ESOPHAGITIS: ICD-10-CM

## 2019-03-26 DIAGNOSIS — E78.00 HYPERCHOLESTEROLEMIA: ICD-10-CM

## 2019-03-26 DIAGNOSIS — Z12.11 ENCOUNTER FOR SCREENING COLONOSCOPY: ICD-10-CM

## 2019-03-26 PROCEDURE — G0439 PPPS, SUBSEQ VISIT: HCPCS | Performed by: INTERNAL MEDICINE

## 2019-03-26 PROCEDURE — 99214 OFFICE O/P EST MOD 30 MIN: CPT | Performed by: INTERNAL MEDICINE

## 2019-03-26 NOTE — PROGRESS NOTES
Assessment and Plan:    Problem List Items Addressed This Visit     None        Health Maintenance Due   Topic Date Due    Medicare Annual Wellness Visit (AWV)  1950    BMI: Followup Plan  04/10/1969    HEPATITIS B VACCINES (1 of 3 - Risk 3-dose series) 04/10/1970    DTaP,Tdap,and Td Vaccines (1 - Tdap) 10/27/2016     The patient is doing pretty well  She is working to maintain a healthy lifestyle  She does not smoke, drink, or use illicit drugs  She is trying to watch her diet although she has been drinking more soda than in the past   She does not have a formal exercise program although she is active and walks usually 2 days a week  Her home environment is safe  She has no symptoms of psychiatric or cognitive dysfunction  She has not fallen  She is up-to-date with her health screening studies with the exception of a DEXA scan which has been ordered  She will be having a colonoscopy in the near future  She is up-to-date with pneumococcal, influenza, and tetanus vaccines  We discussed herpes zoster vaccination  I discussed advance care planning with the patient  She does have a living will  Her  her min would be her power of   If he is not able to fill this role, her sister Melo Mendez would be her alternate  I reviewed with her the nature of advanced directives  HPI:  Tello Alas is a 80 y o  female here for her Subsequent Wellness Visit      Patient Active Problem List   Diagnosis    Allergic rhinitis    Hypertension    Gastroesophageal reflux disease with esophagitis    Hypercholesterolemia    Type 2 diabetes mellitus with hyperglycemia ([address] )    Vitamin D deficiency    Wrist joint pain    Sciatica of right side    Bronchitis     Past Medical History:   Diagnosis Date    Diabetes mellitus ([address] )     Hyperlipidemia     Hypertension      Past Surgical History:   Procedure Laterality Date    CARPAL TUNNEL RELEASE      DILATION AND CURETTAGE OF UTERUS  HYSTERECTOMY      Total Abdominal     Family History   Problem Relation Age of Onset    Diabetes Father     Heart attack Father     Uterine cancer Maternal Grandmother      Social History     Tobacco Use   Smoking Status Never Smoker   Smokeless Tobacco Never Used     Social History     Substance and Sexual Activity   Alcohol Use No      Social History     Substance and Sexual Activity   Drug Use No       Current Outpatient Medications   Medication Sig Dispense Refill    albuterol (VENTOLIN HFA) 90 mcg/act inhaler Inhale 2 puffs every 6 (six) hours as needed for wheezing 18 g 0    fluticasone (FLONASE) 50 mcg/act nasal spray 1 spray into each nostril daily (Patient not taking: Reported on 12/26/2018 ) 16 g 0    guaiFENesin (MUCINEX) 600 mg 12 hr tablet Take 2 tablets (1,200 mg total) by mouth every 12 (twelve) hours 20 tablet 0    insulin glargine (BASAGLAR KWIKPEN) 100 units/mL injection pen Inject 30 Units under the skin daily at bedtime 5 pen 4    Insulin Pen Needle (EASY TOUCH PEN NEEDLES) 31G X 8 MM MISC by Does not apply route daily 100 each 1    lisinopril-hydrochlorothiazide (PRINZIDE,ZESTORETIC) 20-12 5 MG per tablet Take 1 tablet by mouth daily 30 tablet 4    metFORMIN (GLUCOPHAGE) 500 mg tablet Take 2 tablets (1,000 mg total) by mouth 2 (two) times a day 120 tablet 1    rosuvastatin (CRESTOR) 10 MG tablet Take 1 tablet (10 mg total) by mouth daily 90 tablet 3    sitaGLIPtin (JANUVIA) 100 mg tablet Take 1 tablet (100 mg total) by mouth daily 30 tablet 4     No current facility-administered medications for this visit  Allergies   Allergen Reactions    Pioglitazone      Annotation - [id number]: Numbness in arms;  Annotation - [id number]: numbness    Wound Dressings Rash     Immunization History   Administered Date(s) Administered    INFLUENZA 11/26/2014, 09/10/2015, 11/17/2016, 10/06/2017, 09/13/2018    Influenza Quadrivalent Preservative Free 3 years and older IM 11/26/2014    Influenza Quadrivalent, 6-35 Months IM 09/10/2015    Influenza Split High Dose Preservative Free IM 10/06/2017    Influenza TIV (IM) 2011, 10/23/2013, 2016    Pneumococcal Conjugate 13-Valent 2017    Pneumococcal Polysaccharide PPV23 2018    Td (adult), adsorbed 10/26/2016       Patient Care Team:  Sincere Sifuentes MD as PCP - General  Mike Aldana MD    Medicare Screening Tests and Risk Assessments:  Lupe Nguyen is here for her Subsequent Wellness visit  Health Risk Assessment:  Patient rates overall health as good  Patient feels that their physical health rating is Same  Eyesight was rated as Same  Hearing was rated as Same  Patient feels that their emotional and mental health rating is Same  Pain experienced by patient in the last 7 days has been Some  Patient's pain rating has been 6/10  Patient states that she has experienced no weight loss or gain in last 6 months  Emotional/Mental Health:  Patient has been feeling nervous/anxious  PHQ-9 Depression Screening:    Frequency of the following problems over the past two weeks:      1  Little interest or pleasure in doing things: 1 - several days      2  Feeling down, depressed, or hopeless: 2 - more than half the days      3  Trouble falling or staying asleep, or sleeping too much: 2 - more than half the days      4  Feeling tired or having little energy: 2 - more than half the days      5  Poor appetite or overeatin - several days      6  Feeling bad about yourself - or that you are a failure or have let yourself or your family down: 1 - several days      7  Trouble concentrating on things, such as reading the newspaper or watching television: 1 - several days      8  Moving or speaking so slowly that other people could have noticed  Or the opposite - being so fidgety or restless that you have been moving around a lot more than usual: 1 - several days      9   Thoughts that you would be better off dead, or of hurting yourself in some way: 1 - several days  PHQ-2 Score: 3  PHQ-9 Score: 12    Broken Bones/Falls: Fall Risk Assessment:    In the past year, patient has experienced: No history of falling in past year          Bladder/Bowel:  Patient has leaked urine accidently in the last six months  Patient reports no loss of bowel control  Immunizations:  Patient has had a flu vaccination within the last year  Patient has received a pneumonia shot  Patient has not received a shingles shot  Patient has received tetanus/diphtheria shot  Date of tetanus/diphtheria shot: 10/26/2016    Home Safety:  Patient does not have trouble with stairs inside or outside of their home  Patient currently reports that there are no safety hazards present in home, working smoke alarms, working carbon monoxide detectors  Preventative Screenings:   Breast cancer screening performed, colon cancer screen completed, cholesterol screen completed, glaucoma eye exam completed,     Nutrition:  Current diet: Limited junk food, Diabetic and Regular with servings of the following:    Medications:  Patient is not currently taking any over-the-counter supplements  Patient is able to manage medications  Lifestyle Choices:  Patient reports no tobacco use  Patient has not smoked or used tobacco in the past   Patient reports alcohol use  Alcohol use per week: occasionally  Patient drives a vehicle  Patient wears seat belt  Current level of exercise of physical activity described by patient as: walking, gardening, house cleaning  Activities of Daily Living:  Can get out of bed by his or her self, able to dress self, able to make own meals, able to do own shopping, able to bathe self, can do own laundry/housekeeping, can manage own money, pay bills and track expenses    Previous Hospitalizations:  No hospitalization or ED visit in past 12 months        Advanced Directives:  Patient has decided on a power of     Patient has spoken to designated power of   Patient has completed advanced directive  Preventative Screening/Counseling:      Cardiovascular:      General: Screening Current          Diabetes:      General: Screening Current          Colorectal Cancer:      General: Screening Current          Breast Cancer:      General: Screening Current          Cervical Cancer:      General: Screening Not Indicated          Osteoporosis:      General: Risks and Benefits Discussed      Due for studies: DXA Axial          AAA:      General: Screening Not Indicated          HIV:      General: Screening Not Indicated          Hepatitis C:      General: Screening Current        Advanced Directives:   Patient has living will for healthcare, has durable POA for healthcare, patient does not have an advanced directive  Information on ACP and/or AD provided  End of life assessment reviewed with patient  Provider agrees with end of life decisions        Immunizations:      Influenza: Influenza UTD This Year      Pneumococcal: Lifetime Vaccine Completed      Shingrix: Risks & Benefits Discussed      Hepatitis B (Low risk patients): Series Not Indicated      TD: Risks & Benefits Discussed

## 2019-03-27 NOTE — PROGRESS NOTES
[facility] Internal Medicine [place]      NAME: Shannan Mccarthy  AGE: 80 y o  SEX: female  : 1950   MRN: [id number]    DATE: 3/27/2019  TIME: 10:15 AM    Assessment and Plan   1  Essential hypertension  Well controlled  2  Hypercholesterolemia  Well controlled on rosuvastatin  - Lipid panel    3  Type 2 diabetes mellitus with hyperglycemia, with long-term current use of insulin (HCC)  Not well controlled at present  The patient attributes this to increased soda ingestion  She will work to correct this  - Comprehensive metabolic panel  - Hemoglobin A1C    4  Gastroesophageal reflux disease with esophagitis  Currently adequately controlled with dietary modification  5  Vitamin D deficiency  On replacement  6  Screening for osteoporosis  - DXA bone density spine hip and pelvis; Future    7  Encounter for screening colonoscopy  - Ambulatory referral to Gastroenterology; Future    The patient is doing generally well  Her diabetic control is suboptimal   She attributes this to dietary indiscretions and is going to try to correct this  She would prefer to see the effective this on her glycemic control before adjusting her medications  Her other issues are currently stable  Return to office in:  3 months    Chief Complaint     Chief Complaint   Patient presents with    Medicare Wellness Visit    Follow-up     3 months       History of Present Illness     The patient returned to the office for re-evaluation of hypertension, hypercholesterolemia, type 2 diabetes, and esophageal reflux  Since her last visit she has been feeling well  She has had no chest pain, shortness of breath, palpitations, or dizziness  She did have some crampy abdominal pain yesterday with diarrhea  This seems to be improving  She had no fever, vomiting, or sign of GI hemorrhage  She had not had any other GI symptoms since her last visit except for this  She is having no issues with her medications    She has had no symptoms of hypoglycemia  The following portions of the patient's history were reviewed and updated as appropriate: allergies, current medications, past family history, past medical history, past social history, past surgical history and problem list     Review of Systems   Review of Systems   Constitutional: Negative  HENT: Negative for congestion, ear pain, postnasal drip, rhinorrhea, sore throat and trouble swallowing  Eyes: Negative for pain, discharge, redness and visual disturbance  Respiratory: Negative for cough, shortness of breath and wheezing  Cardiovascular: Negative  Gastrointestinal: Positive for abdominal pain and diarrhea  Negative for abdominal distention, anal bleeding, blood in stool, constipation, nausea, rectal pain and vomiting  Endocrine: Negative  Genitourinary: Negative for difficulty urinating, dysuria, frequency, hematuria and urgency  Musculoskeletal: Negative for arthralgias, gait problem, joint swelling and myalgias  Skin: Negative for rash  Neurological: Negative for dizziness, speech difficulty, weakness, light-headedness, numbness and headaches  Hematological: Negative  Psychiatric/Behavioral: Negative for confusion, decreased concentration, dysphoric mood and sleep disturbance  The patient is not nervous/anxious  Active Problem List     Patient Active Problem List   Diagnosis    Allergic rhinitis    Hypertension    Gastroesophageal reflux disease with esophagitis    Hypercholesterolemia    Type 2 diabetes mellitus with hyperglycemia (HCC)    Vitamin D deficiency    Wrist joint pain    Sciatica of right side    Bronchitis       Objective   /78 (BP Location: Left arm, Patient Position: Sitting, Cuff Size: Standard)   Pulse 92   Temp 99 5 °F (37 5 °C) (Tympanic)   Resp 15   Ht 5' 1" (1 549 m)   Wt 69 4 kg (153 lb)   BMI 28 91 kg/m²     Physical Exam   Constitutional: She is oriented to person, place, and time   She appears well-developed and well-nourished  No distress  HENT:   Head: Normocephalic and atraumatic  Neck: Neck supple  No JVD present  No tracheal deviation present  No thyromegaly present  Cardiovascular: Normal rate, regular rhythm, normal heart sounds and intact distal pulses  Exam reveals no gallop and no friction rub  No murmur heard  Pulmonary/Chest: Effort normal and breath sounds normal  She has no wheezes  She has no rales  She exhibits no tenderness  Abdominal: Soft  Bowel sounds are normal  She exhibits no distension and no mass  There is no tenderness  There is no rebound  Musculoskeletal: Normal range of motion  She exhibits no edema or tenderness  Lymphadenopathy:     She has no cervical adenopathy  Neurological: She is alert and oriented to person, place, and time  Skin: Skin is warm and dry  Psychiatric: She has a normal mood and affect  Her behavior is normal  Judgment and thought content normal        Pertinent Laboratory/Diagnostic Studies:  No visits with results within 3 Month(s) from this visit     Latest known visit with results is:   Office Visit on 12/26/2018   Component Date Value Ref Range Status    Sodium 03/23/2019 137  136 - 145 mmol/L Final    Potassium 03/23/2019 3 8  3 5 - 5 3 mmol/L Final    Chloride 03/23/2019 103  100 - 108 mmol/L Final    CO2 03/23/2019 30  21 - 32 mmol/L Final    ANION GAP 03/23/2019 4  4 - 13 mmol/L Final    BUN 03/23/2019 13  5 - 25 mg/dL Final    Creatinine 03/23/2019 0 79  0 60 - 1 30 mg/dL Final    Glucose, Fasting 03/23/2019 130* 65 - 99 mg/dL Final    Calcium 03/23/2019 9 2  8 3 - 10 1 mg/dL Final    AST 03/23/2019 15  5 - 45 U/L Final    ALT 03/23/2019 25  12 - 78 U/L Final    Alkaline Phosphatase 03/23/2019 56  46 - 116 U/L Final    Total Protein 03/23/2019 7 3  6 4 - 8 2 g/dL Final    Albumin 03/23/2019 4 0  3 5 - 5 0 g/dL Final    Total Bilirubin 03/23/2019 0 53  0 20 - 1 00 mg/dL Final    eGFR 03/23/2019 78  ml/min/1 73sq m Final    Cholesterol 03/23/2019 181  50 - 200 mg/dL Final    Triglycerides 03/23/2019 181* <=150 mg/dL Final    HDL, Direct 03/23/2019 38* 40 - 60 mg/dL Final    LDL Calculated 03/23/2019 107* 0 - 100 mg/dL Final    Non-HDL-Chol (CHOL-HDL) 03/23/2019 143  mg/dl Final    Hemoglobin A1C 03/23/2019 8 8* 4 2 - 6 3 % Final    EAG 03/23/2019 206  mg/dl Final           Current Medications     Current Outpatient Medications:     insulin glargine (BASAGLAR KWIKPEN) 100 units/mL injection pen, Inject 30 Units under the skin daily at bedtime, Disp: 5 pen, Rfl: 4    Insulin Pen Needle (EASY TOUCH PEN NEEDLES) 31G X 8 MM MISC, by Does not apply route daily, Disp: 100 each, Rfl: 1    lisinopril-hydrochlorothiazide (PRINZIDE,ZESTORETIC) 20-12 5 MG per tablet, Take 1 tablet by mouth daily, Disp: 30 tablet, Rfl: 4    metFORMIN (GLUCOPHAGE) 500 mg tablet, Take 2 tablets (1,000 mg total) by mouth 2 (two) times a day, Disp: 120 tablet, Rfl: 1    rosuvastatin (CRESTOR) 10 MG tablet, Take 1 tablet (10 mg total) by mouth daily, Disp: 90 tablet, Rfl: 3    sitaGLIPtin (JANUVIA) 100 mg tablet, Take 1 tablet (100 mg total) by mouth daily, Disp: 30 tablet, Rfl: 4      Tyrell Hoyos MD

## 2019-04-01 ENCOUNTER — HOSPITAL ENCOUNTER (OUTPATIENT)
Dept: BONE DENSITY | Facility: MEDICAL CENTER | Age: 69
Discharge: HOME | End: 2019-04-01
Payer: MEDICARE

## 2019-04-01 DIAGNOSIS — Z13.820 SCREENING FOR OSTEOPOROSIS: ICD-10-CM

## 2019-04-01 PROCEDURE — 77080 DXA BONE DENSITY AXIAL: CPT

## 2019-06-04 LAB
LEFT EYE DIABETIC RETINOPATHY: NORMAL
RIGHT EYE DIABETIC RETINOPATHY: NORMAL

## 2019-06-06 DIAGNOSIS — E11.8 TYPE 2 DIABETES MELLITUS WITH COMPLICATION, WITH LONG-TERM CURRENT USE OF INSULIN (HCC): ICD-10-CM

## 2019-06-06 DIAGNOSIS — Z79.4 TYPE 2 DIABETES MELLITUS WITH COMPLICATION, WITH LONG-TERM CURRENT USE OF INSULIN (HCC): ICD-10-CM

## 2019-06-10 DIAGNOSIS — E11.9 TYPE 2 DIABETES MELLITUS WITHOUT COMPLICATION, WITHOUT LONG-TERM CURRENT USE OF INSULIN (HCC): ICD-10-CM

## 2019-06-10 DIAGNOSIS — I10 ESSENTIAL HYPERTENSION: ICD-10-CM

## 2019-06-12 DIAGNOSIS — I10 ESSENTIAL HYPERTENSION: ICD-10-CM

## 2019-06-13 RX ORDER — LISINOPRIL AND HYDROCHLOROTHIAZIDE 20; 12.5 MG/1; MG/1
1 TABLET ORAL DAILY
Qty: 90 TABLET | Refills: 1 | Status: SHIPPED | OUTPATIENT
Start: 2019-06-13 | End: 2019-12-18 | Stop reason: SDUPTHER

## 2019-07-09 ENCOUNTER — APPOINTMENT (OUTPATIENT)
Dept: LAB | Facility: MEDICAL CENTER | Age: 69
End: 2019-07-09
Payer: MEDICARE

## 2019-07-09 LAB
ALBUMIN SERPL BCP-MCNC: 3.7 G/DL (ref 3.5–5)
ALP SERPL-CCNC: 56 U/L (ref 46–116)
ALT SERPL W P-5'-P-CCNC: 20 U/L (ref 12–78)
ANION GAP SERPL CALCULATED.3IONS-SCNC: 6 MMOL/L (ref 4–13)
AST SERPL W P-5'-P-CCNC: 13 U/L (ref 5–45)
BILIRUB SERPL-MCNC: 0.28 MG/DL (ref 0.2–1)
BUN SERPL-MCNC: 18 MG/DL (ref 5–25)
CALCIUM SERPL-MCNC: 9.2 MG/DL (ref 8.3–10.1)
CHLORIDE SERPL-SCNC: 103 MMOL/L (ref 100–108)
CHOLEST SERPL-MCNC: 146 MG/DL (ref 50–200)
CO2 SERPL-SCNC: 28 MMOL/L (ref 21–32)
CREAT SERPL-MCNC: 0.82 MG/DL (ref 0.6–1.3)
EST. AVERAGE GLUCOSE BLD GHB EST-MCNC: 200 MG/DL
GFR SERPL CREATININE-BSD FRML MDRD: 74 ML/MIN/1.73SQ M
GLUCOSE P FAST SERPL-MCNC: 142 MG/DL (ref 65–99)
HBA1C MFR BLD: 8.6 % (ref 4.2–6.3)
HDLC SERPL-MCNC: 34 MG/DL (ref 40–60)
LDLC SERPL CALC-MCNC: 65 MG/DL (ref 0–100)
NONHDLC SERPL-MCNC: 112 MG/DL
POTASSIUM SERPL-SCNC: 3.8 MMOL/L (ref 3.5–5.3)
PROT SERPL-MCNC: 7 G/DL (ref 6.4–8.2)
SODIUM SERPL-SCNC: 137 MMOL/L (ref 136–145)
TRIGL SERPL-MCNC: 233 MG/DL

## 2019-07-09 PROCEDURE — 36415 COLL VENOUS BLD VENIPUNCTURE: CPT | Performed by: INTERNAL MEDICINE

## 2019-07-09 PROCEDURE — 83036 HEMOGLOBIN GLYCOSYLATED A1C: CPT | Performed by: INTERNAL MEDICINE

## 2019-07-09 PROCEDURE — 80053 COMPREHEN METABOLIC PANEL: CPT | Performed by: INTERNAL MEDICINE

## 2019-07-09 PROCEDURE — 80061 LIPID PANEL: CPT | Performed by: INTERNAL MEDICINE

## 2019-07-10 ENCOUNTER — OFFICE VISIT (OUTPATIENT)
Dept: INTERNAL MEDICINE CLINIC | Facility: CLINIC | Age: 69
End: 2019-07-10
Payer: MEDICARE

## 2019-07-10 VITALS
TEMPERATURE: 97.9 F | HEART RATE: 84 BPM | HEIGHT: 61 IN | SYSTOLIC BLOOD PRESSURE: 124 MMHG | RESPIRATION RATE: 14 BRPM | DIASTOLIC BLOOD PRESSURE: 69 MMHG | WEIGHT: 153 LBS | BODY MASS INDEX: 28.89 KG/M2

## 2019-07-10 DIAGNOSIS — E78.00 HYPERCHOLESTEROLEMIA: ICD-10-CM

## 2019-07-10 DIAGNOSIS — Z79.4 TYPE 2 DIABETES MELLITUS WITH HYPERGLYCEMIA, WITH LONG-TERM CURRENT USE OF INSULIN (HCC): Primary | ICD-10-CM

## 2019-07-10 DIAGNOSIS — E55.9 VITAMIN D DEFICIENCY: ICD-10-CM

## 2019-07-10 DIAGNOSIS — E11.65 TYPE 2 DIABETES MELLITUS WITH HYPERGLYCEMIA, WITH LONG-TERM CURRENT USE OF INSULIN (HCC): Primary | ICD-10-CM

## 2019-07-10 DIAGNOSIS — I10 ESSENTIAL HYPERTENSION: ICD-10-CM

## 2019-07-10 PROCEDURE — 99214 OFFICE O/P EST MOD 30 MIN: CPT | Performed by: INTERNAL MEDICINE

## 2019-07-10 NOTE — PROGRESS NOTES
[facility] Internal Medicine [place]      NAME: Christina Marcus  AGE: 77 y o  SEX: female  : 1950   MRN: [id number]    DATE: 7/10/2019  TIME: 5:48 PM    Assessment and Plan   1  Type 2 diabetes mellitus with hyperglycemia, with long-term current use of insulin ([address] )  This remains suboptimally controlled  The patient has recently started a more aggressive exercise program and would like to see the results of this before adjusting her medications  - Comprehensive metabolic panel; Future  - Hemoglobin A1C; Future    2  Essential hypertension  Well controlled  3  Hypercholesterolemia  Controlled on rosuvastatin  - Lipid panel; Future    4  Vitamin D deficiency  On replacement  5  Right foot pain  Exam suggest the possibility of Madden's neuroma  She is scheduled to see Dr Nicolas Dorman of Podiatry  Overall, the patient is doing reasonably well  Her blood pressure and lipids are satisfactorily controlled  We will repeat hemoglobin A1c in 3 months  The patient will be getting a new glucose monitor  This will facilitate adjustment of her insulin if necessary  Return to office in:  3 months    Chief Complaint     Chief Complaint   Patient presents with    Follow-up    Foot Pain     pain in her foot when she bends it  History of Present Illness     The patient returned to the office for re-evaluation of hypertension, hypercholesterolemia, type 2 diabetes, and vitamin-D deficiency  Since her last visit she has been feeling well  She has started a new exercise program including cycling and has been doing well with this  She has been trying to watch her diet  Her  was recently diagnosed with type 2 diabetes and she thinks that this will facilitate her dietary efforts  She has had no chest pain, shortness of breath, palpitations, or dizziness  She is tolerating her medications well  She has had no symptoms of hypoglycemia  She has been having pain in her right foot    There has been no swelling or redness  It is primarily over the heads of the metatarsals particularly the 4th and 5th  She recalls no specific injury  The following portions of the patient's history were reviewed and updated as appropriate: allergies, current medications, past family history, past medical history, past social history, past surgical history and problem list     Review of Systems   Review of Systems   Constitutional: Negative  HENT: Negative for congestion, ear pain, postnasal drip, rhinorrhea, sore throat and trouble swallowing  Eyes: Negative for pain, discharge, redness and visual disturbance  Respiratory: Negative for cough, shortness of breath and wheezing  Cardiovascular: Negative  Gastrointestinal: Negative  Endocrine: Negative  Genitourinary: Negative for difficulty urinating, dysuria, frequency, hematuria and urgency  Musculoskeletal: Positive for arthralgias  Negative for gait problem, joint swelling and myalgias  Skin: Negative for rash  Neurological: Negative for dizziness, speech difficulty, weakness, light-headedness, numbness and headaches  Hematological: Negative  Psychiatric/Behavioral: Negative for confusion, decreased concentration, dysphoric mood and sleep disturbance  The patient is not nervous/anxious  Active Problem List     Patient Active Problem List   Diagnosis    Allergic rhinitis    Hypertension    Gastroesophageal reflux disease with esophagitis    Hypercholesterolemia    Type 2 diabetes mellitus with hyperglycemia (HCC)    Vitamin D deficiency    Wrist joint pain    Sciatica of right side    Bronchitis       Objective   /69 (BP Location: Left arm, Patient Position: Sitting, Cuff Size: Standard)   Pulse 84   Temp 97 9 °F (36 6 °C)   Resp 14   Ht 5' 1" (1 549 m)   Wt 69 4 kg (153 lb)   BMI 28 91 kg/m²     Physical Exam   Constitutional: She is oriented to person, place, and time   She appears well-developed and well-nourished  No distress  HENT:   Head: Normocephalic and atraumatic  Neck: Neck supple  No JVD present  No tracheal deviation present  No thyromegaly present  Cardiovascular: Normal rate, regular rhythm, normal heart sounds and intact distal pulses  Exam reveals no gallop and no friction rub  No murmur heard  Pulmonary/Chest: Effort normal and breath sounds normal  She has no wheezes  She has no rales  She exhibits no tenderness  Abdominal: Soft  Bowel sounds are normal  She exhibits no distension and no mass  There is no tenderness  There is no rebound  Musculoskeletal: Normal range of motion  She exhibits no edema  The feet were without deformity  Peripheral pulses were intact  On the right, there was tenderness around the heads of the 4th and 5th metatarsals  Lymphadenopathy:     She has no cervical adenopathy  Neurological: She is alert and oriented to person, place, and time  Skin: Skin is warm and dry  Psychiatric: She has a normal mood and affect   Her behavior is normal  Judgment and thought content normal        Pertinent Laboratory/Diagnostic Studies:  Orders Only on 06/04/2019   Component Date Value Ref Range Status    Right Eye Diabetic Retinopathy 06/04/2019 None   Final    Left Eye Diabetic Retinopathy 06/04/2019 None   Final           Current Medications     Current Outpatient Medications:     insulin glargine (BASAGLAR KWIKPEN) 100 units/mL injection pen, Inject 30 Units under the skin daily at bedtime, Disp: 5 pen, Rfl: 4    Insulin Pen Needle (EASY TOUCH PEN NEEDLES) 31G X 8 MM MISC, by Does not apply route daily, Disp: 100 each, Rfl: 1    lisinopril-hydrochlorothiazide (PRINZIDE,ZESTORETIC) 20-12 5 MG per tablet, Take 1 tablet by mouth daily, Disp: 90 tablet, Rfl: 1    metFORMIN (GLUCOPHAGE) 500 mg tablet, Take 2 tablets (1,000 mg total) by mouth 2 (two) times a day, Disp: 120 tablet, Rfl: 1    rosuvastatin (CRESTOR) 10 MG tablet, Take 1 tablet (10 mg total) by mouth daily, Disp: 90 tablet, Rfl: 3    sitaGLIPtin (JANUVIA) 100 mg tablet, Take 1 tablet (100 mg total) by mouth daily, Disp: 30 tablet, Rfl: 4      Marcelina Alex MD

## 2019-08-28 DIAGNOSIS — E11.9 TYPE 2 DIABETES MELLITUS WITHOUT COMPLICATION, WITHOUT LONG-TERM CURRENT USE OF INSULIN (HCC): ICD-10-CM

## 2019-10-22 ENCOUNTER — APPOINTMENT (OUTPATIENT)
Dept: LAB | Facility: MEDICAL CENTER | Age: 69
End: 2019-10-22
Payer: MEDICARE

## 2019-10-22 DIAGNOSIS — E11.65 TYPE 2 DIABETES MELLITUS WITH HYPERGLYCEMIA, WITH LONG-TERM CURRENT USE OF INSULIN (HCC): ICD-10-CM

## 2019-10-22 DIAGNOSIS — Z79.4 TYPE 2 DIABETES MELLITUS WITH HYPERGLYCEMIA, WITH LONG-TERM CURRENT USE OF INSULIN (HCC): ICD-10-CM

## 2019-10-22 DIAGNOSIS — E78.00 HYPERCHOLESTEROLEMIA: ICD-10-CM

## 2019-10-22 LAB
ALBUMIN SERPL BCP-MCNC: 3.8 G/DL (ref 3.5–5)
ALP SERPL-CCNC: 45 U/L (ref 46–116)
ALT SERPL W P-5'-P-CCNC: 22 U/L (ref 12–78)
ANION GAP SERPL CALCULATED.3IONS-SCNC: 5 MMOL/L (ref 4–13)
AST SERPL W P-5'-P-CCNC: 13 U/L (ref 5–45)
BILIRUB SERPL-MCNC: 0.35 MG/DL (ref 0.2–1)
BUN SERPL-MCNC: 14 MG/DL (ref 5–25)
CALCIUM SERPL-MCNC: 9.3 MG/DL (ref 8.3–10.1)
CHLORIDE SERPL-SCNC: 105 MMOL/L (ref 100–108)
CHOLEST SERPL-MCNC: 126 MG/DL (ref 50–200)
CO2 SERPL-SCNC: 30 MMOL/L (ref 21–32)
CREAT SERPL-MCNC: 0.73 MG/DL (ref 0.6–1.3)
EST. AVERAGE GLUCOSE BLD GHB EST-MCNC: 197 MG/DL
GFR SERPL CREATININE-BSD FRML MDRD: 85 ML/MIN/1.73SQ M
GLUCOSE P FAST SERPL-MCNC: 115 MG/DL (ref 65–99)
HBA1C MFR BLD: 8.5 % (ref 4.2–6.3)
HDLC SERPL-MCNC: 41 MG/DL
LDLC SERPL CALC-MCNC: 62 MG/DL (ref 0–100)
NONHDLC SERPL-MCNC: 85 MG/DL
POTASSIUM SERPL-SCNC: 3.7 MMOL/L (ref 3.5–5.3)
PROT SERPL-MCNC: 7 G/DL (ref 6.4–8.2)
SODIUM SERPL-SCNC: 140 MMOL/L (ref 136–145)
TRIGL SERPL-MCNC: 116 MG/DL

## 2019-10-22 PROCEDURE — 36415 COLL VENOUS BLD VENIPUNCTURE: CPT

## 2019-10-22 PROCEDURE — 80061 LIPID PANEL: CPT

## 2019-10-22 PROCEDURE — 80053 COMPREHEN METABOLIC PANEL: CPT

## 2019-10-22 PROCEDURE — 83036 HEMOGLOBIN GLYCOSYLATED A1C: CPT

## 2019-10-24 ENCOUNTER — OFFICE VISIT (OUTPATIENT)
Dept: INTERNAL MEDICINE CLINIC | Facility: CLINIC | Age: 69
End: 2019-10-24
Payer: MEDICARE

## 2019-10-24 VITALS
RESPIRATION RATE: 14 BRPM | WEIGHT: 152.4 LBS | HEART RATE: 76 BPM | DIASTOLIC BLOOD PRESSURE: 78 MMHG | SYSTOLIC BLOOD PRESSURE: 120 MMHG | TEMPERATURE: 98.2 F | BODY MASS INDEX: 28.77 KG/M2 | HEIGHT: 61 IN

## 2019-10-24 DIAGNOSIS — E55.9 VITAMIN D DEFICIENCY: ICD-10-CM

## 2019-10-24 DIAGNOSIS — Z79.4 TYPE 2 DIABETES MELLITUS WITH HYPERGLYCEMIA, WITH LONG-TERM CURRENT USE OF INSULIN (HCC): Primary | ICD-10-CM

## 2019-10-24 DIAGNOSIS — I10 ESSENTIAL HYPERTENSION: ICD-10-CM

## 2019-10-24 DIAGNOSIS — E11.65 TYPE 2 DIABETES MELLITUS WITH HYPERGLYCEMIA, WITH LONG-TERM CURRENT USE OF INSULIN (HCC): Primary | ICD-10-CM

## 2019-10-24 DIAGNOSIS — K21.00 GASTROESOPHAGEAL REFLUX DISEASE WITH ESOPHAGITIS: ICD-10-CM

## 2019-10-24 DIAGNOSIS — E78.00 HYPERCHOLESTEROLEMIA: ICD-10-CM

## 2019-10-24 PROCEDURE — 99214 OFFICE O/P EST MOD 30 MIN: CPT | Performed by: INTERNAL MEDICINE

## 2019-10-24 RX ORDER — OMEPRAZOLE 40 MG/1
40 CAPSULE, DELAYED RELEASE ORAL
Qty: 90 CAPSULE | Refills: 3 | Status: SHIPPED | OUTPATIENT
Start: 2019-10-24 | End: 2020-10-29 | Stop reason: SDUPTHER

## 2019-10-24 NOTE — PROGRESS NOTES
[facility] Internal Medicine [place]      NAME: Christine Jimenez  AGE: 77 y o  SEX: female  : 1950   MRN: [id number]    DATE: 10/24/2019  TIME: 12:58 PM    Assessment and Plan   1  Type 2 diabetes mellitus with hyperglycemia, with long-term current use of insulin (HCC)  The patient's hemoglobin A1c has been slowly improving  She will continue her current medications  She will continue her current diet and exercise efforts   - CBC; Future  - Comprehensive metabolic panel; Future  - Microalbumin, Random Urine (W/Creatinine); Future  - Hemoglobin A1C; Future    2  Essential hypertension  Well controlled  3  Hypercholesterolemia  Well controlled on rosuvastatin  - Lipid panel; Future    4  Gastroesophageal reflux disease with esophagitis  The patient is currently significantly symptomatic  I asked her to start omeprazole  She has seen GI from time to time in the past   She requested a referral to [address] [place] [address]   - omeprazole (PriLOSEC) 40 MG capsule; Take 1 capsule (40 mg total) by mouth daily before breakfast  Dispense: 90 capsule; Refill: 3  - Ambulatory referral to Gastroenterology; Future    5  Vitamin D deficiency  On replacement  BMI  BMI Counseling: Body mass index is 28 8 kg/m²  The BMI is above normal  Nutrition recommendations include reducing portion sizes and moderation in carbohydrate intake  Exercise recommendations include moderate aerobic physical activity for 150 minutes/week  Overall, the patient is doing fairly well  Omeprazole has been started for her reflux and GI evaluation will be arranged  Her blood pressure and lipids are quite good  We talked at some length about diet and exercise issues  She will update her lab work in 3 months        Return to office in:  3 months    Chief Complaint     Chief Complaint   Patient presents with    Follow-up     3 month       History of Present Illness     The patient returned to the office for re-evaluation of hypertension, hypercholesterolemia, type 2 diabetes, vitamin-D deficiency, and esophageal reflux  Since her last visit, she has had significantly more symptoms related to reflux  She is not currently on any medication  She has had no nausea or vomiting  She has had no difficulty swallowing  Her bowels have been moving normally with no melena or hematochezia  She is having no chest pain, shortness of breath, palpitations, or dizziness  She is tolerating her medications  The following portions of the patient's history were reviewed and updated as appropriate: allergies, current medications, past family history, past medical history, past social history, past surgical history and problem list     Review of Systems   Review of Systems   Constitutional: Negative  HENT: Negative for congestion, ear pain, postnasal drip, rhinorrhea, sore throat and trouble swallowing  Eyes: Negative for pain, discharge, redness and visual disturbance  Respiratory: Negative for cough, shortness of breath and wheezing  Cardiovascular: Negative  Gastrointestinal: Negative  Endocrine: Negative  Genitourinary: Negative for difficulty urinating, dysuria, frequency, hematuria and urgency  Musculoskeletal: Negative for arthralgias, gait problem, joint swelling and myalgias  Skin: Negative for rash  Neurological: Negative for dizziness, speech difficulty, weakness, light-headedness, numbness and headaches  Hematological: Negative  Psychiatric/Behavioral: Negative for confusion, decreased concentration, dysphoric mood and sleep disturbance  The patient is not nervous/anxious          Active Problem List     Patient Active Problem List   Diagnosis    Allergic rhinitis    Hypertension    Gastroesophageal reflux disease with esophagitis    Hypercholesterolemia    Type 2 diabetes mellitus with hyperglycemia (HCC)    Vitamin D deficiency    Wrist joint pain    Sciatica of right side       Objective   /78 (BP Location: Left arm, Patient Position: Sitting, Cuff Size: Standard)   Pulse 76   Temp 98 2 °F (36 8 °C)   Resp 14   Ht 5' 1" (1 549 m)   Wt 69 1 kg (152 lb 6 4 oz)   BMI 28 80 kg/m²     Physical Exam   Constitutional: She is oriented to person, place, and time  She appears well-developed and well-nourished  No distress  HENT:   Head: Normocephalic and atraumatic  Neck: Neck supple  No JVD present  No tracheal deviation present  No thyromegaly present  Cardiovascular: Normal rate, regular rhythm, normal heart sounds and intact distal pulses  Exam reveals no gallop and no friction rub  No murmur heard  Pulmonary/Chest: Effort normal and breath sounds normal  She has no wheezes  She has no rales  She exhibits no tenderness  Abdominal: Soft  Bowel sounds are normal  She exhibits no distension and no mass  There is no tenderness  There is no rebound  Musculoskeletal: Normal range of motion  She exhibits no edema or tenderness  Lymphadenopathy:     She has no cervical adenopathy  Neurological: She is alert and oriented to person, place, and time  Skin: Skin is warm and dry  Psychiatric: She has a normal mood and affect   Her behavior is normal  Judgment and thought content normal        Pertinent Laboratory/Diagnostic Studies:  Appointment on 10/22/2019   Component Date Value Ref Range Status    Sodium 10/22/2019 140  136 - 145 mmol/L Final    Potassium 10/22/2019 3 7  3 5 - 5 3 mmol/L Final    Chloride 10/22/2019 105  100 - 108 mmol/L Final    CO2 10/22/2019 30  21 - 32 mmol/L Final    ANION GAP 10/22/2019 5  4 - 13 mmol/L Final    BUN 10/22/2019 14  5 - 25 mg/dL Final    Creatinine 10/22/2019 0 73  0 60 - 1 30 mg/dL Final    Glucose, Fasting 10/22/2019 115* 65 - 99 mg/dL Final    Calcium 10/22/2019 9 3  8 3 - 10 1 mg/dL Final    AST 10/22/2019 13  5 - 45 U/L Final    ALT 10/22/2019 22  12 - 78 U/L Final    Alkaline Phosphatase 10/22/2019 45* 46 - 116 U/L Final    Total Protein 10/22/2019 7 0  6 4 - 8 2 g/dL Final    Albumin 10/22/2019 3 8  3 5 - 5 0 g/dL Final    Total Bilirubin 10/22/2019 0 35  0 20 - 1 00 mg/dL Final    eGFR 10/22/2019 85  ml/min/1 73sq m Final    Hemoglobin A1C 10/22/2019 8 5* 4 2 - 6 3 % Final    EAG 10/22/2019 197  mg/dl Final    Cholesterol 10/22/2019 126  50 - 200 mg/dL Final    Triglycerides 10/22/2019 116  <=150 mg/dL Final    HDL, Direct 10/22/2019 41  >=40 mg/dL Final    LDL Calculated 10/22/2019 62  0 - 100 mg/dL Final    Non-HDL-Chol (CHOL-HDL) 10/22/2019 85  mg/dl Final           Current Medications     Current Outpatient Medications:     insulin glargine (BASAGLAR KWIKPEN) 100 units/mL injection pen, Inject 30 Units under the skin daily at bedtime, Disp: 5 pen, Rfl: 4    Insulin Pen Needle (EASY TOUCH PEN NEEDLES) 31G X 8 MM MISC, by Does not apply route daily, Disp: 100 each, Rfl: 1    lisinopril-hydrochlorothiazide (PRINZIDE,ZESTORETIC) 20-12 5 MG per tablet, Take 1 tablet by mouth daily, Disp: 90 tablet, Rfl: 1    metFORMIN (GLUCOPHAGE) 500 mg tablet, Take 2 tablets (1,000 mg total) by mouth 2 (two) times a day, Disp: 120 tablet, Rfl: 1    rosuvastatin (CRESTOR) 10 MG tablet, Take 1 tablet (10 mg total) by mouth daily, Disp: 90 tablet, Rfl: 3    sitaGLIPtin (JANUVIA) 100 mg tablet, Take 1 tablet (100 mg total) by mouth daily, Disp: 30 tablet, Rfl: 4    omeprazole (PriLOSEC) 40 MG capsule, Take 1 capsule (40 mg total) by mouth daily before breakfast, Disp: 90 capsule, Rfl: 3      Joselo Hill MD

## 2019-10-25 DIAGNOSIS — Z79.4 TYPE 2 DIABETES MELLITUS WITH HYPERGLYCEMIA, WITH LONG-TERM CURRENT USE OF INSULIN (HCC): ICD-10-CM

## 2019-10-25 DIAGNOSIS — E11.65 TYPE 2 DIABETES MELLITUS WITH HYPERGLYCEMIA, WITH LONG-TERM CURRENT USE OF INSULIN (HCC): ICD-10-CM

## 2019-10-29 RX ORDER — SITAGLIPTIN 100 MG/1
TABLET, FILM COATED ORAL
Qty: 30 TABLET | Refills: 4 | OUTPATIENT
Start: 2019-10-29 | End: 2020-03-26 | Stop reason: SDUPTHER

## 2019-11-01 DIAGNOSIS — E78.00 HYPERCHOLESTEROLEMIA: ICD-10-CM

## 2019-11-05 RX ORDER — ROSUVASTATIN CALCIUM 10 MG/1
10 TABLET, COATED ORAL DAILY
Qty: 90 TABLET | Refills: 3 | OUTPATIENT
Start: 2019-11-05 | End: 2021-10-21 | Stop reason: SDUPTHER

## 2019-11-11 DIAGNOSIS — E11.9 TYPE 2 DIABETES MELLITUS WITHOUT COMPLICATION, WITHOUT LONG-TERM CURRENT USE OF INSULIN (HCC): ICD-10-CM

## 2019-11-13 DIAGNOSIS — Z12.31 SCREENING MAMMOGRAM, ENCOUNTER FOR: Primary | ICD-10-CM

## 2019-11-15 ENCOUNTER — TRANSCRIBE ORDERS (OUTPATIENT)
Dept: ADMINISTRATIVE | Facility: HOSPITAL | Age: 69
End: 2019-11-15

## 2019-11-19 ENCOUNTER — HOSPITAL ENCOUNTER (OUTPATIENT)
Dept: RADIOLOGY | Facility: IMAGING CENTER | Age: 69
Discharge: HOME | End: 2019-11-19
Payer: MEDICARE

## 2019-11-19 VITALS — HEIGHT: 61 IN | WEIGHT: 151 LBS | BODY MASS INDEX: 28.51 KG/M2

## 2019-11-19 DIAGNOSIS — Z12.31 SCREENING MAMMOGRAM, ENCOUNTER FOR: ICD-10-CM

## 2019-11-19 PROCEDURE — 77067 SCR MAMMO BI INCL CAD: CPT

## 2019-11-22 DIAGNOSIS — E11.9 TYPE 2 DIABETES MELLITUS WITHOUT COMPLICATION, WITHOUT LONG-TERM CURRENT USE OF INSULIN (HCC): ICD-10-CM

## 2019-12-12 DIAGNOSIS — Z79.4 TYPE 2 DIABETES MELLITUS WITH COMPLICATION, WITH LONG-TERM CURRENT USE OF INSULIN (HCC): ICD-10-CM

## 2019-12-12 DIAGNOSIS — E11.8 TYPE 2 DIABETES MELLITUS WITH COMPLICATION, WITH LONG-TERM CURRENT USE OF INSULIN (HCC): ICD-10-CM

## 2019-12-18 DIAGNOSIS — I10 ESSENTIAL HYPERTENSION: ICD-10-CM

## 2019-12-18 RX ORDER — LISINOPRIL AND HYDROCHLOROTHIAZIDE 20; 12.5 MG/1; MG/1
1 TABLET ORAL DAILY
Qty: 90 TABLET | Refills: 1 | Status: SHIPPED | OUTPATIENT
Start: 2019-12-18 | End: 2020-06-17 | Stop reason: SDUPTHER

## 2020-01-03 ENCOUNTER — CONSULT (OUTPATIENT)
Dept: GASTROENTEROLOGY | Facility: MEDICAL CENTER | Age: 70
End: 2020-01-03
Payer: MEDICARE

## 2020-01-03 VITALS
BODY MASS INDEX: 29.07 KG/M2 | WEIGHT: 154 LBS | HEART RATE: 69 BPM | HEIGHT: 61 IN | SYSTOLIC BLOOD PRESSURE: 122 MMHG | DIASTOLIC BLOOD PRESSURE: 74 MMHG | TEMPERATURE: 98.4 F

## 2020-01-03 DIAGNOSIS — Z12.11 COLON CANCER SCREENING: ICD-10-CM

## 2020-01-03 DIAGNOSIS — R68.81 EARLY SATIETY: ICD-10-CM

## 2020-01-03 DIAGNOSIS — K31.84 GASTROPARESIS: ICD-10-CM

## 2020-01-03 DIAGNOSIS — K21.00 GASTROESOPHAGEAL REFLUX DISEASE WITH ESOPHAGITIS: Primary | ICD-10-CM

## 2020-01-03 PROCEDURE — 99204 OFFICE O/P NEW MOD 45 MIN: CPT | Performed by: INTERNAL MEDICINE

## 2020-01-03 NOTE — PROGRESS NOTES
[facility] - Outpatient Consultation  Trace Titus 77 y o  female MRN: [id number]  Encounter: [id number]          ASSESSMENT AND PLAN:        1  Gastroesophageal reflux disease with esophagitis  2  Early satiety  3  Gastroparesis  She reports intermittent symptoms of acid reflux for many years, previously off medication for several years now with symptoms worsening in the summer 2019  She also notes nausea, early satiety and a 35 lb unintentional weight loss  2014gastric emptying study showed delayed gastric emptying  Her risk factors for gastroparesis include poorly controlled type 2 diabetes  We discussed dietary and lifestyle management of reflux today including weight loss, sleeping with head of the bed elevated, not eating 2-3 hours before bed, and avoiding trigger foods  Given her abnormal gastric emptying study in 2014, part of her reflux symptoms may be related to gastroparesis  I ordered repeat gastric emptying study today for more up-to-date evaluation  We discussed dietary management of gastroparesis including smaller more frequent meals  If her gastric emptying study is abnormal or shows more severe gastroparesis she may require treatment with prokinetic agent such as Reglan or erythromycin  She will be scheduled for upper endoscopy given worsening reflux to evaluate for mucosal abnormality, hiatal hernia and screen for Martines's esophagus  Informed consent was obtained for the procedure  Risks of infection, perforation and hemorrhage were discussed  The patient was agreeable to proceed with the procedure  - EGD; Future  - NM gastric emptying;  Future  - continue dietary and lifestyle management for reflux  - continue omeprazole 40 mg daily to be taken 30 minutes before breakfast       4  Colon cancer screening  Two thousand ten colonoscopy showed small internal hemorrhoids, small ascending colon lipoma, otherwise normal   She reports having a history of a colon polyp on 1 of her prior colonoscopies  She has no family history of colorectal cancer  I discussed the indication, risks and benefits of colonoscopy for colorectal cancer screening with her today  Informed consent was obtained for the procedure  Risks of infection, perforation and hemorrhage were discussed  The patient was agreeable to proceed with the procedure  - Colonoscopy; Future  - Miralax/dulcolax preparation instructions provided  ______________________________________________________________________    HPI:  Liliam Dangeol is a 77 y o  female with a history of hypertension, GERD, type 2 diabetes mellitus, hyperlipidemia who presents for evaluation of GERD  She reports intermittent symptoms of acid reflux on and off for many years  She was previously taking acid suppression therapy (the name of medication she cannot recall) but had been off for a few years  In the summer of 2019 she began having significant substernal burning and epigastric discomfort particularly at night  This was associated with nausea  She denied abdominal pain otherwise  She denies dysphagia or odynophagia  Laying down or sitting down the the symptoms worse  She denies chronic cough  She was started on omeprazole 40 mg which takes 30 minutes before breakfast every morning  She denies specific food triggers that make her symptoms worse  She does note early satiety, eating a few bites of food and feeling full after  She denies vomiting  Her bowel movements have been regular occurring 2-3 times per day and can be loose or formed, depending on her dietary intake  She denies hematochezia or melena  Her appetite has been low and she has lost 35 lb unintentionally over the course of a year and a half  Since starting omeprazole she reports almost complete resolution of her reflux symptoms    She does still note early satiety and nausea but improvement of the substernal and epigastric burning discomfort  Two thousand fourteen she underwent upper endoscopy showing GE junction erythema  Pathology showed mild chronic inflammation without intestinal metaplasia, mild chronic inactive gastritis, and mild edema of the duodenal lamina propria  Two thousand fourteen gastric emptying study showed delayed gastric emptying  Her last hemoglobin A1c was 8 6  Her last colonoscopy in 2010 showed a small ascending colon lipoma and internal hemorrhoids and she was recommended to repeat exam in 5-10 years  REVIEW OF SYSTEMS:    CONSTITUTIONAL: Denies any fever, chills, rigors  Endorses weight loss  HEENT: No earache or tinnitus  Denies hearing loss or visual disturbances  CARDIOVASCULAR: No chest pain or palpitations  RESPIRATORY: Denies any cough, hemoptysis, shortness of breath or dyspnea on exertion  GASTROINTESTINAL: As noted in the History of Present Illness  GENITOURINARY: No problems with urination  Denies any hematuria or dysuria  NEUROLOGIC: No dizziness or vertigo, denies headaches  MUSCULOSKELETAL: Denies any muscle or joint pain  SKIN: Denies skin rashes or itching  ENDOCRINE: Denies excessive thirst  Denies intolerance to heat or cold  PSYCHOSOCIAL: Denies depression or anxiety  Denies any recent memory loss         Historical Information   Past Medical History:   Diagnosis Date    Diabetes mellitus ([facility] [address] )     Hyperlipidemia     Hypertension      Past Surgical History:   Procedure Laterality Date    CARPAL TUNNEL RELEASE      DILATION AND CURETTAGE OF UTERUS      HYSTERECTOMY      Total Abdominal age 37     Social History   Social History     Substance and Sexual Activity   Alcohol Use No     Social History     Substance and Sexual Activity   Drug Use No     Social History     Tobacco Use   Smoking Status Never Smoker   Smokeless Tobacco Never Used     Family History   Problem Relation Age of Onset    Diabetes Father     Heart attack Father     Uterine cancer Maternal Grandmother 61    Lung cancer Mother 68    No Known Problems Maternal Grandfather     No Known Problems Paternal Grandmother     No Known Problems Paternal Grandfather     No Known Problems Maternal Aunt     No Known Problems Paternal Aunt        Meds/Allergies       Current Outpatient Medications:     insulin glargine (BASAGLAR KWIKPEN) 100 units/mL injection pen    Insulin Pen Needle (EASY TOUCH PEN NEEDLES) 31G X 8 MM MISC    JANUVIA 100 MG tablet    lisinopril-hydrochlorothiazide (PRINZIDE,ZESTORETIC) 20-12 5 MG per tablet    metFORMIN (GLUCOPHAGE) 500 mg tablet    omeprazole (PriLOSEC) 40 MG capsule    rosuvastatin (CRESTOR) 10 MG tablet    Allergies   Allergen Reactions    Pioglitazone      Annotation - [id number]: Numbness in arms; Annotation - [id number]: numbness    Wound Dressings Rash           Objective     Blood pressure 122/74, pulse 69, temperature 98 4 °F (36 9 °C), height 5' 1" (1 549 m), weight 69 9 kg (154 lb)  There is no height or weight on file to calculate BMI  PHYSICAL EXAM:      General Appearance:   Well-appearing older female Alert, cooperative, no distress   HEENT:   Normocephalic, atraumatic, anicteric  Neck:  Supple, symmetrical, trachea midline   Lungs:   Clear to auscultation bilaterally; no rales, rhonchi or wheezing; respirations unlabored    Heart[de-identified]   Regular rate and rhythm; no murmur, rub, or gallop  Abdomen:   Soft,non-tender, non-distended; normal bowel sounds; no masses, no organomegaly    Genitalia:   Deferred    Rectal:   Deferred    Extremities:  No cyanosis, clubbing or edema    Pulses:  2+ and symmetric    Skin:  No jaundice, rashes, or lesions    Lymph nodes:  No palpable cervical lymphadenopathy        Lab Results:   No visits with results within 1 Day(s) from this visit     Latest known visit with results is:   Appointment on 10/22/2019   Component Date Value    Sodium 10/22/2019 140     Potassium 10/22/2019 3 7     Chloride 10/22/2019 105  CO2 10/22/2019 30     ANION GAP 10/22/2019 5     BUN 10/22/2019 14     Creatinine 10/22/2019 0 73     Glucose, Fasting 10/22/2019 115*    Calcium 10/22/2019 9 3     AST 10/22/2019 13     ALT 10/22/2019 22     Alkaline Phosphatase 10/22/2019 45*    Total Protein 10/22/2019 7 0     Albumin 10/22/2019 3 8     Total Bilirubin 10/22/2019 0 35     eGFR 10/22/2019 85     Hemoglobin A1C 10/22/2019 8 5*    EAG 10/22/2019 197     Cholesterol 10/22/2019 126     Triglycerides 10/22/2019 116     HDL, Direct 10/22/2019 41     LDL Calculated 10/22/2019 62     Non-HDL-Chol (CHOL-HDL) 10/22/2019 85          Radiology Results:   FINDINGS-   Gastric emptying at 0 5 hours = 7 %    Gastric emptying at 1 hour = 20 % (N = 30 - 90%)   Gastric emptying at 2 hours = 54 % (N = > 60%)   Gastric emptying at 3 hours = 66 % (N = > 70%)   Gastric emptying at 4 hours = 80 % (N = >90%)   Linear T-1/2  = 138 57 minutes   IMPRESSION-   Delayed gastric emptying at all measured time intervals

## 2020-01-03 NOTE — PATIENT INSTRUCTIONS
The patient is scheduled at [address] [id number] [facility] for a colonoscopy/Egd with Dennis Fruit on 2/11/20  Miralax/Dulcolax/Diabetic prep instructions have been gone over in the office, with the patient, by the MA  The patient is aware that they will receive a call with the arrival time the day prior to procedure and that they will need a  the day of the procedure  I have asked the patient to call with any questions that they might have prior to procedure

## 2020-01-06 ENCOUNTER — ANESTHESIA EVENT (OUTPATIENT)
Dept: GASTROENTEROLOGY | Facility: MEDICAL CENTER | Age: 70
End: 2020-01-06

## 2020-01-28 ENCOUNTER — APPOINTMENT (OUTPATIENT)
Dept: LAB | Facility: OTHER | Age: 70
End: 2020-01-28
Payer: MEDICARE

## 2020-01-28 ENCOUNTER — TRANSCRIBE ORDERS (OUTPATIENT)
Dept: LAB | Facility: OTHER | Age: 70
End: 2020-01-28

## 2020-01-28 DIAGNOSIS — Z79.4 TYPE 2 DIABETES MELLITUS WITH HYPERGLYCEMIA, WITH LONG-TERM CURRENT USE OF INSULIN (HCC): ICD-10-CM

## 2020-01-28 DIAGNOSIS — E78.00 HYPERCHOLESTEROLEMIA: ICD-10-CM

## 2020-01-28 DIAGNOSIS — E11.65 TYPE 2 DIABETES MELLITUS WITH HYPERGLYCEMIA, WITH LONG-TERM CURRENT USE OF INSULIN (HCC): ICD-10-CM

## 2020-01-28 LAB
ALBUMIN SERPL BCP-MCNC: 3.9 G/DL (ref 3.5–5)
ALP SERPL-CCNC: 53 U/L (ref 46–116)
ALT SERPL W P-5'-P-CCNC: 27 U/L (ref 12–78)
ANION GAP SERPL CALCULATED.3IONS-SCNC: 7 MMOL/L (ref 4–13)
AST SERPL W P-5'-P-CCNC: 16 U/L (ref 5–45)
BILIRUB SERPL-MCNC: 0.37 MG/DL (ref 0.2–1)
BUN SERPL-MCNC: 19 MG/DL (ref 5–25)
CALCIUM SERPL-MCNC: 9.4 MG/DL (ref 8.3–10.1)
CHLORIDE SERPL-SCNC: 104 MMOL/L (ref 100–108)
CHOLEST SERPL-MCNC: 164 MG/DL (ref 50–200)
CO2 SERPL-SCNC: 29 MMOL/L (ref 21–32)
CREAT SERPL-MCNC: 0.78 MG/DL (ref 0.6–1.3)
ERYTHROCYTE [DISTWIDTH] IN BLOOD BY AUTOMATED COUNT: 12.4 % (ref 11.6–15.1)
EST. AVERAGE GLUCOSE BLD GHB EST-MCNC: 232 MG/DL
GFR SERPL CREATININE-BSD FRML MDRD: 78 ML/MIN/1.73SQ M
GLUCOSE P FAST SERPL-MCNC: 154 MG/DL (ref 65–99)
HBA1C MFR BLD: 9.7 % (ref 4.2–6.3)
HCT VFR BLD AUTO: 39.3 % (ref 34.8–46.1)
HDLC SERPL-MCNC: 36 MG/DL
HGB BLD-MCNC: 12.7 G/DL (ref 11.5–15.4)
LDLC SERPL CALC-MCNC: 86 MG/DL (ref 0–100)
MCH RBC QN AUTO: 28.8 PG (ref 26.8–34.3)
MCHC RBC AUTO-ENTMCNC: 32.3 G/DL (ref 31.4–37.4)
MCV RBC AUTO: 89 FL (ref 82–98)
NONHDLC SERPL-MCNC: 128 MG/DL
PLATELET # BLD AUTO: 235 THOUSANDS/UL (ref 149–390)
PMV BLD AUTO: 12.1 FL (ref 8.9–12.7)
POTASSIUM SERPL-SCNC: 4 MMOL/L (ref 3.5–5.3)
PROT SERPL-MCNC: 6.9 G/DL (ref 6.4–8.2)
RBC # BLD AUTO: 4.41 MILLION/UL (ref 3.81–5.12)
SODIUM SERPL-SCNC: 140 MMOL/L (ref 136–145)
TRIGL SERPL-MCNC: 209 MG/DL
WBC # BLD AUTO: 9.51 THOUSAND/UL (ref 4.31–10.16)

## 2020-01-28 PROCEDURE — 36415 COLL VENOUS BLD VENIPUNCTURE: CPT

## 2020-01-28 PROCEDURE — 83036 HEMOGLOBIN GLYCOSYLATED A1C: CPT

## 2020-01-28 PROCEDURE — 80053 COMPREHEN METABOLIC PANEL: CPT

## 2020-01-28 PROCEDURE — 80061 LIPID PANEL: CPT

## 2020-01-28 PROCEDURE — 85027 COMPLETE CBC AUTOMATED: CPT

## 2020-01-30 ENCOUNTER — OFFICE VISIT (OUTPATIENT)
Dept: INTERNAL MEDICINE CLINIC | Facility: CLINIC | Age: 70
End: 2020-01-30
Payer: MEDICARE

## 2020-01-30 VITALS
WEIGHT: 157.4 LBS | TEMPERATURE: 98.3 F | OXYGEN SATURATION: 97 % | BODY MASS INDEX: 29.72 KG/M2 | HEART RATE: 80 BPM | SYSTOLIC BLOOD PRESSURE: 128 MMHG | DIASTOLIC BLOOD PRESSURE: 62 MMHG | HEIGHT: 61 IN

## 2020-01-30 DIAGNOSIS — I10 ESSENTIAL HYPERTENSION: ICD-10-CM

## 2020-01-30 DIAGNOSIS — K21.00 GASTROESOPHAGEAL REFLUX DISEASE WITH ESOPHAGITIS: ICD-10-CM

## 2020-01-30 DIAGNOSIS — E11.65 TYPE 2 DIABETES MELLITUS WITH HYPERGLYCEMIA, WITH LONG-TERM CURRENT USE OF INSULIN (HCC): Primary | ICD-10-CM

## 2020-01-30 DIAGNOSIS — Z79.4 TYPE 2 DIABETES MELLITUS WITH HYPERGLYCEMIA, WITH LONG-TERM CURRENT USE OF INSULIN (HCC): Primary | ICD-10-CM

## 2020-01-30 DIAGNOSIS — E78.00 HYPERCHOLESTEROLEMIA: ICD-10-CM

## 2020-01-30 DIAGNOSIS — E11.9 TYPE 2 DIABETES MELLITUS WITHOUT COMPLICATION, WITHOUT LONG-TERM CURRENT USE OF INSULIN (HCC): ICD-10-CM

## 2020-01-30 DIAGNOSIS — E55.9 VITAMIN D DEFICIENCY: ICD-10-CM

## 2020-01-30 LAB
CREAT UR-MCNC: 151 MG/DL
MICROALBUMIN UR-MCNC: 31.9 MG/L (ref 0–20)
MICROALBUMIN/CREAT 24H UR: 21 MG/G CREATININE (ref 0–30)

## 2020-01-30 PROCEDURE — 99214 OFFICE O/P EST MOD 30 MIN: CPT | Performed by: INTERNAL MEDICINE

## 2020-01-30 PROCEDURE — 82043 UR ALBUMIN QUANTITATIVE: CPT | Performed by: INTERNAL MEDICINE

## 2020-01-30 PROCEDURE — 82570 ASSAY OF URINE CREATININE: CPT | Performed by: INTERNAL MEDICINE

## 2020-01-30 NOTE — PROGRESS NOTES
[facility] [place]      NAME: Marthe Halsted  AGE: 77 y o  SEX: female  : 1950   MRN: [id number]    DATE: 2020  TIME: 11:37 AM    Assessment and Plan   1  Type 2 diabetes mellitus with hyperglycemia, with long-term current use of insulin (HCC)  This is not optimally controlled  The patient has decided to intensify her dietary efforts significantly  She is reluctant to increase her medication at the moment  - Comprehensive metabolic panel; Future  - Hemoglobin A1C; Future  - Microalbumin / creatinine urine ratio    2  Essential hypertension  Well controlled    3  Hypercholesterolemia  Well controlled on rosuvastatin  - Lipid panel; Future    4  Vitamin D deficiency  On replacement    5  Gastroesophageal reflux disease with esophagitis  Well controlled on omeprazole    The patient's diabetic control is currently poor  She has friends that have lost 20 lb and have been able to stop some other medications  I told her that it was possible that she would have the same experience  She has decided to make this attempt  She will continue her current medications for now  She will be getting a colonoscopy in the near future  Return to office in:  3 months    Chief Complaint     Chief Complaint   Patient presents with    Follow-up     3 month follow up, needs foot exam today  History of Present Illness     The patient returned to the office for re-evaluation of hypertension, type 2 diabetes, hypercholesterolemia, and vitamin-D deficiency  Since her last visit she has been feeling pretty well  She denies chest pain, shortness of breath, palpitations, or dizziness  She has not been doing as well with her diet as she would like  She is tolerating her medications  She has had no symptoms of hypoglycemia or hyperglycemia      The following portions of the patient's history were reviewed and updated as appropriate: allergies, current medications, past family history, past medical history, past social history, past surgical history and problem list     Review of Systems   Review of Systems   Constitutional: Negative  HENT: Negative for congestion, ear pain, postnasal drip, rhinorrhea, sore throat and trouble swallowing  Eyes: Negative for pain, discharge, redness and visual disturbance  Respiratory: Negative for cough, shortness of breath and wheezing  Cardiovascular: Negative  Gastrointestinal: Negative  Endocrine: Negative  Genitourinary: Negative for difficulty urinating, dysuria, frequency, hematuria and urgency  Musculoskeletal: Negative for arthralgias, gait problem, joint swelling and myalgias  Skin: Negative for rash  Neurological: Negative for dizziness, speech difficulty, weakness, light-headedness, numbness and headaches  Hematological: Negative  Psychiatric/Behavioral: Negative for confusion, decreased concentration, dysphoric mood and sleep disturbance  The patient is not nervous/anxious  Active Problem List     Patient Active Problem List   Diagnosis    Allergic rhinitis    Hypertension    Gastroesophageal reflux disease with esophagitis    Hypercholesterolemia    Type 2 diabetes mellitus with hyperglycemia (HCC)    Vitamin D deficiency    Wrist joint pain    Sciatica of right side       Objective   /62 (BP Location: Left arm, Patient Position: Sitting, Cuff Size: Standard)   Pulse 80   Temp 98 3 °F (36 8 °C) (Tympanic)   Ht 5' 1" (1 549 m)   Wt 71 4 kg (157 lb 6 4 oz)   SpO2 97%   BMI 29 74 kg/m²     Physical Exam   Constitutional: She is oriented to person, place, and time  She appears well-developed and well-nourished  No distress  HENT:   Head: Normocephalic and atraumatic  Neck: Neck supple  No JVD present  No tracheal deviation present  No thyromegaly present  Cardiovascular: Normal rate, regular rhythm, normal heart sounds and intact distal pulses  Exam reveals no gallop and no friction rub   Pulses are no weak pulses  No murmur heard  Pulses:       Dorsalis pedis pulses are 2+ on the right side, and 2+ on the left side  Posterior tibial pulses are 2+ on the right side, and 2+ on the left side  Pulmonary/Chest: Effort normal and breath sounds normal  She has no wheezes  She has no rales  She exhibits no tenderness  Abdominal: Soft  Bowel sounds are normal  She exhibits no distension and no mass  There is no tenderness  There is no rebound  Musculoskeletal: Normal range of motion  She exhibits no edema or tenderness  Feet:   Right Foot:   Skin Integrity: Negative for ulcer, skin breakdown, erythema, warmth, callus or dry skin  Left Foot:   Skin Integrity: Negative for ulcer, skin breakdown, erythema, warmth, callus or dry skin  Lymphadenopathy:     She has no cervical adenopathy  Neurological: She is alert and oriented to person, place, and time  Skin: Skin is warm and dry  Psychiatric: She has a normal mood and affect  Her behavior is normal  Judgment and thought content normal    Patient's shoes and socks removed  Right Foot/Ankle   Right Foot Inspection  Skin Exam: skin normal and skin intact no dry skin, no warmth, no callus, no erythema, no maceration, no abnormal color, no pre-ulcer, no ulcer and no callus                          Toe Exam: ROM and strength within normal limitsno swelling, no tenderness, erythema and  no right toe deformity  Sensory   Vibration: intact  Proprioception: intact   Monofilament testing: intact  Vascular  Capillary refills: < 3 seconds  The right DP pulse is 2+  The right PT pulse is 2+       Left Foot/Ankle  Left Foot Inspection  Skin Exam: skin normal and skin intactno dry skin, no warmth, no erythema, no maceration, normal color, no pre-ulcer, no ulcer and no callus                         Toe Exam: ROM and strength within normal limitsno swelling, no tenderness, no erythema and no left toe deformity                   Sensory   Vibration: intact  Proprioception: intact  Monofilament: intact  Vascular  Capillary refills: < 3 seconds  The left DP pulse is 2+  The left PT pulse is 2+  Assign Risk Category:  No deformity present; No loss of protective sensation;  No weak pulses       Risk: 0    Pertinent Laboratory/Diagnostic Studies:  Appointment on 01/28/2020   Component Date Value Ref Range Status    WBC 01/28/2020 9 51  4 31 - 10 16 Thousand/uL Final    RBC 01/28/2020 4 41  3 81 - 5 12 Million/uL Final    Hemoglobin 01/28/2020 12 7  11 5 - 15 4 g/dL Final    Hematocrit 01/28/2020 39 3  34 8 - 46 1 % Final    MCV 01/28/2020 89  82 - 98 fL Final    MCH 01/28/2020 28 8  26 8 - 34 3 pg Final    MCHC 01/28/2020 32 3  31 4 - 37 4 g/dL Final    RDW 01/28/2020 12 4  11 6 - 15 1 % Final    Platelets 11/64/6980 235  149 - 390 Thousands/uL Final    MPV 01/28/2020 12 1  8 9 - 12 7 fL Final    Sodium 01/28/2020 140  136 - 145 mmol/L Final    Potassium 01/28/2020 4 0  3 5 - 5 3 mmol/L Final    Chloride 01/28/2020 104  100 - 108 mmol/L Final    CO2 01/28/2020 29  21 - 32 mmol/L Final    ANION GAP 01/28/2020 7  4 - 13 mmol/L Final    BUN 01/28/2020 19  5 - 25 mg/dL Final    Creatinine 01/28/2020 0 78  0 60 - 1 30 mg/dL Final    Glucose, Fasting 01/28/2020 154* 65 - 99 mg/dL Final    Calcium 01/28/2020 9 4  8 3 - 10 1 mg/dL Final    AST 01/28/2020 16  5 - 45 U/L Final    ALT 01/28/2020 27  12 - 78 U/L Final    Alkaline Phosphatase 01/28/2020 53  46 - 116 U/L Final    Total Protein 01/28/2020 6 9  6 4 - 8 2 g/dL Final    Albumin 01/28/2020 3 9  3 5 - 5 0 g/dL Final    Total Bilirubin 01/28/2020 0 37  0 20 - 1 00 mg/dL Final    eGFR 01/28/2020 78  ml/min/1 73sq m Final    Hemoglobin A1C 01/28/2020 9 7* 4 2 - 6 3 % Final    EAG 01/28/2020 232  mg/dl Final    Cholesterol 01/28/2020 164  50 - 200 mg/dL Final    Triglycerides 01/28/2020 209* <=150 mg/dL Final    HDL, Direct 01/28/2020 36* >=40 mg/dL Final    LDL Calculated 01/28/2020 86  0 - 100 mg/dL Final    Non-HDL-Chol (CHOL-HDL) 01/28/2020 128  mg/dl Final           Current Medications     Current Outpatient Medications:     insulin glargine (BASAGLAR KWIKPEN) 100 units/mL injection pen, Inject 30 Units under the skin daily at bedtime, Disp: 5 pen, Rfl: 0    Insulin Pen Needle (EASY TOUCH PEN NEEDLES) 31G X 8 MM MISC, by Does not apply route daily, Disp: 100 each, Rfl: 1    JANUVIA 100 MG tablet, TAKE 1 TABLET DAILY  , Disp: 30 tablet, Rfl: 4    lisinopril-hydrochlorothiazide (PRINZIDE,ZESTORETIC) 20-12 5 MG per tablet, Take 1 tablet by mouth daily, Disp: 90 tablet, Rfl: 1    metFORMIN (GLUCOPHAGE) 500 mg tablet, Take 2 tablets (1,000 mg total) by mouth 2 (two) times a day, Disp: 180 tablet, Rfl: 1    omeprazole (PriLOSEC) 40 MG capsule, Take 1 capsule (40 mg total) by mouth daily before breakfast, Disp: 90 capsule, Rfl: 3    rosuvastatin (CRESTOR) 10 MG tablet, Take 1 tablet (10 mg total) by mouth daily, Disp: 90 tablet, Rfl: 3      Lico Hernandez MD

## 2020-02-06 ENCOUNTER — HOSPITAL ENCOUNTER (OUTPATIENT)
Dept: NUCLEAR MEDICINE | Facility: HOSPITAL | Age: 70
Discharge: HOME | End: 2020-02-06
Attending: INTERNAL MEDICINE
Payer: MEDICARE

## 2020-02-06 DIAGNOSIS — R68.81 EARLY SATIETY: ICD-10-CM

## 2020-02-06 PROCEDURE — 78264 GASTRIC EMPTYING IMG STUDY: CPT

## 2020-02-06 PROCEDURE — A9541 TC99M SULFUR COLLOID: HCPCS

## 2020-02-07 ENCOUNTER — TELEPHONE (OUTPATIENT)
Dept: GASTROENTEROLOGY | Facility: MEDICAL CENTER | Age: 70
End: 2020-02-07

## 2020-02-07 NOTE — RESULT ENCOUNTER NOTE
My medical assistant will call patient with results      Her gastric emptying study showed very mildly delayed emptying of the stomach  She should continue to eat small and frequent meals as discussed during the office visit    She is scheduled for EGD/colonoscopy with me on 2/11

## 2020-02-11 ENCOUNTER — HOSPITAL ENCOUNTER (OUTPATIENT)
Dept: GASTROENTEROLOGY | Facility: MEDICAL CENTER | Age: 70
Setting detail: OUTPATIENT SURGERY
Discharge: HOME | End: 2020-02-11
Admitting: ANESTHESIOLOGY
Payer: MEDICARE

## 2020-02-11 ENCOUNTER — ANESTHESIA (OUTPATIENT)
Dept: GASTROENTEROLOGY | Facility: MEDICAL CENTER | Age: 70
End: 2020-02-11

## 2020-02-11 VITALS
BODY MASS INDEX: 29.64 KG/M2 | OXYGEN SATURATION: 96 % | HEIGHT: 61 IN | TEMPERATURE: 100.8 F | HEART RATE: 89 BPM | WEIGHT: 157 LBS | DIASTOLIC BLOOD PRESSURE: 72 MMHG | SYSTOLIC BLOOD PRESSURE: 144 MMHG | RESPIRATION RATE: 16 BRPM

## 2020-02-11 DIAGNOSIS — K21.00 GASTROESOPHAGEAL REFLUX DISEASE WITH ESOPHAGITIS: ICD-10-CM

## 2020-02-11 DIAGNOSIS — R68.81 EARLY SATIETY: ICD-10-CM

## 2020-02-11 DIAGNOSIS — Z12.11 COLON CANCER SCREENING: ICD-10-CM

## 2020-02-11 LAB — GLUCOSE SERPL-MCNC: 194 MG/DL (ref 65–140)

## 2020-02-11 PROCEDURE — 88305 TISSUE EXAM BY PATHOLOGIST: CPT | Performed by: PATHOLOGY

## 2020-02-11 PROCEDURE — NC001 PR NO CHARGE: Performed by: INTERNAL MEDICINE

## 2020-02-11 PROCEDURE — 45380 COLONOSCOPY AND BIOPSY: CPT | Performed by: INTERNAL MEDICINE

## 2020-02-11 PROCEDURE — 82948 REAGENT STRIP/BLOOD GLUCOSE: CPT

## 2020-02-11 PROCEDURE — 43239 EGD BIOPSY SINGLE/MULTIPLE: CPT | Performed by: INTERNAL MEDICINE

## 2020-02-11 PROCEDURE — 45385 COLONOSCOPY W/LESION REMOVAL: CPT | Performed by: INTERNAL MEDICINE

## 2020-02-11 RX ORDER — LIDOCAINE HYDROCHLORIDE 20 MG/ML
INJECTION, SOLUTION EPIDURAL; INFILTRATION; INTRACAUDAL; PERINEURAL AS NEEDED
Status: DISCONTINUED | OUTPATIENT
Start: 2020-02-11 | End: 2020-02-11 | Stop reason: SURG

## 2020-02-11 RX ORDER — PROPOFOL 10 MG/ML
INJECTION, EMULSION INTRAVENOUS AS NEEDED
Status: DISCONTINUED | OUTPATIENT
Start: 2020-02-11 | End: 2020-02-11 | Stop reason: SURG

## 2020-02-11 RX ORDER — ONDANSETRON 2 MG/ML
INJECTION INTRAMUSCULAR; INTRAVENOUS AS NEEDED
Status: DISCONTINUED | OUTPATIENT
Start: 2020-02-11 | End: 2020-02-11 | Stop reason: SURG

## 2020-02-11 RX ORDER — SODIUM CHLORIDE 9 MG/ML
125 INJECTION, SOLUTION INTRAVENOUS CONTINUOUS
Status: DISCONTINUED | OUTPATIENT
Start: 2020-02-11 | End: 2020-02-15 | Stop reason: HOSPADM

## 2020-02-11 RX ORDER — PROPOFOL 10 MG/ML
INJECTION, EMULSION INTRAVENOUS CONTINUOUS PRN
Status: DISCONTINUED | OUTPATIENT
Start: 2020-02-11 | End: 2020-02-11 | Stop reason: SURG

## 2020-02-11 RX ADMIN — PROPOFOL 100 MG: 10 INJECTION, EMULSION INTRAVENOUS at 08:03

## 2020-02-11 RX ADMIN — PROPOFOL 20 MG: 10 INJECTION, EMULSION INTRAVENOUS at 08:16

## 2020-02-11 RX ADMIN — PROPOFOL 140 MCG/KG/MIN: 10 INJECTION, EMULSION INTRAVENOUS at 08:03

## 2020-02-11 RX ADMIN — SODIUM CHLORIDE 125 ML/HR: 0.9 INJECTION, SOLUTION INTRAVENOUS at 07:55

## 2020-02-11 RX ADMIN — LIDOCAINE HYDROCHLORIDE 3 ML: 20 INJECTION, SOLUTION EPIDURAL; INFILTRATION; INTRACAUDAL; PERINEURAL at 08:03

## 2020-02-11 RX ADMIN — ONDANSETRON 4 MG: 2 INJECTION INTRAMUSCULAR; INTRAVENOUS at 08:14

## 2020-02-11 NOTE — ANESTHESIA POSTPROCEDURE EVALUATION
Post-Op Assessment Note    CV Status:  Stable    Pain management: adequate     Mental Status:  Alert and awake   Hydration Status:  Euvolemic   PONV Controlled:  Controlled   Airway Patency:  Patent   Post Op Vitals Reviewed:  Yes              /72 (02/11/20 0852)    Temp     Pulse 89 (02/11/20 0852)   Resp 16 (02/11/20 0852)    SpO2 96 % (02/11/20 0852)

## 2020-02-11 NOTE — ANESTHESIA PREPROCEDURE EVALUATION
Review of Systems/Medical History  Patient summary reviewed  Chart reviewed      Cardiovascular  Hyperlipidemia, Hypertension controlled,    Pulmonary  Negative pulmonary ROS        GI/Hepatic    Bowel prep            Endo/Other  Diabetes well controlled type 2 Insulin,      GYN       Hematology  Negative hematology ROS      Musculoskeletal  Negative musculoskeletal ROS        Neurology  Negative neurology ROS      Psychology   Negative psychology ROS              Physical Exam    Airway    Mallampati score: I  TM Distance: <3 FB  Neck ROM: full     Dental   No notable dental hx     Cardiovascular  Rhythm: regular, Rate: normal, Cardiovascular exam normal    Pulmonary  Pulmonary exam normal     Other Findings        Anesthesia Plan  ASA Score- 2     Anesthesia Type- IV sedation with anesthesia with ASA Monitors  Additional Monitors:   Airway Plan:         Plan Factors- Patient instructed to abstain from smoking on day of procedure  Patient did not smoke on day of surgery  Induction- intravenous  Postoperative Plan-     Informed Consent- Anesthetic plan and risks discussed with patient

## 2020-02-11 NOTE — H&P
H&P EXAM - Outpatient Endoscopy   Yue Washburn 77 y o  female MRN: [id number]    [address] [facility]   Encounter: [id number]      History and Physical - [facility]  Yue Washburn 77 y o  female MRN: [id number]                  HPI: Yue Washburn is a 77y o  year old female who presents for egd/colonoscopy      REVIEW OF SYSTEMS: Per the HPI, and otherwise unremarkable  Historical Information   Past Medical History:   Diagnosis Date    Diabetes mellitus ([address] )     Hyperlipidemia     Hypertension      Past Surgical History:   Procedure Laterality Date    CARPAL TUNNEL RELEASE      DILATION AND CURETTAGE OF UTERUS      HYSTERECTOMY      Total Abdominal age 37   [place] TONSIL[facility]       Social History   Social History     Substance and Sexual Activity   Alcohol Use No     Social History     Substance and Sexual Activity   Drug Use No     Social History     Tobacco Use   Smoking Status Never Smoker   Smokeless Tobacco Never Used     Family History   Problem Relation Age of Onset    Diabetes Father     Heart attack Father     Uterine cancer Maternal Grandmother 61    Lung cancer Mother 68    No Known Problems Maternal Grandfather     No Known Problems Paternal Grandmother     No Known Problems Paternal Grandfather     No Known Problems Maternal Aunt     No Known Problems Paternal Aunt        Meds/Allergies       (Not in a hospital admission)    Allergies   Allergen Reactions    Pioglitazone      Annotation - [id number]: Numbness in arms; Annotation - [id number]: numbness    Wound Dressings Rash       Objective     There were no vitals taken for this visit  PHYSICAL EXAM    Gen: NAD  CV: RRR  CHEST: Clear  ABD: soft, NT/ND  EXT: no edema      ASSESSMENT/PLAN:  This is a 77y o  year old female here for egd/colonoscopy, and she is stable and optimized for her procedure

## 2020-02-11 NOTE — DISCHARGE INSTRUCTIONS
Colonoscopy   WHAT YOU NEED TO KNOW:   A colonoscopy is a procedure to examine the inside of your colon (intestine) with a scope  Polyps or tissue growths may have been removed during your colonoscopy  It is normal to feel bloated and to have some abdominal discomfort  You should be passing gas  If you have hemorrhoids or you had polyps removed, you may have a small amount of bleeding  DISCHARGE INSTRUCTIONS:   Seek care immediately if:   · You have a large amount of bright red blood in your bowel movements  · Your abdomen is hard and firm and you have severe pain  · You have sudden trouble breathing  Contact your healthcare provider if:   · You develop a rash or hives  · You have a fever within 24 hours of your procedure  · You have not had a bowel movement for 3 days after your procedure  · You have questions or concerns about your condition or care  Activity:   · Do not lift, strain, or run  for 3 days after your procedure  · Rest after your procedure  You have been given medicine to relax you  Do not  drive or make important decisions until the day after your procedure  Return to your normal activity as directed  · Relieve gas and discomfort from bloating  by lying on your right side with a heating pad on your abdomen  You may need to take short walks to help the gas move out  Eat small meals until bloating is relieved  If you had polyps removed: For 7 days after your procedure:  · Do not  take aspirin  · Do not  go on long car rides  Help prevent constipation:   · Eat a variety of healthy foods  Healthy foods include fruit, vegetables, whole-grain breads, low-fat dairy products, beans, lean meat, and fish  Ask if you need to be on a special diet  Your healthcare provider may recommend that you eat high-fiber foods such as cooked beans  Fiber helps you have regular bowel movements  · Drink liquids as directed    Adults should drink between 9 and 13 eight-ounce cups of liquid every day  Ask what amount is best for you  For most people, good liquids to drink are water, juice, and milk  · Exercise as directed  Talk to your healthcare provider about the best exercise plan for you  Exercise can help prevent constipation, decrease your blood pressure and improve your health  Follow up with your healthcare provider as directed:  Write down your questions so you remember to ask them during your visits  © 2017 [employer] Information is for End User's use only and may not be sold, redistributed or otherwise used for commercial purposes  All illustrations and images included in CareNotes® are the copyrighted property of [employer]  or [employer]  The above information is an  only  It is not intended as medical advice for individual conditions or treatments  Talk to your doctor, nurse or pharmacist before following any medical regimen to see if it is safe and effective for you  Colonoscopy   WHAT YOU NEED TO KNOW:   A colonoscopy is a procedure to examine the inside of your colon (intestine) with a scope  Polyps or tissue growths may have been removed during your colonoscopy  It is normal to feel bloated and to have some abdominal discomfort  You should be passing gas  If you have hemorrhoids or you had polyps removed, you may have a small amount of bleeding  DISCHARGE INSTRUCTIONS:   Seek care immediately if:   · You have a large amount of bright red blood in your bowel movements  · Your abdomen is hard and firm and you have severe pain  · You have sudden trouble breathing  Contact your healthcare provider if:   · You develop a rash or hives  · You have a fever within 24 hours of your procedure  · You have not had a bowel movement for 3 days after your procedure  · You have questions or concerns about your condition or care  Activity:   · Do not lift, strain, or run  for 3 days after your procedure      · Rest after your procedure  You have been given medicine to relax you  Do not  drive or make important decisions until the day after your procedure  Return to your normal activity as directed  · Relieve gas and discomfort from bloating  by lying on your right side with a heating pad on your abdomen  You may need to take short walks to help the gas move out  Eat small meals until bloating is relieved  If you had polyps removed: For 7 days after your procedure:  · Do not  take aspirin  · Do not  go on long car rides  Help prevent constipation:   · Eat a variety of healthy foods  Healthy foods include fruit, vegetables, whole-grain breads, low-fat dairy products, beans, lean meat, and fish  Ask if you need to be on a special diet  Your healthcare provider may recommend that you eat high-fiber foods such as cooked beans  Fiber helps you have regular bowel movements  · Drink liquids as directed  Adults should drink between 9 and 13 eight-ounce cups of liquid every day  Ask what amount is best for you  For most people, good liquids to drink are water, juice, and milk  · Exercise as directed  Talk to your healthcare provider about the best exercise plan for you  Exercise can help prevent constipation, decrease your blood pressure and improve your health  Follow up with your healthcare provider as directed:  Write down your questions so you remember to ask them during your visits  © 2017 2600 [facility] Information is for End User's use only and may not be sold, redistributed or otherwise used for commercial purposes  All illustrations and images included in CareNotes® are the copyrighted property of A [employer] A M [employer] Inc  or [employer]  The above information is an  only  It is not intended as medical advice for individual conditions or treatments   Talk to your doctor, nurse or pharmacist before following any medical regimen to see if it is safe and effective for you           Colonoscopy   WHAT YOU NEED TO KNOW:   A colonoscopy is a procedure to examine the inside of your colon (intestine) with a scope  Polyps or tissue growths may have been removed during your colonoscopy  It is normal to feel bloated and to have some abdominal discomfort  You should be passing gas  If you have hemorrhoids or you had polyps removed, you may have a small amount of bleeding  DISCHARGE INSTRUCTIONS:   Seek care immediately if:   · You have a large amount of bright red blood in your bowel movements  · Your abdomen is hard and firm and you have severe pain  · You have sudden trouble breathing  Contact your healthcare provider if:   · You develop a rash or hives  · You have a fever within 24 hours of your procedure  · You have not had a bowel movement for 3 days after your procedure  · You have questions or concerns about your condition or care  Activity:   · Do not lift, strain, or run  for 3 days after your procedure  · Rest after your procedure  You have been given medicine to relax you  Do not  drive or make important decisions until the day after your procedure  Return to your normal activity as directed  · Relieve gas and discomfort from bloating  by lying on your right side with a heating pad on your abdomen  You may need to take short walks to help the gas move out  Eat small meals until bloating is relieved  If you had polyps removed: For 7 days after your procedure:  · Do not  take aspirin  · Do not  go on long car rides  Help prevent constipation:   · Eat a variety of healthy foods  Healthy foods include fruit, vegetables, whole-grain breads, low-fat dairy products, beans, lean meat, and fish  Ask if you need to be on a special diet  Your healthcare provider may recommend that you eat high-fiber foods such as cooked beans  Fiber helps you have regular bowel movements  · Drink liquids as directed    Adults should drink between 9 and 13 eight-ounce cups of liquid every day  Ask what amount is best for you  For most people, good liquids to drink are water, juice, and milk  · Exercise as directed  Talk to your healthcare provider about the best exercise plan for you  Exercise can help prevent constipation, decrease your blood pressure and improve your health  Follow up with your healthcare provider as directed:  Write down your questions so you remember to ask them during your visits  © 2017 [employer] Information is for End User's use only and may not be sold, redistributed or otherwise used for commercial purposes  All illustrations and images included in CareNotes® are the copyrighted property of [employer]  or [employer]  The above information is an  only  It is not intended as medical advice for individual conditions or treatments  Talk to your doctor, nurse or pharmacist before following any medical regimen to see if it is safe and effective for you  Colorectal Polyps   WHAT YOU NEED TO KNOW:   What are colorectal polyps? Colorectal polyps are small growths of tissue in the lining of the colon and rectum  Most polyps are hyperplastic polyps and are usually benign (noncancerous)  Certain types of polyps, called adenomatous polyps, may turn into cancer  What increases my risk of colorectal polyps? The exact cause of colorectal polyps is unknown  The following may increase your risk:  · Older age    · A diet of foods high in fat and low in fiber     · Family history of polyps    · Intestinal diseases, such as Crohn's disease or ulcerative colitis    · An unhealthy lifestyle, such as physical inactivity, smoking, or drinking alcohol    · Obesity  What are the signs and symptoms of colorectal polyps? · Blood in your bowel movement or bleeding from the rectum    · Change in bowel movement habits, such as diarrhea and constipation    · Abdominal pain  How are colorectal polyps diagnosed? You should have fecal blood screening once a year for colorectal disease if you are over 48years old  You should be screened earlier if you have an intestinal disease or a family history of polyps or colorectal cancer  During this screening, a sample of your bowel movement is checked for blood, which may be an early sign of colorectal polyps or cancer  You may also need any of the following tests:  · Digital rectal exam:  Your healthcare provider will examine your anus and use a finger to check your rectum for polyps  · Barium enema: A barium enema is an x-ray of the colon  A tube is put into your anus, and a liquid called barium is put through the tube  Barium is used so that caregivers can see your colon better on the x-ray film  · Virtual colonoscopy: This is a CT scan that takes pictures of the inside of your colon and rectum  A small, flexible tube is put into your rectum and air or carbon dioxide (gas) is used to expand your colon  This lets healthcare providers clearly see your colon and any polyps on a monitor  · Colonoscopy or sigmoidoscopy: These procedures help your healthcare provider see the inside of your colon using a flexible tube with a small light and camera on the end  During a sigmoidoscopy, your healthcare provider will only look at rectum and lower colon  During a colonoscopy, healthcare providers will look at the full length of your colon  Healthcare providers may remove a small amount of tissue from the colon for a biopsy  How are colorectal polyps treated? · Polyp removal:  Polyps may be removed during your sigmoidoscopy or colonoscopy  · Polypectomy: This is surgery to remove your polyps  You may need laparoscopic or open surgery, depending on the type, size, and number of polyps that you have  Laparoscopy is done by inserting a small, flexible scope into incisions made on your abdomen  Open surgery is done by making a larger incision on your abdomen     What are the risks of colorectal polyps? You may bleed during a colonoscopy procedure  Your bowel may be perforated (torn) when polyps are removed  This may lead to an open abdominal surgery  During surgery, you may bleed too much or get an infection  Adenomatous polyps that are not removed may turn into cancer and become more difficult to treat  Where can I find support and more information? · [address] ([facility])  [address] [place], [place] [address]  Phone: [phone]  Web Address: Parvin Estrada  [facility] gov  When should I contact my healthcare provider? · You have a fever  · You have chills, a cough, or feel weak and achy  · You have abdominal pain that does not go away or gets worse after you take medicine  · Your abdomen is swollen  · You are losing weight without trying  · You have questions or concerns about your condition or care  When should I seek immediate care or call 911? · You have sudden shortness of breath  · You have a fast heart rate, fast breathing, or are too dizzy to stand up  · You have severe abdominal pain  · You see blood in your bowel movement  CARE AGREEMENT:   You have the right to help plan your care  Learn about your health condition and how it may be treated  Discuss treatment options with your caregivers to decide what care you want to receive  You always have the right to refuse treatment  The above information is an  only  It is not intended as medical advice for individual conditions or treatments  Talk to your doctor, nurse or pharmacist before following any medical regimen to see if it is safe and effective for you  © 2017 [address] [employer] Information is for End User's use only and may not be sold, redistributed or otherwise used for commercial purposes   All illustrations and images included in CareNotes® are the copyrighted property of [employer]  or [employer]

## 2020-02-12 ENCOUNTER — OFFICE VISIT (OUTPATIENT)
Dept: INTERNAL MEDICINE CLINIC | Facility: CLINIC | Age: 70
End: 2020-02-12
Payer: MEDICARE

## 2020-02-12 VITALS
TEMPERATURE: 100.8 F | SYSTOLIC BLOOD PRESSURE: 136 MMHG | BODY MASS INDEX: 28.13 KG/M2 | HEART RATE: 88 BPM | DIASTOLIC BLOOD PRESSURE: 70 MMHG | OXYGEN SATURATION: 94 % | WEIGHT: 149 LBS | HEIGHT: 61 IN

## 2020-02-12 DIAGNOSIS — R68.89 FLU-LIKE SYMPTOMS: ICD-10-CM

## 2020-02-12 DIAGNOSIS — Z79.4 TYPE 2 DIABETES MELLITUS WITH HYPERGLYCEMIA, WITH LONG-TERM CURRENT USE OF INSULIN (HCC): ICD-10-CM

## 2020-02-12 DIAGNOSIS — E11.65 TYPE 2 DIABETES MELLITUS WITH HYPERGLYCEMIA, WITH LONG-TERM CURRENT USE OF INSULIN (HCC): ICD-10-CM

## 2020-02-12 DIAGNOSIS — J11.1 INFLUENZA: Primary | ICD-10-CM

## 2020-02-12 DIAGNOSIS — I10 ESSENTIAL HYPERTENSION: ICD-10-CM

## 2020-02-12 PROCEDURE — 4040F PNEUMOC VAC/ADMIN/RCVD: CPT | Performed by: INTERNAL MEDICINE

## 2020-02-12 PROCEDURE — 3008F BODY MASS INDEX DOCD: CPT | Performed by: INTERNAL MEDICINE

## 2020-02-12 PROCEDURE — 99214 OFFICE O/P EST MOD 30 MIN: CPT | Performed by: INTERNAL MEDICINE

## 2020-02-12 PROCEDURE — 3078F DIAST BP <80 MM HG: CPT | Performed by: INTERNAL MEDICINE

## 2020-02-12 PROCEDURE — 1160F RVW MEDS BY RX/DR IN RCRD: CPT | Performed by: INTERNAL MEDICINE

## 2020-02-12 PROCEDURE — 2022F DILAT RTA XM EVC RTNOPTHY: CPT | Performed by: INTERNAL MEDICINE

## 2020-02-12 PROCEDURE — 1036F TOBACCO NON-USER: CPT | Performed by: INTERNAL MEDICINE

## 2020-02-12 PROCEDURE — 3060F POS MICROALBUMINURIA REV: CPT | Performed by: INTERNAL MEDICINE

## 2020-02-12 PROCEDURE — 3075F SYST BP GE 130 - 139MM HG: CPT | Performed by: INTERNAL MEDICINE

## 2020-02-12 PROCEDURE — 3046F HEMOGLOBIN A1C LEVEL >9.0%: CPT | Performed by: INTERNAL MEDICINE

## 2020-02-12 PROCEDURE — 87631 RESP VIRUS 3-5 TARGETS: CPT | Performed by: INTERNAL MEDICINE

## 2020-02-12 NOTE — PROGRESS NOTES
[facility]      NAME: Gemini Fulton  AGE: 77 y o  SEX: female  : 1950   MRN: [id number]    DATE: 2020  TIME: 1:38 PM    Assessment and Plan   1  Influenza  The patient's symptoms are suggestive of influenza  A flu swab was obtained  She will be started on Tamiflu  2  Type 2 diabetes mellitus with hyperglycemia, with long-term current use of insulin ([address] )  I asked the patient to reduce Lantus to 20 units daily until her appetite improves  3  Essential hypertension  Well controlled  Clinically, the patient appears to have influenza  She will be started on therapy promptly  She was advised to use acetaminophen for discomfort  She was asked to drink plenty of fluid  She will stay out of work at least until Monday  Return to office in:  As scheduled    Chief Complaint     Chief Complaint   Patient presents with    Cough     since monday, no appetite     Fever     severe body aches        History of Present Illness     The patient came to the office unexpectedly because of cough and fever  This started about 2 days ago  Initially, her symptoms were mild  She was prepping for colonoscopy  Yesterday, she underwent EGD and colonoscopy  She was noted to have a temperature of 100 8° at that time  Subsequently, she felt worse with intensified myalgias  She has a headache  She has dry cough  She has no chest pain or shortness of breath  She is fatigued  The following portions of the patient's history were reviewed and updated as appropriate: allergies, current medications, past family history, past medical history, past social history, past surgical history and problem list     Review of Systems   Review of Systems   Constitutional: Negative  HENT: Negative for congestion, ear pain, postnasal drip, rhinorrhea, sore throat and trouble swallowing  Eyes: Negative for pain, discharge, redness and visual disturbance  Respiratory: Positive for cough  Negative for shortness of breath and wheezing  Cardiovascular: Negative  Gastrointestinal: Negative  Endocrine: Negative  Genitourinary: Negative for difficulty urinating, dysuria, frequency, hematuria and urgency  Musculoskeletal: Positive for myalgias  Negative for arthralgias, gait problem and joint swelling  Skin: Negative for rash  Neurological: Positive for headaches  Negative for dizziness, speech difficulty, weakness, light-headedness and numbness  Hematological: Negative  Psychiatric/Behavioral: Negative for confusion, decreased concentration, dysphoric mood and sleep disturbance  The patient is not nervous/anxious  Active Problem List     Patient Active Problem List   Diagnosis    Allergic rhinitis    Hypertension    Gastroesophageal reflux disease with esophagitis    Hypercholesterolemia    Type 2 diabetes mellitus with hyperglycemia (HCC)    Vitamin D deficiency    Wrist joint pain    Sciatica of right side    Influenza       Objective   /70 (BP Location: Left arm, Patient Position: Sitting, Cuff Size: Standard)   Pulse 88   Temp (!) 100 8 °F (38 2 °C) (Tympanic)   Ht 5' 1" (1 549 m)   Wt 67 6 kg (149 lb)   SpO2 94%   BMI 28 15 kg/m²     Physical Exam   Constitutional: She is oriented to person, place, and time  She appears well-developed and well-nourished  Appearing fatigued and uncomfortable   HENT:   Head: Normocephalic and atraumatic  Right Ear: External ear normal    Left Ear: External ear normal    Nose: Nose normal    Mouth/Throat: Oropharynx is clear and moist    Neck: Neck supple  No JVD present  No tracheal deviation present  No thyromegaly present  Cardiovascular: Normal rate, regular rhythm, normal heart sounds and intact distal pulses  Exam reveals no gallop and no friction rub  No murmur heard  Pulmonary/Chest: Effort normal and breath sounds normal  She has no wheezes  She has no rales  She exhibits no tenderness     Abdominal: Soft  Bowel sounds are normal  She exhibits no distension and no mass  There is no tenderness  There is no rebound  Musculoskeletal: Normal range of motion  She exhibits no edema or tenderness  Lymphadenopathy:     She has no cervical adenopathy  Neurological: She is alert and oriented to person, place, and time  Skin: Skin is warm and dry  Psychiatric: She has a normal mood and affect  Her behavior is normal  Judgment and thought content normal        Pertinent Laboratory/Diagnostic Studies:  Hospital Outpatient Visit on 02/11/2020   Component Date Value Ref Range Status    POC Glucose 02/11/2020 194* 65 - 140 mg/dl Final   Office Visit on 01/30/2020   Component Date Value Ref Range Status    Creatinine, Ur 01/30/2020 151 0  mg/dL Final    Microalbum  ,U,Random 01/30/2020 31 9* 0 0 - 20 0 mg/L Final    Microalb Creat Ratio 01/30/2020 21  0 - 30 mg/g creatinine Final   Appointment on 01/28/2020   Component Date Value Ref Range Status    WBC 01/28/2020 9 51  4 31 - 10 16 Thousand/uL Final    RBC 01/28/2020 4 41  3 81 - 5 12 Million/uL Final    Hemoglobin 01/28/2020 12 7  11 5 - 15 4 g/dL Final    Hematocrit 01/28/2020 39 3  34 8 - 46 1 % Final    MCV 01/28/2020 89  82 - 98 fL Final    MCH 01/28/2020 28 8  26 8 - 34 3 pg Final    MCHC 01/28/2020 32 3  31 4 - 37 4 g/dL Final    RDW 01/28/2020 12 4  11 6 - 15 1 % Final    Platelets 62/60/1431 235  149 - 390 Thousands/uL Final    MPV 01/28/2020 12 1  8 9 - 12 7 fL Final    Sodium 01/28/2020 140  136 - 145 mmol/L Final    Potassium 01/28/2020 4 0  3 5 - 5 3 mmol/L Final    Chloride 01/28/2020 104  100 - 108 mmol/L Final    CO2 01/28/2020 29  21 - 32 mmol/L Final    ANION GAP 01/28/2020 7  4 - 13 mmol/L Final    BUN 01/28/2020 19  5 - 25 mg/dL Final    Creatinine 01/28/2020 0 78  0 60 - 1 30 mg/dL Final    Glucose, Fasting 01/28/2020 154* 65 - 99 mg/dL Final    Calcium 01/28/2020 9 4  8 3 - 10 1 mg/dL Final    AST 01/28/2020 16  5 - 45 U/L Final    ALT 01/28/2020 27  12 - 78 U/L Final    Alkaline Phosphatase 01/28/2020 53  46 - 116 U/L Final    Total Protein 01/28/2020 6 9  6 4 - 8 2 g/dL Final    Albumin 01/28/2020 3 9  3 5 - 5 0 g/dL Final    Total Bilirubin 01/28/2020 0 37  0 20 - 1 00 mg/dL Final    eGFR 01/28/2020 78  ml/min/1 73sq m Final    Hemoglobin A1C 01/28/2020 9 7* 4 2 - 6 3 % Final    EAG 01/28/2020 232  mg/dl Final    Cholesterol 01/28/2020 164  50 - 200 mg/dL Final    Triglycerides 01/28/2020 209* <=150 mg/dL Final    HDL, Direct 01/28/2020 36* >=40 mg/dL Final    LDL Calculated 01/28/2020 86  0 - 100 mg/dL Final    Non-HDL-Chol (CHOL-HDL) 01/28/2020 128  mg/dl Final           Current Medications     Current Outpatient Medications:     insulin glargine (BASAGLAR KWIKPEN) 100 units/mL injection pen, Inject 30 Units under the skin daily at bedtime, Disp: 5 pen, Rfl: 0    Insulin Pen Needle (EASY TOUCH PEN NEEDLES) 31G X 8 MM MISC, by Does not apply route daily, Disp: 100 each, Rfl: 1    JANUVIA 100 MG tablet, TAKE 1 TABLET DAILY  , Disp: 30 tablet, Rfl: 4    lisinopril-hydrochlorothiazide (PRINZIDE,ZESTORETIC) 20-12 5 MG per tablet, Take 1 tablet by mouth daily, Disp: 90 tablet, Rfl: 1    metFORMIN (GLUCOPHAGE) 500 mg tablet, Take 2 tablets (1,000 mg total) by mouth 2 (two) times a day, Disp: 180 tablet, Rfl: 1    omeprazole (PriLOSEC) 40 MG capsule, Take 1 capsule (40 mg total) by mouth daily before breakfast, Disp: 90 capsule, Rfl: 3    rosuvastatin (CRESTOR) 10 MG tablet, Take 1 tablet (10 mg total) by mouth daily, Disp: 90 tablet, Rfl: 3  No current facility-administered medications for this visit       Facility-Administered Medications Ordered in Other Visits:     sodium chloride 0 9 % infusion, 125 mL/hr, Intravenous, Continuous, Rickey Rae DO, Stopped at 02/11/20 7839      Kevin Cam MD

## 2020-02-13 DIAGNOSIS — J11.1 INFLUENZA: Primary | ICD-10-CM

## 2020-02-13 LAB
FLUAV RNA NPH QL NAA+PROBE: DETECTED
FLUBV RNA NPH QL NAA+PROBE: ABNORMAL
RSV RNA NPH QL NAA+PROBE: ABNORMAL

## 2020-02-13 RX ORDER — OSELTAMIVIR PHOSPHATE 75 MG/1
75 CAPSULE ORAL 2 TIMES DAILY
Qty: 10 CAPSULE | Refills: 0 | Status: SHIPPED | OUTPATIENT
Start: 2020-02-13 | End: 2020-02-18

## 2020-02-14 ENCOUNTER — TELEPHONE (OUTPATIENT)
Dept: GASTROENTEROLOGY | Facility: MEDICAL CENTER | Age: 70
End: 2020-02-14

## 2020-02-14 NOTE — TELEPHONE ENCOUNTER
----- Message from Sy Scott MD sent at 2/14/2020  1:03 PM EST -----  My medical assistant will call patient with results  Stomach biopsy showed inflammation but no evidence of infection  Continue using omeprazole daily  The colon polyp removed was called a tubular adenoma  This is a pre-cancerous lesion and was completely removed  There was no evidence of cancer in the polyp       she should have the colonoscopy repeated in 5 years due to a history of colon polyps  She is scheduled for follow up office visit in March

## 2020-02-14 NOTE — RESULT ENCOUNTER NOTE
My medical assistant will call patient with results  Stomach biopsy showed inflammation but no evidence of infection  Continue using omeprazole daily  The colon polyp removed was called a tubular adenoma  This is a pre-cancerous lesion and was completely removed  There was no evidence of cancer in the polyp       she should have the colonoscopy repeated in 5 years due to a history of colon polyps  She is scheduled for follow up office visit in March

## 2020-03-03 ENCOUNTER — OFFICE VISIT (OUTPATIENT)
Dept: GASTROENTEROLOGY | Facility: MEDICAL CENTER | Age: 70
End: 2020-03-03
Payer: MEDICARE

## 2020-03-03 VITALS
SYSTOLIC BLOOD PRESSURE: 110 MMHG | WEIGHT: 149.4 LBS | BODY MASS INDEX: 28.21 KG/M2 | DIASTOLIC BLOOD PRESSURE: 64 MMHG | TEMPERATURE: 98.5 F | HEART RATE: 80 BPM | HEIGHT: 61 IN

## 2020-03-03 DIAGNOSIS — Z79.4 TYPE 2 DIABETES MELLITUS WITH HYPERGLYCEMIA, WITH LONG-TERM CURRENT USE OF INSULIN (HCC): ICD-10-CM

## 2020-03-03 DIAGNOSIS — K21.00 GASTROESOPHAGEAL REFLUX DISEASE WITH ESOPHAGITIS: Primary | ICD-10-CM

## 2020-03-03 DIAGNOSIS — K31.84 GASTROPARESIS: ICD-10-CM

## 2020-03-03 DIAGNOSIS — E11.65 TYPE 2 DIABETES MELLITUS WITH HYPERGLYCEMIA, WITH LONG-TERM CURRENT USE OF INSULIN (HCC): ICD-10-CM

## 2020-03-03 DIAGNOSIS — Z86.010 HISTORY OF ADENOMATOUS POLYP OF COLON: ICD-10-CM

## 2020-03-03 PROCEDURE — 99214 OFFICE O/P EST MOD 30 MIN: CPT | Performed by: INTERNAL MEDICINE

## 2020-03-03 PROCEDURE — 4040F PNEUMOC VAC/ADMIN/RCVD: CPT | Performed by: INTERNAL MEDICINE

## 2020-03-03 PROCEDURE — 3078F DIAST BP <80 MM HG: CPT | Performed by: INTERNAL MEDICINE

## 2020-03-03 PROCEDURE — 3008F BODY MASS INDEX DOCD: CPT | Performed by: INTERNAL MEDICINE

## 2020-03-03 PROCEDURE — 3074F SYST BP LT 130 MM HG: CPT | Performed by: INTERNAL MEDICINE

## 2020-03-03 PROCEDURE — 3046F HEMOGLOBIN A1C LEVEL >9.0%: CPT | Performed by: INTERNAL MEDICINE

## 2020-03-03 PROCEDURE — 1036F TOBACCO NON-USER: CPT | Performed by: INTERNAL MEDICINE

## 2020-03-03 PROCEDURE — 3060F POS MICROALBUMINURIA REV: CPT | Performed by: INTERNAL MEDICINE

## 2020-03-03 PROCEDURE — 2022F DILAT RTA XM EVC RTNOPTHY: CPT | Performed by: INTERNAL MEDICINE

## 2020-03-03 PROCEDURE — 1160F RVW MEDS BY RX/DR IN RCRD: CPT | Performed by: INTERNAL MEDICINE

## 2020-03-03 NOTE — PROGRESS NOTES
[facility] - Outpatient Follow-up Note  Anurag Razo 77 y o  female MRN: [id number]  Encounter: [id number]          ASSESSMENT AND PLAN:      1  Gastroesophageal reflux disease with esophagitis  2  Type 2 diabetes mellitus with hyperglycemia, with long-term current use of insulin (HCC)  3  Gastroparesis    GERD is currently controlled on omeprazole 40 mg daily  Recent upper endoscopy showed mild gastritis which was H pylori negative, otherwise normal EGD   Gastric emptying study this year showed mild gastric emptying  I suspect her symptoms are combination of gastroesophageal reflux and very mild gastroparesis likely due to her underlying diabetes  We discussed dietary and lifestyle anti-reflux modification today  I provided her handout regarding gastroparesis diet with smaller frequent meals  She will continue omeprazole 40 mg daily  She is following closely with her primary care provider regarding better control of her type 2 diabetes  Initially she reported 35 lb unintentional weight loss  Upper endoscopy and colonoscopy or overall remarkable as an etiology  She had recent influenza illness reports additionally lb weight loss  On  review of her electronic medical record her weight today is 149 lb  Her weight in December 2018 was 148 lb, and in July 2019 was 153 lb  Continue to monitor her weight trend  If objective signs of weightloss consider CT imaging, thyroid testing or other workup  4  History of adenomatous polyp of colon  2020 colonoscopy showed 1 subcentimeter tubular adenoma  She will require repeat colonoscopy in 5 years for surveillance    Return to office in 6 months for follow up   ______________________________________________________________________    SUBJECTIVE:  Anurag Razo is a 77 y o  female with a history of hypertension, GERD and type 2 diabetes, gastroparesis, who presents for follow up evaluation      She was 1st seen in the GI office January 3, 2020  At that time she had been having intermittent symptoms of acid reflux on and off for many years, however with worsening since the summer of 2019  This was associated with nausea  She was started on omeprazole 40 mg daily  She also noted a 35 lb unintentional weight loss  On starting the omeprazole she had almost complete resolution of her symptoms but still early satiety and some nausea  She previously had a gastric emptying study in 2014 she relates caffeine  Interval history:  February 6, 2020 gastric emptying study showed mild delayed gastric emptying at 3h (of note patient did not ingest the entirety of the meal which may have affected the results)  February 11th upper endoscopy with regular Z-line, erythema in the antrum, colonoscopy with  ascending colon lipoma and descending colon lipoma, 7 mm descending colon tubular adenoma small diverticulosis and internal hemorrhoids  Pathology showed chronic inactive gastritis without H pylori  She reports her symptoms of GERD are still well controlled on omeprazole 40 mg daily  She has no breakthrough symptoms on this medication  Her appetite has been low recently given recent influenza illness and she lost an additional 8 lb unintentionally  She feels her appetite is slightly improved since recovering  Her bowel movements have been 1-2 times per day and soft without blood  Her early satiety is less and her nausea is resolved  Prior EGD/colonoscopy:  2014 upper endoscopy with GE junction erythema, pathology with chronic inflammation without intestinal metaplasia, mild chronic inactive gastritis and mild edema of the duodenal lamina propria  2010 colonoscopy with ascending colon lipoma internal hemorrhoids  REVIEW OF SYSTEMS IS OTHERWISE NEGATIVE        Historical Information   Past Medical History:   Diagnosis Date    Diabetes mellitus ([facility] [address] )     Hyperlipidemia     Hypertension      Past Surgical History:   Procedure Laterality Date    CARPAL TUNNEL RELEASE      DILATION AND CURETTAGE OF UTERUS      HYSTERECTOMY      Total Abdominal age 37   [place] TONSILLECTOMY       Social History   Social History     Substance and Sexual Activity   Alcohol Use No     Social History     Substance and Sexual Activity   Drug Use No     Social History     Tobacco Use   Smoking Status Never Smoker   Smokeless Tobacco Never Used     Family History   Problem Relation Age of Onset    Diabetes Father     Heart attack Father     Uterine cancer Maternal Grandmother 61    Lung cancer Mother 68    No Known Problems Maternal Grandfather     No Known Problems Paternal Grandmother     No Known Problems Paternal Grandfather     No Known Problems Maternal Aunt     No Known Problems Paternal Aunt        Meds/Allergies       Current Outpatient Medications:     insulin glargine (BASAGLAR KWIKPEN) 100 units/mL injection pen    Insulin Pen Needle (EASY TOUCH PEN NEEDLES) 31G X 8 MM MISC    JANUVIA 100 MG tablet    lisinopril-hydrochlorothiazide (PRINZIDE,ZESTORETIC) 20-12 5 MG per tablet    metFORMIN (GLUCOPHAGE) 500 mg tablet    omeprazole (PriLOSEC) 40 MG capsule    rosuvastatin (CRESTOR) 10 MG tablet    Allergies   Allergen Reactions    Pioglitazone      Annotation - [id number]: Numbness in arms; Annotation - [id number]: numbness    Wound Dressings Rash           Objective     Blood pressure 110/64, pulse 80, temperature 98 5 °F (36 9 °C), height 5' 1" (1 549 m), weight 67 8 kg (149 lb 6 4 oz)  Body mass index is 28 23 kg/m²  PHYSICAL EXAM:      General Appearance:   Well appearing elderly female, Alert, cooperative, no distress   HEENT:   Normocephalic, atraumatic, anicteric  Neck:  Supple, symmetrical, trachea midline   Lungs:   Clear to auscultation bilaterally; no rales, rhonchi or wheezing; respirations unlabored    Heart[de-identified]   Regular rate and rhythm; no murmur, rub, or gallop     Abdomen:   Soft, non-tender, non-distended; normal bowel sounds; no masses, no organomegaly    Genitalia:   Deferred    Rectal:   Deferred    Extremities:  No cyanosis, clubbing or edema    Pulses:  2+ and symmetric    Skin:  No jaundice, rashes, or lesions    Lymph nodes:  No palpable cervical lymphadenopathy        Lab Results:   No visits with results within 1 Day(s) from this visit  Latest known visit with results is:   Office Visit on 02/12/2020   Component Date Value    INFLUENZA A PCR 02/13/2020 Detected*    INFLUENZA B PCR 02/13/2020 None Detected     RSV PCR 02/13/2020 None Detected          Radiology Results:   Nm Gastric Emptying    Result Date: 2/6/2020  Narrative: GASTRIC EMPTYING STUDY INDICATION: R68 81: Early satiety  Diabetes, reflux  COMPARISON:  Gastric emptying 12/3/2014 TECHNIQUE:   The study was performed following the oral administration of 1 1 mCi Tc-99m sulfur colloid combined with scrambled eggs, as part of a standard meal   The patient ingested the egg meal but none of the toast   Following the meal, one minute anterior and posterior images were obtained immediately and at 0 25 hours, 0 5 hour, 1 hour, 1 5 hour, 2 hour, 3 hour and 4 hour intervals from the time of ingestion  Geometric mean analyses were then performed  As of March 1, 2016, this gastric emptying protocol has been modified and updated  The gastric emptying times and the normal values reported below reflect the change in protocol  FINDINGS: Gastric emptying at 0 5 hours = 12 (N < 30%) Gastric emptying at 1 hour = 23 % (N = 10 - 70%) Gastric emptying at 2 hours = 46 % (N = > 40%) Gastric emptying at 3 hours = 69 % (N = > 70%) Gastric emptying at 4 hours = 92 % (N = >90%) Linear T-1/2 = 131 minutes Gastric emptying is noted to be mildly delayed at 3 hours  Emptying times are otherwise within normal limits  Previous gastric emptying study was noted to be delayed at 2, 3 and 4 hours  Impression: 1  Mildly delayed gastric emptying noted at 3 hours   2   Of note, patient did not ingest the entirety of the standardized meal which may affect these results   Workstation performed: [id number]

## 2020-03-09 DIAGNOSIS — E11.9 TYPE 2 DIABETES MELLITUS WITHOUT COMPLICATION, WITHOUT LONG-TERM CURRENT USE OF INSULIN (HCC): ICD-10-CM

## 2020-03-26 DIAGNOSIS — Z79.4 TYPE 2 DIABETES MELLITUS WITH HYPERGLYCEMIA, WITH LONG-TERM CURRENT USE OF INSULIN (HCC): ICD-10-CM

## 2020-03-26 DIAGNOSIS — E11.65 TYPE 2 DIABETES MELLITUS WITH HYPERGLYCEMIA, WITH LONG-TERM CURRENT USE OF INSULIN (HCC): ICD-10-CM

## 2020-03-27 DIAGNOSIS — Z79.4 TYPE 2 DIABETES MELLITUS WITH HYPERGLYCEMIA, WITH LONG-TERM CURRENT USE OF INSULIN (HCC): ICD-10-CM

## 2020-03-27 DIAGNOSIS — E11.65 TYPE 2 DIABETES MELLITUS WITH HYPERGLYCEMIA, WITH LONG-TERM CURRENT USE OF INSULIN (HCC): ICD-10-CM

## 2020-03-30 RX ORDER — SITAGLIPTIN 100 MG/1
TABLET, FILM COATED ORAL
Qty: 30 TABLET | Refills: 0 | Status: SHIPPED | OUTPATIENT
Start: 2020-03-30 | End: 2020-04-23 | Stop reason: SDUPTHER

## 2020-04-23 DIAGNOSIS — Z79.4 TYPE 2 DIABETES MELLITUS WITH HYPERGLYCEMIA, WITH LONG-TERM CURRENT USE OF INSULIN (HCC): ICD-10-CM

## 2020-04-23 DIAGNOSIS — E11.65 TYPE 2 DIABETES MELLITUS WITH HYPERGLYCEMIA, WITH LONG-TERM CURRENT USE OF INSULIN (HCC): ICD-10-CM

## 2020-05-01 DIAGNOSIS — E11.9 TYPE 2 DIABETES MELLITUS WITHOUT COMPLICATION, WITHOUT LONG-TERM CURRENT USE OF INSULIN (HCC): ICD-10-CM

## 2020-05-12 ENCOUNTER — APPOINTMENT (OUTPATIENT)
Dept: LAB | Facility: MEDICAL CENTER | Age: 70
End: 2020-05-12
Payer: MEDICARE

## 2020-05-12 DIAGNOSIS — E11.65 TYPE 2 DIABETES MELLITUS WITH HYPERGLYCEMIA, WITH LONG-TERM CURRENT USE OF INSULIN (HCC): ICD-10-CM

## 2020-05-12 DIAGNOSIS — Z79.4 TYPE 2 DIABETES MELLITUS WITH HYPERGLYCEMIA, WITH LONG-TERM CURRENT USE OF INSULIN (HCC): ICD-10-CM

## 2020-05-12 DIAGNOSIS — E78.00 HYPERCHOLESTEROLEMIA: ICD-10-CM

## 2020-05-12 LAB
ALBUMIN SERPL BCP-MCNC: 4 G/DL (ref 3.5–5)
ALP SERPL-CCNC: 53 U/L (ref 46–116)
ALT SERPL W P-5'-P-CCNC: 25 U/L (ref 12–78)
ANION GAP SERPL CALCULATED.3IONS-SCNC: 6 MMOL/L (ref 4–13)
AST SERPL W P-5'-P-CCNC: 17 U/L (ref 5–45)
BILIRUB SERPL-MCNC: 0.41 MG/DL (ref 0.2–1)
BUN SERPL-MCNC: 16 MG/DL (ref 5–25)
CALCIUM SERPL-MCNC: 9.2 MG/DL (ref 8.3–10.1)
CHLORIDE SERPL-SCNC: 104 MMOL/L (ref 100–108)
CHOLEST SERPL-MCNC: 180 MG/DL (ref 50–200)
CO2 SERPL-SCNC: 28 MMOL/L (ref 21–32)
CREAT SERPL-MCNC: 0.84 MG/DL (ref 0.6–1.3)
CREAT UR-MCNC: 127 MG/DL
EST. AVERAGE GLUCOSE BLD GHB EST-MCNC: 223 MG/DL
GFR SERPL CREATININE-BSD FRML MDRD: 71 ML/MIN/1.73SQ M
GLUCOSE P FAST SERPL-MCNC: 192 MG/DL (ref 65–99)
HBA1C MFR BLD: 9.4 %
HDLC SERPL-MCNC: 38 MG/DL
LDLC SERPL CALC-MCNC: 97 MG/DL (ref 0–100)
MICROALBUMIN UR-MCNC: 23.2 MG/L (ref 0–20)
MICROALBUMIN/CREAT 24H UR: 18 MG/G CREATININE (ref 0–30)
NONHDLC SERPL-MCNC: 142 MG/DL
POTASSIUM SERPL-SCNC: 3.9 MMOL/L (ref 3.5–5.3)
PROT SERPL-MCNC: 7.2 G/DL (ref 6.4–8.2)
SODIUM SERPL-SCNC: 138 MMOL/L (ref 136–145)
TRIGL SERPL-MCNC: 224 MG/DL

## 2020-05-12 PROCEDURE — 83036 HEMOGLOBIN GLYCOSYLATED A1C: CPT

## 2020-05-12 PROCEDURE — 36415 COLL VENOUS BLD VENIPUNCTURE: CPT

## 2020-05-12 PROCEDURE — 80061 LIPID PANEL: CPT

## 2020-05-12 PROCEDURE — 80053 COMPREHEN METABOLIC PANEL: CPT

## 2020-05-12 PROCEDURE — 82043 UR ALBUMIN QUANTITATIVE: CPT

## 2020-05-12 PROCEDURE — 82570 ASSAY OF URINE CREATININE: CPT

## 2020-05-13 ENCOUNTER — OFFICE VISIT (OUTPATIENT)
Dept: INTERNAL MEDICINE CLINIC | Facility: CLINIC | Age: 70
End: 2020-05-13
Payer: MEDICARE

## 2020-05-13 VITALS
OXYGEN SATURATION: 98 % | BODY MASS INDEX: 28.51 KG/M2 | TEMPERATURE: 98.8 F | SYSTOLIC BLOOD PRESSURE: 130 MMHG | WEIGHT: 151 LBS | DIASTOLIC BLOOD PRESSURE: 70 MMHG | HEART RATE: 90 BPM | HEIGHT: 61 IN

## 2020-05-13 DIAGNOSIS — K21.00 GASTROESOPHAGEAL REFLUX DISEASE WITH ESOPHAGITIS: ICD-10-CM

## 2020-05-13 DIAGNOSIS — Z79.4 TYPE 2 DIABETES MELLITUS WITH HYPERGLYCEMIA, WITH LONG-TERM CURRENT USE OF INSULIN (HCC): ICD-10-CM

## 2020-05-13 DIAGNOSIS — E78.00 HYPERCHOLESTEROLEMIA: ICD-10-CM

## 2020-05-13 DIAGNOSIS — E11.9 TYPE 2 DIABETES MELLITUS WITHOUT COMPLICATION, WITHOUT LONG-TERM CURRENT USE OF INSULIN (HCC): ICD-10-CM

## 2020-05-13 DIAGNOSIS — E55.9 VITAMIN D DEFICIENCY: ICD-10-CM

## 2020-05-13 DIAGNOSIS — Z00.00 MEDICARE ANNUAL WELLNESS VISIT, SUBSEQUENT: Primary | ICD-10-CM

## 2020-05-13 DIAGNOSIS — E11.65 TYPE 2 DIABETES MELLITUS WITH HYPERGLYCEMIA, WITH LONG-TERM CURRENT USE OF INSULIN (HCC): ICD-10-CM

## 2020-05-13 DIAGNOSIS — I10 ESSENTIAL HYPERTENSION: ICD-10-CM

## 2020-05-13 PROCEDURE — 3008F BODY MASS INDEX DOCD: CPT | Performed by: INTERNAL MEDICINE

## 2020-05-13 PROCEDURE — 1160F RVW MEDS BY RX/DR IN RCRD: CPT | Performed by: INTERNAL MEDICINE

## 2020-05-13 PROCEDURE — 1170F FXNL STATUS ASSESSED: CPT | Performed by: INTERNAL MEDICINE

## 2020-05-13 PROCEDURE — 3060F POS MICROALBUMINURIA REV: CPT | Performed by: INTERNAL MEDICINE

## 2020-05-13 PROCEDURE — G0438 PPPS, INITIAL VISIT: HCPCS | Performed by: INTERNAL MEDICINE

## 2020-05-13 PROCEDURE — 3075F SYST BP GE 130 - 139MM HG: CPT | Performed by: INTERNAL MEDICINE

## 2020-05-13 PROCEDURE — 3046F HEMOGLOBIN A1C LEVEL >9.0%: CPT | Performed by: INTERNAL MEDICINE

## 2020-05-13 PROCEDURE — 4040F PNEUMOC VAC/ADMIN/RCVD: CPT | Performed by: INTERNAL MEDICINE

## 2020-05-13 PROCEDURE — 99214 OFFICE O/P EST MOD 30 MIN: CPT | Performed by: INTERNAL MEDICINE

## 2020-05-13 PROCEDURE — 3078F DIAST BP <80 MM HG: CPT | Performed by: INTERNAL MEDICINE

## 2020-05-13 PROCEDURE — 1125F AMNT PAIN NOTED PAIN PRSNT: CPT | Performed by: INTERNAL MEDICINE

## 2020-05-13 PROCEDURE — 1036F TOBACCO NON-USER: CPT | Performed by: INTERNAL MEDICINE

## 2020-05-13 PROCEDURE — 2022F DILAT RTA XM EVC RTNOPTHY: CPT | Performed by: INTERNAL MEDICINE

## 2020-06-12 DIAGNOSIS — Z79.4 TYPE 2 DIABETES MELLITUS WITH COMPLICATION, WITH LONG-TERM CURRENT USE OF INSULIN (HCC): ICD-10-CM

## 2020-06-12 DIAGNOSIS — E11.9 TYPE 2 DIABETES MELLITUS WITHOUT COMPLICATION, WITHOUT LONG-TERM CURRENT USE OF INSULIN (HCC): ICD-10-CM

## 2020-06-12 DIAGNOSIS — E11.8 TYPE 2 DIABETES MELLITUS WITH COMPLICATION, WITH LONG-TERM CURRENT USE OF INSULIN (HCC): ICD-10-CM

## 2020-06-17 DIAGNOSIS — E11.9 TYPE 2 DIABETES MELLITUS WITHOUT COMPLICATION, WITHOUT LONG-TERM CURRENT USE OF INSULIN (HCC): ICD-10-CM

## 2020-06-17 DIAGNOSIS — I10 ESSENTIAL HYPERTENSION: ICD-10-CM

## 2020-06-17 RX ORDER — LISINOPRIL AND HYDROCHLOROTHIAZIDE 20; 12.5 MG/1; MG/1
1 TABLET ORAL DAILY
Qty: 90 TABLET | Refills: 1 | Status: SHIPPED | OUTPATIENT
Start: 2020-06-17 | End: 2020-12-28 | Stop reason: SDUPTHER

## 2020-08-12 ENCOUNTER — APPOINTMENT (OUTPATIENT)
Dept: LAB | Facility: MEDICAL CENTER | Age: 70
End: 2020-08-12
Payer: MEDICARE

## 2020-08-12 DIAGNOSIS — E11.65 TYPE 2 DIABETES MELLITUS WITH HYPERGLYCEMIA, WITH LONG-TERM CURRENT USE OF INSULIN (HCC): ICD-10-CM

## 2020-08-12 DIAGNOSIS — Z79.4 TYPE 2 DIABETES MELLITUS WITH HYPERGLYCEMIA, WITH LONG-TERM CURRENT USE OF INSULIN (HCC): ICD-10-CM

## 2020-08-12 DIAGNOSIS — E78.00 HYPERCHOLESTEROLEMIA: ICD-10-CM

## 2020-08-12 LAB
ALBUMIN SERPL BCP-MCNC: 3.7 G/DL (ref 3.5–5)
ALP SERPL-CCNC: 63 U/L (ref 46–116)
ALT SERPL W P-5'-P-CCNC: 23 U/L (ref 12–78)
ANION GAP SERPL CALCULATED.3IONS-SCNC: 9 MMOL/L (ref 4–13)
AST SERPL W P-5'-P-CCNC: 17 U/L (ref 5–45)
BILIRUB SERPL-MCNC: 0.5 MG/DL (ref 0.2–1)
BUN SERPL-MCNC: 15 MG/DL (ref 5–25)
CALCIUM SERPL-MCNC: 9.5 MG/DL (ref 8.3–10.1)
CHLORIDE SERPL-SCNC: 103 MMOL/L (ref 100–108)
CHOLEST SERPL-MCNC: 175 MG/DL (ref 50–200)
CO2 SERPL-SCNC: 27 MMOL/L (ref 21–32)
CREAT SERPL-MCNC: 0.77 MG/DL (ref 0.6–1.3)
GFR SERPL CREATININE-BSD FRML MDRD: 79 ML/MIN/1.73SQ M
GLUCOSE P FAST SERPL-MCNC: 123 MG/DL (ref 65–99)
HDLC SERPL-MCNC: 39 MG/DL
LDLC SERPL CALC-MCNC: 95 MG/DL (ref 0–100)
NONHDLC SERPL-MCNC: 136 MG/DL
POTASSIUM SERPL-SCNC: 3.7 MMOL/L (ref 3.5–5.3)
PROT SERPL-MCNC: 7.2 G/DL (ref 6.4–8.2)
SODIUM SERPL-SCNC: 139 MMOL/L (ref 136–145)
TRIGL SERPL-MCNC: 205 MG/DL

## 2020-08-12 PROCEDURE — 80061 LIPID PANEL: CPT

## 2020-08-12 PROCEDURE — 36415 COLL VENOUS BLD VENIPUNCTURE: CPT

## 2020-08-12 PROCEDURE — 83036 HEMOGLOBIN GLYCOSYLATED A1C: CPT

## 2020-08-12 PROCEDURE — 80053 COMPREHEN METABOLIC PANEL: CPT

## 2020-08-13 ENCOUNTER — OFFICE VISIT (OUTPATIENT)
Dept: INTERNAL MEDICINE CLINIC | Facility: CLINIC | Age: 70
End: 2020-08-13
Payer: MEDICARE

## 2020-08-13 VITALS
RESPIRATION RATE: 12 BRPM | SYSTOLIC BLOOD PRESSURE: 124 MMHG | BODY MASS INDEX: 29.64 KG/M2 | DIASTOLIC BLOOD PRESSURE: 70 MMHG | HEIGHT: 61 IN | WEIGHT: 157 LBS | TEMPERATURE: 98 F | HEART RATE: 70 BPM

## 2020-08-13 DIAGNOSIS — E11.65 TYPE 2 DIABETES MELLITUS WITH HYPERGLYCEMIA, WITH LONG-TERM CURRENT USE OF INSULIN (HCC): Primary | ICD-10-CM

## 2020-08-13 DIAGNOSIS — Z79.4 TYPE 2 DIABETES MELLITUS WITH HYPERGLYCEMIA, WITH LONG-TERM CURRENT USE OF INSULIN (HCC): Primary | ICD-10-CM

## 2020-08-13 DIAGNOSIS — K21.00 GASTROESOPHAGEAL REFLUX DISEASE WITH ESOPHAGITIS: ICD-10-CM

## 2020-08-13 DIAGNOSIS — E78.00 HYPERCHOLESTEROLEMIA: ICD-10-CM

## 2020-08-13 DIAGNOSIS — I10 ESSENTIAL HYPERTENSION: ICD-10-CM

## 2020-08-13 LAB
EST. AVERAGE GLUCOSE BLD GHB EST-MCNC: 252 MG/DL
HBA1C MFR BLD: 10.4 %

## 2020-08-13 PROCEDURE — 3008F BODY MASS INDEX DOCD: CPT | Performed by: INTERNAL MEDICINE

## 2020-08-13 PROCEDURE — 2022F DILAT RTA XM EVC RTNOPTHY: CPT | Performed by: INTERNAL MEDICINE

## 2020-08-13 PROCEDURE — 99214 OFFICE O/P EST MOD 30 MIN: CPT | Performed by: INTERNAL MEDICINE

## 2020-08-13 PROCEDURE — 3078F DIAST BP <80 MM HG: CPT | Performed by: INTERNAL MEDICINE

## 2020-08-13 PROCEDURE — 3074F SYST BP LT 130 MM HG: CPT | Performed by: INTERNAL MEDICINE

## 2020-08-13 PROCEDURE — 1160F RVW MEDS BY RX/DR IN RCRD: CPT | Performed by: INTERNAL MEDICINE

## 2020-08-13 PROCEDURE — 1036F TOBACCO NON-USER: CPT | Performed by: INTERNAL MEDICINE

## 2020-08-13 PROCEDURE — 3060F POS MICROALBUMINURIA REV: CPT | Performed by: INTERNAL MEDICINE

## 2020-08-13 PROCEDURE — 4040F PNEUMOC VAC/ADMIN/RCVD: CPT | Performed by: INTERNAL MEDICINE

## 2020-08-13 PROCEDURE — 3046F HEMOGLOBIN A1C LEVEL >9.0%: CPT | Performed by: INTERNAL MEDICINE

## 2020-08-13 NOTE — PROGRESS NOTES
[facility] [place]    NAME: Bridger Pulido  AGE: 72 y o  SEX: female    DATE OF ENCOUNTER: 8/13/2020    Assessment and Plan     1  Type 2 diabetes mellitus with hyperglycemia, with long-term current use of insulin (HCC)  - very poorly controlled  Hemoglobin A1c has worsened today to 10 4  -  Discussed dietary and lifestyle modifications at length with the patient  - microalbumin to creatinine ratio of 18 in May of 2020  - up-to-date on eye exam  - up-to-date on foot exam  Plan:  -Will order new glucometer and diabetic supplies for the patient  Emphasized importance of checking blood glucose on a daily basis while on insulin therapy  -Continue with lantus, metformin for now  Patient does not wish to escalate therapy for diabetes at this time and will attempt aggressive lifestyle modifications    2  Hypercholesterolemia  - relatively well controlled  Last lipid panel 8/12 -  Total cholesterol 175, triglycerides expectedly elevated at 205, HDL 39, LDL 95  - continue with rosuvastatin    3  Gastroesophageal reflux disease with esophagitis  - symptoms well controlled  Patient has follow-up with GI for history of GERD and mild gastroparesis  - continue with Prilosec    4  Essential hypertension  - well controlled  /70 today  - continue with lisinopril-HCTZ    No orders of the defined types were placed in this encounter  Chief Complaint     Chief Complaint   Patient presents with    Follow-up       History of Present Illness      Patient is a 23-year-old female with a past medical history of poorly controlled insulin-dependent type 2 diabetes, mild gastroparesis, GERD, essential hypertension who presents for 3 month follow-up appointment     patient's primary concern today is her poorly controlled type 2 diabetes  Her point of care A1c is 10 4 today and was previously 9 4 in May  The patient continues to have a very poor diet at home    She states that she ate [employer] earlier this afternoon  Her diet is heavy in carbohydrates and candy  She states that she eats sugar free Jell-O before bedtime  She tries to stay active at work with walking and climbing stairs but does not have a dedicated exercise regimen outside of work  She has been compliant with her home Lantus and metformin  She states that she has been feeling well at home  Urinates fairly frequently which is at baseline for her  Denies any polyphagia or polydipsia  She does not have a working glucometer at home and has not been checking her blood glucose  We discussed at length  The importance of achieving better control of her diabetes and discussed the implications and adverse health effects of poorly controlled diabetes  The patient expressed her understanding of this  She understands that she needs to make significant changes to her diet and lifestyle in order to improve her diabetes and achieve better overall health  We discussed adding potential therapies for diabetes including SG LT2 inhibitors, GLP1 receptor agonists, adding prandial insulin  Patient had previously been on Januvia which was expensive and apparently ineffective for her  She states that she had a friend who is on Jardiance and that he had a poor experience with this as he was frequently urinating and lost 10 lb over 3 weeks    I explained that this was the intended mechanism of the medication but she expressed some hesitancy to starting this as she stated that she already is urinating quite frequently at baseline      The following portions of the patient's history were reviewed and updated as appropriate: allergies, current medications, past family history, past medical history, past social history, past surgical history and problem list     Review of Systems     10 point ROS negative except per HPI    Active Problem List     Patient Active Problem List   Diagnosis    Allergic rhinitis    Hypertension    Gastroesophageal reflux disease with esophagitis    Hypercholesterolemia    Type 2 diabetes mellitus with hyperglycemia (HCC)    Vitamin D deficiency    Wrist joint pain    Sciatica of right side    Influenza       Objective     /70 (BP Location: Left arm, Patient Position: Sitting, Cuff Size: Standard)   Pulse 70   Temp 98 °F (36 7 °C)   Resp 12   Ht 5' 1" (1 549 m)   Wt 71 2 kg (157 lb)   BMI 29 66 kg/m²     Physical Exam  Constitutional:       Appearance: Normal appearance  She is not ill-appearing or diaphoretic  HENT:      Head: Normocephalic and atraumatic  Mouth/Throat:      Pharynx: No oropharyngeal exudate or posterior oropharyngeal erythema  Cardiovascular:      Rate and Rhythm: Normal rate and regular rhythm  Heart sounds: No murmur  No friction rub  Pulmonary:      Effort: Pulmonary effort is normal  No respiratory distress  Breath sounds: Normal breath sounds  No wheezing  Abdominal:      General: Abdomen is flat  There is no distension  Palpations: Abdomen is soft  Tenderness: There is no abdominal tenderness  Neurological:      Mental Status: She is alert  Pertinent Laboratory/Diagnostic Studies:  No results found      Images and diagnostics reviewed     Current Medications     Current Outpatient Medications:     insulin glargine (Basaglar KwikPen) 100 units/mL injection pen, Inject 40 Units under the skin daily at bedtime, Disp: 5 pen, Rfl: 1    Insulin Pen Needle (Easy Touch Pen Needles) 31G X 8 MM MISC, by Does not apply route daily, Disp: 100 each, Rfl: 1    lisinopril-hydrochlorothiazide (PRINZIDE,ZESTORETIC) 20-12 5 MG per tablet, Take 1 tablet by mouth daily, Disp: 90 tablet, Rfl: 1    metFORMIN (GLUCOPHAGE) 500 mg tablet, Take 2 tablets (1,000 mg total) by mouth 2 (two) times a day, Disp: 180 tablet, Rfl: 1    omeprazole (PriLOSEC) 40 MG capsule, Take 1 capsule (40 mg total) by mouth daily before breakfast, Disp: 90 capsule, Rfl: 3    rosuvastatin (CRESTOR) 10 MG tablet, Take 1 tablet (10 mg total) by mouth daily, Disp: 90 tablet, Rfl: 3    Health Maintenance     Health Maintenance   Topic Date Due    Influenza Vaccine  07/01/2020    BMI: Followup Plan  10/24/2020    HEMOGLOBIN A1C  11/12/2020    DTaP,Tdap,and Td Vaccines (1 - Tdap) 06/14/2026 (Originally 4/10/1971)    Diabetic Foot Exam  01/30/2021    Fall Risk  05/13/2021    Depression Screening PHQ  05/13/2021    Medicare Annual Wellness Visit (AWV)  05/13/2021    BMI: Adult  05/13/2021    DM Eye Exam  06/04/2021    MAMMOGRAM  11/19/2021    Colonoscopy Surveillance  02/11/2025    Colorectal Cancer Screening  02/11/2030    Hepatitis C Screening  Completed    Pneumococcal Vaccine: 65+ Years  Completed    HIB Vaccine  Aged Out    Hepatitis B Vaccine  Aged Out    IPV Vaccine  Aged Out    Hepatitis A Vaccine  Aged Out    Meningococcal ACWY Vaccine  Aged Out    HPV Vaccine  Aged [employer] History   Administered Date(s) Administered    INFLUENZA 11/26/2014, 09/10/2015, 11/17/2016, 10/06/2017, 09/13/2018, 09/13/2019    Influenza Quadrivalent Preservative Free 3 years and older IM 11/26/2014    Influenza Quadrivalent, 6-35 Months IM 09/10/2015    Influenza Split High Dose Preservative Free IM 10/06/2017    Influenza TIV (IM) 11/07/2011, 10/23/2013, 11/17/2016    Pneumococcal Conjugate 13-Valent 03/01/2017    Pneumococcal Polysaccharide PPV23 08/29/2018    Td (adult), adsorbed 10/26/2016       [facility]  Internal Medicine PGY-3

## 2020-08-13 NOTE — PATIENT INSTRUCTIONS
Type 2 Diabetes in Adults   WHAT YOU NEED TO KNOW:   Type 2 diabetes is a disease that affects how your body uses glucose (sugar)  Normally, when the blood sugar level increases, the pancreas makes more insulin  Insulin helps move sugar out of the blood so it can be used for energy  Type 2 diabetes develops because either the body cannot make enough insulin, or it cannot use the insulin correctly  After many years, your pancreas may stop making insulin  DISCHARGE INSTRUCTIONS:   Call 911 for any of the following:   · You have any of the following signs of a stroke:      ¨ Numbness or drooping on one side of your face     ¨ Weakness in an arm or leg    ¨ Confusion or difficulty speaking    ¨ Dizziness, a severe headache, or vision loss    · You have any of the following signs of a heart attack:      ¨ Squeezing, pressure, or pain in your chest that lasts longer than 5 minutes or returns    ¨ Discomfort or pain in your back, neck, jaw, stomach, or arm     ¨ Trouble breathing    ¨ Nausea or vomiting    ¨ Lightheadedness or a sudden cold sweat, especially with chest pain or trouble breathing  Return to the emergency department if:   · You have severe abdominal pain, or the pain spreads to your back  You may also be vomiting  · You have trouble staying awake or focusing  · You are shaking or sweating  · You have blurred or double vision  · Your breath has a fruity, sweet smell  · Your breathing is deep and labored, or rapid and shallow  · Your heartbeat is fast and weak  Contact your healthcare provider if:   · You are vomiting or have diarrhea  · You have an upset stomach and cannot eat the foods on your meal plan  · You feel weak or more tired than usual      · You feel dizzy, have headaches, or are easily irritated  · Your skin is red, warm, dry, or swollen  · You have a wound that does not heal      · You have numbness in your arms or legs       · You have trouble coping with your illness, or you feel anxious or depressed  · You have questions or concerns about your condition or care  Medicines: You may  need any of the following:  · Hypoglycemic medicines or insulin  may be given to decrease the amount of sugar in your blood  · Blood pressure medicine  may be given to lower your blood pressure  Your blood pressure should be less than 140/90  · Cholesterol lowering medicine  may be given to prevent heart disease  · Antiplatelets , such as aspirin, help prevent blood clots  Take your antiplatelet medicine exactly as directed  These medicines make it more likely for you to bleed or bruise  If you are told to take aspirin, do not take acetaminophen or ibuprofen instead  · Take your medicine as directed  Contact your healthcare provider if you think your medicine is not helping or if you have side effects  Tell him or her if you are allergic to any medicine  Keep a list of the medicines, vitamins, and herbs you take  Include the amounts, and when and why you take them  Bring the list or the pill bottles to follow-up visits  Carry your medicine list with you in case of an emergency  Check your blood sugar level as directed: You will be taught how to use a glucose monitor  Ask your healthcare provider when and how often to check during the day  You will need to check your blood sugar level at least 3 times each day if you are on insulin  If you check your blood sugar level before a meal , it should be between 80 and 130 mg/dL  If you check your blood sugar level 1 to 2 hours after a meal , it should be less than 180 mg/dL  Ask your healthcare provider if these are good goals for you  Write down your results, and show them to your healthcare provider  He may use the results to make changes to your medicine, food, or exercise schedules  If your blood sugar level is too low: Your blood sugar level is too low if it goes below 70 mg/dL   If the level is too low, eat or drink 15 grams of fast-acting carbohydrate  These are found naturally in fruits  Fast-acting carbohydrates will raise your blood sugar level quickly  Examples of 15 grams of fast-acting carbohydrate are 4 ounces (½ cup) of fruit juice or 4 ounces of regular soda  Other examples are 2 tablespoons of raisins or 3 to 4 glucose tablets  Check your blood sugar level 15 minutes later  If the level is still low (less than 100 mg/dL), eat another 15 grams of carbohydrate  When the level returns to 100 mg/dL, eat a snack or meal that contains carbohydrates  This will help prevent another drop in blood sugar  Always carefully follow your healthcare provider's instructions on how to treat low blood sugar levels  Wear medical alert identification:  Wear medical alert jewelry or carry a card that says you have diabetes  Ask your healthcare provider where to get these items  Check your feet each day for sores:  Wear shoes and socks that fit correctly  Do not trim your toenails  Ask your healthcare provider for more information about foot care  Maintain a healthy weight:  Ask your healthcare provider how much you should weigh  A healthy weight can help you control your diabetes  Ask your provider to help you create a weight loss plan if you are overweight  Together you can set manageable weight loss goals  Follow your meal plan:  A dietitian will help you make a meal plan to keep your blood sugar level steady  Do not skip meals  Your blood sugar level may drop too low if you have taken diabetes medicine and do not eat  · Keep track of carbohydrates (sugar and starchy foods)  Your blood sugar level can get too high if you eat too many carbohydrates  Your dietitian will help you plan meals and snacks that have the right amount of carbohydrates  · Eat low-fat foods , such as skinless chicken and low-fat milk  · Eat less sodium (salt)    Limit high-sodium foods, such as soy sauce, potato chips, and soup  Do not add salt to food you cook  Limit your use of table salt  You should have less than 2,300 mg of sodium per day  · Eat high-fiber foods , such as vegetables, whole grain breads, and beans  · Limit alcohol  Alcohol affects your blood sugar level and can make it harder to manage your diabetes  Limit alcohol to 1 drink a day if you are a woman  Limit alcohol to 2 drinks a day if you are a man  A drink of alcohol is 12 ounces of beer, 5 ounces of wine, or 1½ ounces of liquor  Exercise as directed:  Exercise can help keep your blood sugar level steady, decrease your risk of heart disease, and help you lose weight  Stretch before and after you exercise  Exercise for at least 150 minutes every week  Spread this amount of exercise over at least 3 days a week  Do not skip exercise more than 2 days in a row  Include muscle strengthening activities 2 to 3 days each week  Older adults should include balance training 2 to 3 times each week  Activities that help increase balance include yoga and varsha chi  Work with your healthcare provider to create an exercise plan  · Check your blood sugar level before and after exercise  Healthcare providers may tell you to change the amount of insulin you take or food you eat  If your blood sugar level is high, check your blood or urine for ketones before you exercise  Do not exercise if your blood sugar level is high and you have ketones  · If your blood sugar level is less than 100 mg/dL, have a carbohydrate snack before you exercise  Examples are 4 to 6 crackers, ½ banana, 8 ounces (1 cup) of milk, or 4 ounces (½ cup) of juice  Drink water or liquids that do not contain sugar before, during, and after exercise  Ask your dietitian or healthcare provider which liquids you should drink when you exercise  · Do not sit for longer than 30 minutes  If you cannot walk around, at least stand up  This will help you stay active and keep your blood circulating    Do not smoke: Nicotine and other chemicals in cigarettes and cigars can cause lung damage and make it more difficult to manage your diabetes  Ask your healthcare provider for information if you currently smoke and need help to quit  Do not use e-cigarettes or smokeless tobacco in place of cigarettes or to help you quit  They still contain nicotine  Check your blood pressure as directed:  Ask your healthcare provider what your blood pressure should be  Most adults with diabetes and high blood pressure should have a systolic blood pressure (first number) less than 140  Your diastolic blood pressure (second number) should be less than 90  Ask about vaccines: You have a higher risk for serious illness if you get the flu, pneumonia, or hepatitis  Ask your healthcare provider if you should get a flu, pneumonia, or hepatitis B vaccine, and when to get the vaccine  Follow up with your healthcare provider as directed: You may need to return to have your A1c checked every 3 months  You will need to return at least once each year to have your feet checked  You will need an eye exam once a year to check for retinopathy  You will also need urine tests every year to check for kidney problems  You may need tests to monitor for heart disease such as an EKG, stress test, blood pressure monitoring, and blood tests  Write down your questions so you remember to ask them during your visits  © 2017 [employer]  Information is for End User's use only and may not be sold, redistributed or otherwise used for commercial purposes  All illustrations and images included in CareNotes® are the copyrighted property of [employer]  or [employer]  The above information is an  only  It is not intended as medical advice for individual conditions or treatments  Talk to your doctor, nurse or pharmacist before following any medical regimen to see if it is safe and effective for you

## 2020-08-26 DIAGNOSIS — E11.9 TYPE 2 DIABETES MELLITUS WITHOUT COMPLICATION, WITHOUT LONG-TERM CURRENT USE OF INSULIN (HCC): ICD-10-CM

## 2020-08-26 RX ORDER — INSULIN GLARGINE 100 [IU]/ML
40 INJECTION, SOLUTION SUBCUTANEOUS
Qty: 5 PEN | Refills: 3 | Status: SHIPPED | OUTPATIENT
Start: 2020-08-26 | End: 2020-12-30 | Stop reason: SDUPTHER

## 2020-09-10 LAB
LEFT EYE DIABETIC RETINOPATHY: NORMAL
RIGHT EYE DIABETIC RETINOPATHY: NORMAL

## 2020-10-21 LAB
LEFT EYE DIABETIC RETINOPATHY: NORMAL
RIGHT EYE DIABETIC RETINOPATHY: NORMAL

## 2020-10-29 DIAGNOSIS — K21.00 GASTROESOPHAGEAL REFLUX DISEASE WITH ESOPHAGITIS: ICD-10-CM

## 2020-10-29 RX ORDER — OMEPRAZOLE 40 MG/1
40 CAPSULE, DELAYED RELEASE ORAL
Qty: 90 CAPSULE | Refills: 3 | Status: SHIPPED | OUTPATIENT
Start: 2020-10-29 | End: 2021-10-21 | Stop reason: SDUPTHER

## 2020-11-13 DIAGNOSIS — E11.9 TYPE 2 DIABETES MELLITUS WITHOUT COMPLICATION, WITHOUT LONG-TERM CURRENT USE OF INSULIN (HCC): ICD-10-CM

## 2020-11-17 ENCOUNTER — LAB (OUTPATIENT)
Dept: LAB | Facility: MEDICAL CENTER | Age: 70
End: 2020-11-17
Payer: MEDICARE

## 2020-11-17 DIAGNOSIS — Z79.4 TYPE 2 DIABETES MELLITUS WITH HYPERGLYCEMIA, WITH LONG-TERM CURRENT USE OF INSULIN (HCC): ICD-10-CM

## 2020-11-17 DIAGNOSIS — E11.65 TYPE 2 DIABETES MELLITUS WITH HYPERGLYCEMIA, WITH LONG-TERM CURRENT USE OF INSULIN (HCC): ICD-10-CM

## 2020-11-17 PROCEDURE — 36415 COLL VENOUS BLD VENIPUNCTURE: CPT

## 2020-11-17 PROCEDURE — 83036 HEMOGLOBIN GLYCOSYLATED A1C: CPT

## 2020-11-18 ENCOUNTER — OFFICE VISIT (OUTPATIENT)
Dept: INTERNAL MEDICINE CLINIC | Facility: CLINIC | Age: 70
End: 2020-11-18
Payer: MEDICARE

## 2020-11-18 VITALS
SYSTOLIC BLOOD PRESSURE: 152 MMHG | OXYGEN SATURATION: 98 % | WEIGHT: 160.8 LBS | HEART RATE: 86 BPM | TEMPERATURE: 98 F | BODY MASS INDEX: 30.36 KG/M2 | RESPIRATION RATE: 18 BRPM | DIASTOLIC BLOOD PRESSURE: 74 MMHG | HEIGHT: 61 IN

## 2020-11-18 DIAGNOSIS — I10 ESSENTIAL HYPERTENSION: ICD-10-CM

## 2020-11-18 DIAGNOSIS — R21 RASH: ICD-10-CM

## 2020-11-18 DIAGNOSIS — Z79.4 TYPE 2 DIABETES MELLITUS WITH HYPERGLYCEMIA, WITH LONG-TERM CURRENT USE OF INSULIN (HCC): ICD-10-CM

## 2020-11-18 DIAGNOSIS — E11.65 TYPE 2 DIABETES MELLITUS WITH HYPERGLYCEMIA, WITH LONG-TERM CURRENT USE OF INSULIN (HCC): ICD-10-CM

## 2020-11-18 DIAGNOSIS — E55.9 VITAMIN D DEFICIENCY: ICD-10-CM

## 2020-11-18 DIAGNOSIS — E78.00 HYPERCHOLESTEROLEMIA: ICD-10-CM

## 2020-11-18 DIAGNOSIS — K21.00 GASTROESOPHAGEAL REFLUX DISEASE WITH ESOPHAGITIS WITHOUT HEMORRHAGE: ICD-10-CM

## 2020-11-18 DIAGNOSIS — Z23 ENCOUNTER FOR IMMUNIZATION: Primary | ICD-10-CM

## 2020-11-18 LAB
EST. AVERAGE GLUCOSE BLD GHB EST-MCNC: 229 MG/DL
HBA1C MFR BLD: 9.6 %

## 2020-11-18 PROCEDURE — 90662 IIV NO PRSV INCREASED AG IM: CPT | Performed by: INTERNAL MEDICINE

## 2020-11-18 PROCEDURE — 99214 OFFICE O/P EST MOD 30 MIN: CPT | Performed by: INTERNAL MEDICINE

## 2020-11-18 PROCEDURE — G0008 ADMIN INFLUENZA VIRUS VAC: HCPCS | Performed by: INTERNAL MEDICINE

## 2020-11-19 RX ORDER — NYSTATIN 100000 U/G
CREAM TOPICAL 2 TIMES DAILY
Qty: 30 G | Refills: 0 | Status: SHIPPED | OUTPATIENT
Start: 2020-11-19

## 2020-12-03 ENCOUNTER — APPOINTMENT (OUTPATIENT)
Dept: URGENT CARE | Facility: MEDICAL CENTER | Age: 70
End: 2020-12-03
Payer: OTHER MISCELLANEOUS

## 2020-12-03 PROCEDURE — 99283 EMERGENCY DEPT VISIT LOW MDM: CPT | Performed by: FAMILY MEDICINE

## 2020-12-03 PROCEDURE — G0382 LEV 3 HOSP TYPE B ED VISIT: HCPCS | Performed by: FAMILY MEDICINE

## 2020-12-14 DIAGNOSIS — Z79.4 TYPE 2 DIABETES MELLITUS WITH COMPLICATION, WITH LONG-TERM CURRENT USE OF INSULIN (HCC): ICD-10-CM

## 2020-12-14 DIAGNOSIS — E11.8 TYPE 2 DIABETES MELLITUS WITH COMPLICATION, WITH LONG-TERM CURRENT USE OF INSULIN (HCC): ICD-10-CM

## 2020-12-14 RX ORDER — PEN NEEDLE, DIABETIC 31 GX5/16"
NEEDLE, DISPOSABLE MISCELLANEOUS
Qty: 100 EACH | Refills: 0 | Status: SHIPPED | OUTPATIENT
Start: 2020-12-14 | End: 2021-06-07

## 2020-12-21 ENCOUNTER — OCCMED (OUTPATIENT)
Dept: URGENT CARE | Facility: MEDICAL CENTER | Age: 70
End: 2020-12-21
Payer: OTHER MISCELLANEOUS

## 2020-12-21 DIAGNOSIS — E11.9 TYPE 2 DIABETES MELLITUS WITHOUT COMPLICATION, WITHOUT LONG-TERM CURRENT USE OF INSULIN (HCC): ICD-10-CM
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
## 2020-12-21 PROCEDURE — 99213 OFFICE O/P EST LOW 20 MIN: CPT | Performed by: PHYSICIAN ASSISTANT

## 2020-12-21 RX ORDER — INSULIN GLARGINE 100 [IU]/ML
INJECTION, SOLUTION SUBCUTANEOUS
Qty: 15 ML | Refills: 0 | OUTPATIENT
Start: 2020-12-21

## 2020-12-28 DIAGNOSIS — I10 ESSENTIAL HYPERTENSION: ICD-10-CM

## 2020-12-29 RX ORDER — LISINOPRIL AND HYDROCHLOROTHIAZIDE 20; 12.5 MG/1; MG/1
1 TABLET ORAL DAILY
Qty: 90 TABLET | Refills: 1 | Status: SHIPPED | OUTPATIENT
Start: 2020-12-29 | End: 2021-06-25 | Stop reason: SDUPTHER

## 2020-12-30 RX ORDER — INSULIN GLARGINE 100 [IU]/ML
40 INJECTION, SOLUTION SUBCUTANEOUS
Qty: 5 PEN | Refills: 3 | Status: SHIPPED | OUTPATIENT
Start: 2020-12-30 | End: 2021-01-25 | Stop reason: SDUPTHER

## 2020-12-31 ENCOUNTER — TELEPHONE (OUTPATIENT)
Dept: INTERNAL MEDICINE CLINIC | Facility: CLINIC | Age: 70
End: 2020-12-31

## 2020-12-31 DIAGNOSIS — E11.65 TYPE 2 DIABETES MELLITUS WITH HYPERGLYCEMIA, WITH LONG-TERM CURRENT USE OF INSULIN (HCC): Primary | ICD-10-CM

## 2020-12-31 DIAGNOSIS — Z79.4 TYPE 2 DIABETES MELLITUS WITH HYPERGLYCEMIA, WITH LONG-TERM CURRENT USE OF INSULIN (HCC): Primary | ICD-10-CM

## 2021-01-06 DIAGNOSIS — E11.65 TYPE 2 DIABETES MELLITUS WITH HYPERGLYCEMIA, WITH LONG-TERM CURRENT USE OF INSULIN (HCC): Primary | ICD-10-CM

## 2021-01-06 DIAGNOSIS — Z79.4 TYPE 2 DIABETES MELLITUS WITH HYPERGLYCEMIA, WITH LONG-TERM CURRENT USE OF INSULIN (HCC): Primary | ICD-10-CM

## 2021-01-06 RX ORDER — BLOOD-GLUCOSE METER
EACH MISCELLANEOUS
Qty: 1 KIT | Refills: 0 | Status: SHIPPED | OUTPATIENT
Start: 2021-01-06

## 2021-01-25 DIAGNOSIS — E11.9 TYPE 2 DIABETES MELLITUS WITHOUT COMPLICATION, WITHOUT LONG-TERM CURRENT USE OF INSULIN (HCC): ICD-10-CM

## 2021-01-26 RX ORDER — INSULIN GLARGINE 100 [IU]/ML
40 INJECTION, SOLUTION SUBCUTANEOUS
Qty: 5 PEN | Refills: 3 | Status: SHIPPED | OUTPATIENT
Start: 2021-01-26 | End: 2021-06-25 | Stop reason: SDUPTHER

## 2021-02-03 ENCOUNTER — APPOINTMENT (EMERGENCY)
Dept: CT IMAGING | Facility: HOSPITAL | Age: 71
End: 2021-02-03
Payer: MEDICARE

## 2021-02-03 ENCOUNTER — HOSPITAL ENCOUNTER (EMERGENCY)
Facility: HOSPITAL | Age: 71
Discharge: HOME | End: 2021-02-03
Attending: EMERGENCY MEDICINE | Admitting: EMERGENCY MEDICINE
Payer: MEDICARE

## 2021-02-03 ENCOUNTER — TELEPHONE (OUTPATIENT)
Dept: PHYSICAL THERAPY | Facility: CLINIC | Age: 71
End: 2021-02-03

## 2021-02-03 VITALS
OXYGEN SATURATION: 100 % | TEMPERATURE: 98 F | HEIGHT: 61 IN | DIASTOLIC BLOOD PRESSURE: 62 MMHG | RESPIRATION RATE: 16 BRPM | BODY MASS INDEX: 29.55 KG/M2 | WEIGHT: 156.53 LBS | SYSTOLIC BLOOD PRESSURE: 127 MMHG | HEART RATE: 71 BPM

## 2021-02-03 DIAGNOSIS — S22.31XA CLOSED FRACTURE OF ONE RIB OF RIGHT SIDE, INITIAL ENCOUNTER: ICD-10-CM

## 2021-02-03 DIAGNOSIS — S06.0X0A CONCUSSION WITHOUT LOSS OF CONSCIOUSNESS, INITIAL ENCOUNTER: ICD-10-CM
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
## 2021-02-03 PROCEDURE — G1004 CDSM NDSC: HCPCS

## 2021-02-03 PROCEDURE — 72125 CT NECK SPINE W/O DYE: CPT

## 2021-02-03 PROCEDURE — 99284 EMERGENCY DEPT VISIT MOD MDM: CPT | Performed by: EMERGENCY MEDICINE

## 2021-02-03 PROCEDURE — 99283 EMERGENCY DEPT VISIT LOW MDM: CPT

## 2021-02-03 PROCEDURE — 70450 CT HEAD/BRAIN W/O DYE: CPT

## 2021-02-03 PROCEDURE — 71250 CT THORAX DX C-: CPT

## 2021-02-03 RX ORDER — ONDANSETRON 4 MG/1
4 TABLET, ORALLY DISINTEGRATING ORAL EVERY 6 HOURS PRN
Qty: 20 TABLET | Refills: 0 | Status: SHIPPED | OUTPATIENT
Start: 2021-02-03 | End: 2021-04-06 | Stop reason: ALTCHOICE

## 2021-02-03 RX ORDER — ONDANSETRON 4 MG/1
4 TABLET, ORALLY DISINTEGRATING ORAL ONCE
Status: COMPLETED | OUTPATIENT
Start: 2021-02-03 | End: 2021-02-03

## 2021-02-03 RX ADMIN — ONDANSETRON 4 MG: 4 TABLET, ORALLY DISINTEGRATING ORAL at 10:36

## 2021-02-03 NOTE — ED PROVIDER NOTES
History  Chief Complaint   Patient presents with    Head Injury     patient reports on monday slipped on ice and struck head, that night had visual field changes  reports dizziness, nausea, back pain, and right abdominal pain  This is a 52-year-old female with a history of hypertension, hyperlipidemia, diabetes presents after a fall  The patient states that Monday night, she slipped and fell on ice  She did strike the back of her head  Denies any loss of conscious, blood thinner use, aspirin use  States that she had visual changes for approximately 1 hour after the fall  Patient woke up this morning feeling lightheaded and nauseous  She is also complaining of neck pain and right-sided chest wall pain  None       Past Medical History:   Diagnosis Date    Diabetes mellitus ([facility] [address] )     HLD (hyperlipidemia)     Hypertension        Past Surgical History:   Procedure Laterality Date    HAND SURGERY      HYSTERECTOMY      TONSILLECTOMY         History reviewed  No pertinent family history  I have reviewed and agree with the history as documented  E-Cigarette/Vaping     E-Cigarette/Vaping Substances     Social History     Tobacco Use    Smoking status: Never Smoker    Smokeless tobacco: Never Used   Substance Use Topics    Alcohol use: Not Currently    Drug use: Not Currently       Review of Systems   Constitutional: Negative for chills and fever  HENT: Negative for rhinorrhea, sore throat and trouble swallowing  Eyes: Negative for photophobia and visual disturbance  Respiratory: Negative for cough, chest tightness and shortness of breath  Cardiovascular: Positive for chest pain  Negative for palpitations and leg swelling  Gastrointestinal: Negative for abdominal pain, blood in stool, diarrhea, nausea and vomiting  Endocrine: Negative for polyuria  Genitourinary: Negative for dysuria, flank pain, hematuria, vaginal bleeding and vaginal discharge     Musculoskeletal: Positive for neck pain  Negative for back pain  Skin: Negative for color change and rash  Allergic/Immunologic: Negative for immunocompromised state  Neurological: Positive for light-headedness and headaches  Negative for dizziness, weakness and numbness  All other systems reviewed and are negative  Physical Exam  Physical Exam  Constitutional:       Appearance: Normal appearance  She is well-developed  She is not ill-appearing or toxic-appearing  HENT:      Head: Normocephalic and atraumatic  Nose: Nose normal    Eyes:      General: Lids are normal       Conjunctiva/sclera: Conjunctivae normal       Pupils: Pupils are equal, round, and reactive to light  Neck:      Musculoskeletal: Normal range of motion and neck supple  Pain with movement and spinous process tenderness present  Trachea: Trachea normal    Cardiovascular:      Rate and Rhythm: Normal rate and regular rhythm  Pulmonary:      Effort: Pulmonary effort is normal       Breath sounds: Normal breath sounds  Chest:      Chest wall: Tenderness present  No crepitus  Comments: Right-sided chest wall tenderness without signs of trauma  Abdominal:      General: Bowel sounds are normal       Palpations: Abdomen is soft  Abdomen is not rigid  Tenderness: There is no abdominal tenderness  There is no guarding or rebound  Negative signs include Nolan's sign and McBurney's sign  Musculoskeletal:      Comments: No T-spine, L-spine tenderness  C-spine tenderness noted  No bony tenderness throughout but otherwise noted  No other evidence of trauma  Patient able to range all joints without pain  Pelvis is stable and nontender  Negative PANCHO/FADIR  Neurological:      Mental Status: She is alert and oriented to person, place, and time  GCS: GCS eye subscore is 4  GCS verbal subscore is 5  GCS motor subscore is 6  Cranial Nerves: No cranial nerve deficit  Sensory: No sensory deficit        Gait: Gait normal  Comments: Cranial nerves 2-12 intact, strength 5/5 throughout, sensation intact throughout  Normal gait  Psychiatric:         Speech: Speech normal          Behavior: Behavior normal  Behavior is cooperative  Thought Content: Thought content normal          Vital Signs  ED Triage Vitals [02/03/21 0837]   Temperature Pulse Respirations Blood Pressure SpO2   98 °F (36 7 °C) 79 18 168/70 99 %      Temp Source Heart Rate Source Patient Position - Orthostatic VS BP Location FiO2 (%)   Oral -- -- Right arm --      Pain Score       8           Vitals:    02/03/21 0837   BP: 168/70   Pulse: 79         Visual Acuity  Visual Acuity      Most Recent Value   L Pupil Size (mm)  4   R Pupil Size (mm)  4          ED Medications  Medications   ondansetron (ZOFRAN-ODT) dispersible tablet 4 mg (4 mg Oral Given 2/3/21 1036)       Diagnostic Studies  Results Reviewed     None                 CT chest without contrast   Final Result by Suzy Danielle MD (02/03 1105)      Question subtle nondisplaced fracture of the right lateral 4th rib  No hemothorax or pneumothorax  Diffuse hepatic steatosis  The study was marked in [facility] for immediate notification  Workstation performed: [id number]         CT head without contrast   Final Result by Mahendra Murphy MD (02/03 1053)      No acute intracranial abnormality  Workstation performed: [id number]         CT cervical spine without contrast   Final Result by Mahendra Murphy MD (02/03 1100)      No cervical spine fracture or traumatic malalignment  Workstation performed: [id number]                    Procedures  Procedures         ED Course                             SBIRT 22yo+      Most Recent Value   SBIRT (25 yo +)   In order to provide better care to our patients, we are screening all of our patients for alcohol and drug use  Would it be okay to ask you these screening questions?   No Filed at: 02/03/2021 1011 Initial Alcohol Screen: US AUDIT-C    1  How often do you have a drink containing alcohol?  0 Filed at: 02/03/2021 1017   2  How many drinks containing alcohol do you have on a typical day you are drinking? 0 Filed at: 02/03/2021 1017   3a  Male UNDER 65: How often do you have five or more drinks on one occasion? 0 Filed at: 02/03/2021 1017   3b  FEMALE Any Age, or MALE 65+: How often do you have 4 or more drinks on one occassion? 0 Filed at: 02/03/2021 1017   Audit-C Score  0 Filed at: 02/03/2021 1017   INGA: How many times in the past year have you    Used an illegal drug or used a prescription medication for non-medical reasons? Never Filed at: 02/03/2021 1017                    MDM  Number of Diagnoses or Management Options  Closed fracture of one rib of right side, initial encounter:   Concussion without loss of consciousness, initial encounter:   Fall, initial encounter:   Diagnosis management comments: Will check CT head, neck, chest   Zofran for nausea  Disposition pending results  Negative CT C-spine and no neurologic deficits  C-collar can be cleared  The sensitivity and specificity for clinically significant injury is 98 5% and 91 0% with a negative predictive value of 99 97%  *    Based on EAST guidelines, C-spine can be cleared based on negative CT C-spine alone  **    *Sen RODRIGUEZ et  al   "Cervical spinal clearance:  A prospective [employer] Trauma Association Multi-intitutional Trial "  Journal of Trauma Acure Care Surgery  2016; 81(6):  1122  **Of five articles with a total follow-up of 1,017 included subjects, none reported new neurologic change (paraplegia or quadriplegia) after cervical collar removal  Of 11 studies with a total of 1,718 subjects, no study reported an unstable C-spine fracture; one of the studies did not clearly report this outcome   The negative predictive value for C-spine CT was 100% for an unstable C-spine injury and 91% for any stable injury of the C-spine Disposition  Final diagnoses:   Fall, initial encounter   Concussion without loss of consciousness, initial encounter   Closed fracture of one rib of right side, initial encounter     Time reflects when diagnosis was documented in both MDM as applicable and the Disposition within this note     Time User Action Codes Description Comment    2/3/2021 11:12 AM Lisandro Stone Add [R96  [id number]] Fall, initial encounter     2/3/2021 11:12 AM Mary SAENZ Add [S06 [id number]] Concussion without loss of consciousness, initial encounter     2/3/2021 11:12 AM Lisandro Stone Add [S22 [id number]] Closed fracture of one rib of right side, initial encounter       ED Disposition     ED Disposition Condition Date/Time Comment    Discharge Stable Wed Feb 3, 2021 11:12 AM Karl Cowart discharge to home/self care  Follow-up Information     Follow up With Specialties Details Why [facility] Emergency Department Emergency Medicine Go to  If symptoms worsen [facility] [id number]  [address] [facility] [place], operated [facility], [address] [place] [address], Formerly [facility]    Physical Therapy at [address] [place] [address] Physical Therapy Schedule an appointment as soon as possible for a visit  with the concussion clinic  [facility]   [phone] Physical Therapy at [address] [place] [address], [address]          Patient's Medications   Discharge Prescriptions    ONDANSETRON (ZOFRAN-ODT) 4 MG DISINTEGRATING TABLET    Take 1 tablet (4 mg total) by mouth every 6 (six) hours as needed for nausea       Start Date: 2/3/2021  End Date: --       Order Dose: 4 mg       Quantity: 20 tablet    Refills: 0     No discharge procedures on file      PDMP Review     None          ED Provider  Electronically Signed by           Rochelle Mohr MD  02/03/21 [address]

## 2021-02-10 ENCOUNTER — IMMUNIZATIONS (OUTPATIENT)
Dept: FAMILY MEDICINE CLINIC | Facility: HOSPITAL | Age: 71
End: 2021-02-10

## 2021-02-10 DIAGNOSIS — Z23 ENCOUNTER FOR IMMUNIZATION: Primary | ICD-10-CM

## 2021-02-10 PROCEDURE — 91301 SARS-COV-2 / COVID-19 MRNA VACCINE (MODERNA) 100 MCG: CPT

## 2021-02-10 PROCEDURE — 0011A SARS-COV-2 / COVID-19 MRNA VACCINE (MODERNA) 100 MCG: CPT

## 2021-03-09 ENCOUNTER — IMMUNIZATIONS (OUTPATIENT)
Dept: FAMILY MEDICINE CLINIC | Facility: HOSPITAL | Age: 71
End: 2021-03-09

## 2021-03-09 DIAGNOSIS — Z23 ENCOUNTER FOR IMMUNIZATION: Primary | ICD-10-CM

## 2021-03-09 PROCEDURE — 0012A SARS-COV-2 / COVID-19 MRNA VACCINE (MODERNA) 100 MCG: CPT

## 2021-03-09 PROCEDURE — 91301 SARS-COV-2 / COVID-19 MRNA VACCINE (MODERNA) 100 MCG: CPT

## 2021-04-02 ENCOUNTER — APPOINTMENT (OUTPATIENT)
Dept: LAB | Facility: MEDICAL CENTER | Age: 71
End: 2021-04-02
Payer: MEDICARE

## 2021-04-02 LAB
ALBUMIN SERPL BCP-MCNC: 3.7 G/DL (ref 3.5–5)
ALP SERPL-CCNC: 59 U/L (ref 46–116)
ALT SERPL W P-5'-P-CCNC: 26 U/L (ref 12–78)
ANION GAP SERPL CALCULATED.3IONS-SCNC: 5 MMOL/L (ref 4–13)
AST SERPL W P-5'-P-CCNC: 12 U/L (ref 5–45)
BILIRUB SERPL-MCNC: 0.26 MG/DL (ref 0.2–1)
BUN SERPL-MCNC: 19 MG/DL (ref 5–25)
CALCIUM SERPL-MCNC: 9.3 MG/DL (ref 8.3–10.1)
CHLORIDE SERPL-SCNC: 106 MMOL/L (ref 100–108)
CHOLEST SERPL-MCNC: 200 MG/DL (ref 50–200)
CO2 SERPL-SCNC: 28 MMOL/L (ref 21–32)
CREAT SERPL-MCNC: 1.07 MG/DL (ref 0.6–1.3)
EST. AVERAGE GLUCOSE BLD GHB EST-MCNC: 243 MG/DL
GFR SERPL CREATININE-BSD FRML MDRD: 53 ML/MIN/1.73SQ M
GLUCOSE P FAST SERPL-MCNC: 199 MG/DL (ref 65–99)
HBA1C MFR BLD: 10.1 %
HDLC SERPL-MCNC: 37 MG/DL
LDLC SERPL CALC-MCNC: 127 MG/DL (ref 0–100)
NONHDLC SERPL-MCNC: 163 MG/DL
POTASSIUM SERPL-SCNC: 4.2 MMOL/L (ref 3.5–5.3)
PROT SERPL-MCNC: 7.1 G/DL (ref 6.4–8.2)
SODIUM SERPL-SCNC: 139 MMOL/L (ref 136–145)
TRIGL SERPL-MCNC: 182 MG/DL

## 2021-04-02 PROCEDURE — 80053 COMPREHEN METABOLIC PANEL: CPT | Performed by: INTERNAL MEDICINE

## 2021-04-02 PROCEDURE — 80061 LIPID PANEL: CPT | Performed by: INTERNAL MEDICINE

## 2021-04-02 PROCEDURE — 36415 COLL VENOUS BLD VENIPUNCTURE: CPT | Performed by: INTERNAL MEDICINE

## 2021-04-02 PROCEDURE — 83036 HEMOGLOBIN GLYCOSYLATED A1C: CPT | Performed by: INTERNAL MEDICINE

## 2021-04-06 ENCOUNTER — OFFICE VISIT (OUTPATIENT)
Dept: INTERNAL MEDICINE CLINIC | Facility: CLINIC | Age: 71
End: 2021-04-06
Payer: MEDICARE

## 2021-04-06 VITALS
HEART RATE: 101 BPM | OXYGEN SATURATION: 97 % | SYSTOLIC BLOOD PRESSURE: 150 MMHG | BODY MASS INDEX: 29.64 KG/M2 | WEIGHT: 157 LBS | RESPIRATION RATE: 18 BRPM | DIASTOLIC BLOOD PRESSURE: 60 MMHG | TEMPERATURE: 96.7 F | HEIGHT: 61 IN

## 2021-04-06 DIAGNOSIS — E78.00 HYPERCHOLESTEROLEMIA: ICD-10-CM

## 2021-04-06 DIAGNOSIS — I10 ESSENTIAL HYPERTENSION: ICD-10-CM

## 2021-04-06 DIAGNOSIS — E55.9 VITAMIN D DEFICIENCY: ICD-10-CM

## 2021-04-06 DIAGNOSIS — E11.65 TYPE 2 DIABETES MELLITUS WITH HYPERGLYCEMIA, WITH LONG-TERM CURRENT USE OF INSULIN (HCC): Primary | ICD-10-CM

## 2021-04-06 DIAGNOSIS — Z79.4 TYPE 2 DIABETES MELLITUS WITH HYPERGLYCEMIA, WITH LONG-TERM CURRENT USE OF INSULIN (HCC): Primary | ICD-10-CM

## 2021-04-06 PROBLEM — J11.1 INFLUENZA: Status: RESOLVED | Noted: 2020-02-12 | Resolved: 2021-04-06

## 2021-04-06 PROCEDURE — 99214 OFFICE O/P EST MOD 30 MIN: CPT | Performed by: INTERNAL MEDICINE

## 2021-04-07 NOTE — PROGRESS NOTES
[facility] [place]      NAME: Samina Dockery  AGE: 72 y o  SEX: female  : 1950   MRN: [id number]    DATE: 2021  TIME: 12:12 PM    Assessment and Plan   1  Type 2 diabetes mellitus with hyperglycemia, with long-term current use of insulin ([address] )    The patient's diabetes is not well controlled  She has been reluctant to change her medication because of cost considerations  She has tried to make adjustments in her diet and activity level but unfortunately this has failed  I suggested that she pursue endocrinology evaluation   - Ambulatory referral to Endocrinology; Future    2  Essential hypertension    Well controlled  3  Hypercholesterolemia    Controlled on rosuvastatin  4  Vitamin D deficiency    On replacement  5  BMI   BMI Counseling: Body mass index is 29 66 kg/m²  The BMI is above normal  Nutrition recommendations include reducing portion sizes and moderation in carbohydrate intake  Exercise recommendations include moderate aerobic physical activity for 150 minutes/week  Clinically, the patient is doing pretty well  Hopefully, her diabetic control can be improved  She was successful in getting her COVID vaccine  Return to office in: Three months    Chief Complaint     Chief Complaint   Patient presents with    Follow-up     3 month        History of Present Illness      the patient returned to the office for re-evaluation of hypertension, hypercholesterolemia, and type 2 diabetes  Since her last visit she has been feeling well  She has had no chest pain, shortness of breath, palpitations, or dizziness  She has been trying to watch her diet  She has not been able to lose weight  She has had no hypoglycemia  She is tolerating her medications well      The following portions of the patient's history were reviewed and updated as appropriate: allergies, current medications, past family history, past medical history, past social history, past surgical history and problem list     Review of Systems   Review of Systems   Constitutional: Negative  HENT: Negative for congestion, ear pain, postnasal drip, rhinorrhea, sore throat and trouble swallowing  Eyes: Negative for pain, discharge, redness and visual disturbance  Respiratory: Negative for cough, shortness of breath and wheezing  Cardiovascular: Negative  Gastrointestinal: Negative  Endocrine: Negative  Genitourinary: Negative for difficulty urinating, dysuria, frequency, hematuria and urgency  Musculoskeletal: Negative for arthralgias, gait problem, joint swelling and myalgias  Skin: Negative for rash  Neurological: Negative for dizziness, speech difficulty, weakness, light-headedness, numbness and headaches  Hematological: Negative  Psychiatric/Behavioral: Negative for confusion, decreased concentration, dysphoric mood and sleep disturbance  The patient is not nervous/anxious  Active Problem List     Patient Active Problem List   Diagnosis    Allergic rhinitis    Hypertension    Gastroesophageal reflux disease with esophagitis    Hypercholesterolemia    Type 2 diabetes mellitus with hyperglycemia (HCC)    Vitamin D deficiency    Wrist joint pain    Sciatica of right side       Objective   /60 (BP Location: Left arm, Patient Position: Sitting, Cuff Size: Standard)   Pulse 101   Temp (!) 96 7 °F (35 9 °C)   Resp 18   Ht 5' 1" (1 549 m)   Wt 71 2 kg (157 lb)   SpO2 97%   BMI 29 66 kg/m²     Physical Exam  Constitutional:       General: She is not in acute distress  Appearance: She is well-developed  HENT:      Head: Normocephalic and atraumatic  Neck:      Musculoskeletal: Neck supple  Thyroid: No thyromegaly  Vascular: No JVD  Trachea: No tracheal deviation  Cardiovascular:      Rate and Rhythm: Normal rate and regular rhythm        Pulses: no weak pulses          Dorsalis pedis pulses are 2+ on the right side and 2+ on the left side         Posterior tibial pulses are 2+ on the right side and 2+ on the left side  Heart sounds: Normal heart sounds  No murmur  No friction rub  No gallop  Pulmonary:      Effort: Pulmonary effort is normal       Breath sounds: Normal breath sounds  No wheezing or rales  Chest:      Chest wall: No tenderness  Abdominal:      General: Bowel sounds are normal  There is no distension  Palpations: Abdomen is soft  There is no mass  Tenderness: There is no abdominal tenderness  There is no rebound  Musculoskeletal: Normal range of motion  General: No tenderness  Feet:      Right foot:      Skin integrity: No ulcer, skin breakdown, erythema, warmth, callus or dry skin  Left foot:      Skin integrity: No ulcer, skin breakdown, erythema, warmth, callus or dry skin  Lymphadenopathy:      Cervical: No cervical adenopathy  Skin:     General: Skin is warm and dry  Neurological:      Mental Status: She is alert and oriented to person, place, and time  Psychiatric:         Mood and Affect: Mood normal          Behavior: Behavior normal          Thought Content: Thought content normal          Judgment: Judgment normal      Patient's shoes and socks removed  Right Foot/Ankle   Right Foot Inspection  Skin Exam: skin normal and skin intact no dry skin, no warmth, no callus, no erythema, no maceration, no abnormal color, no pre-ulcer, no ulcer and no callus                          Toe Exam: ROM and strength within normal limitsno swelling, no tenderness, erythema and  no right toe deformity  Sensory   Vibration: intact  Proprioception: intact   Monofilament testing: intact  Vascular  Capillary refills: < 3 seconds  The right DP pulse is 2+  The right PT pulse is 2+       Left Foot/Ankle  Left Foot Inspection  Skin Exam: skin normal and skin intactno dry skin, no warmth, no erythema, no maceration, normal color, no pre-ulcer, no ulcer and no callus                         Toe Exam: ROM and strength within normal limitsno swelling, no tenderness, no erythema and no left toe deformity                   Sensory   Vibration: intact  Proprioception: intact  Monofilament: intact  Vascular  Capillary refills: < 3 seconds  The left DP pulse is 2+  The left PT pulse is 2+  Assign Risk Category:  No deformity present; No loss of protective sensation; No weak pulses       Risk: 0      Pertinent Laboratory/Diagnostic Studies:  No visits with results within 3 Month(s) from this visit  Latest known visit with results is:   Office Visit on 11/18/2020   Component Date Value Ref Range Status    Sodium 04/02/2021 139  136 - 145 mmol/L Final    Potassium 04/02/2021 4 2  3 5 - 5 3 mmol/L Final    Chloride 04/02/2021 106  100 - 108 mmol/L Final    CO2 04/02/2021 28  21 - 32 mmol/L Final    ANION GAP 04/02/2021 5  4 - 13 mmol/L Final    BUN 04/02/2021 19  5 - 25 mg/dL Final    Creatinine 04/02/2021 1 07  0 60 - 1 30 mg/dL Final    Glucose, Fasting 04/02/2021 199* 65 - 99 mg/dL Final    Calcium 04/02/2021 9 3  8 3 - 10 1 mg/dL Final    AST 04/02/2021 12  5 - 45 U/L Final    ALT 04/02/2021 26  12 - 78 U/L Final    Alkaline Phosphatase 04/02/2021 59  46 - 116 U/L Final    Total Protein 04/02/2021 7 1  6 4 - 8 2 g/dL Final    Albumin 04/02/2021 3 7  3 5 - 5 0 g/dL Final    Total Bilirubin 04/02/2021 0 26  0 20 - 1 00 mg/dL Final    eGFR 04/02/2021 53  ml/min/1 73sq m Final    Hemoglobin A1C 04/02/2021 10 1* Normal 3 8-5 6%; PreDiabetic 5 7-6 4%;  Diabetic >=6 5%; Glycemic control for adults with diabetes <7 0% % Final    EAG 04/02/2021 243  mg/dl Final    Cholesterol 04/02/2021 200  50 - 200 mg/dL Final    Triglycerides 04/02/2021 182* <=150 mg/dL Final    HDL, Direct 04/02/2021 37* >=40 mg/dL Final    LDL Calculated 04/02/2021 127* 0 - 100 mg/dL Final    Non-HDL-Chol (CHOL-HDL) 04/02/2021 163  mg/dl Final           Current Medications     Current Outpatient Medications:     [employer] Lancets lancets, Test blood sugars 3 times daily, Disp: 100 each, Rfl: 1    Blood Glucose Monitoring Suppl (Contour Next EZ) w/Device KIT, Test blood sugars 3 times daily, Disp: 1 kit, Rfl: 0    Easy Touch Pen Needles 31G X 8 MM MISC, USE DAILY, Disp: 100 each, Rfl: 0    glucose blood test strip, Test blood sugars 3 times daily, Disp: 100 each, Rfl: 1    insulin glargine (Basaglar KwikPen) 100 units/mL injection pen, Inject 40 Units under the skin daily at bedtime, Disp: 5 pen, Rfl: 3    lisinopril-hydrochlorothiazide (PRINZIDE,ZESTORETIC) 20-12 5 MG per tablet, Take 1 tablet by mouth daily, Disp: 90 tablet, Rfl: 1    metFORMIN (GLUCOPHAGE) 500 mg tablet, Take 2 tablets (1,000 mg total) by mouth 2 (two) times a day, Disp: 180 tablet, Rfl: 3    nystatin (MYCOSTATIN) cream, Apply topically 2 (two) times a day, Disp: 30 g, Rfl: 0    omeprazole (PriLOSEC) 40 MG capsule, Take 1 capsule (40 mg total) by mouth daily before breakfast, Disp: 90 capsule, Rfl: 3    rosuvastatin (CRESTOR) 10 MG tablet, Take 1 tablet (10 mg total) by mouth daily, Disp: 90 tablet, Rfl: 3      Estefani Mcgrath MD

## 2021-06-07 DIAGNOSIS — Z79.4 TYPE 2 DIABETES MELLITUS WITH COMPLICATION, WITH LONG-TERM CURRENT USE OF INSULIN (HCC): ICD-10-CM

## 2021-06-07 DIAGNOSIS — E11.8 TYPE 2 DIABETES MELLITUS WITH COMPLICATION, WITH LONG-TERM CURRENT USE OF INSULIN (HCC): ICD-10-CM

## 2021-06-07 RX ORDER — PEN NEEDLE, DIABETIC 31 GX5/16"
NEEDLE, DISPOSABLE MISCELLANEOUS
Qty: 100 EACH | Refills: 0 | Status: SHIPPED | OUTPATIENT
Start: 2021-06-07 | End: 2021-12-13

## 2021-06-25 DIAGNOSIS — E11.9 TYPE 2 DIABETES MELLITUS WITHOUT COMPLICATION, WITHOUT LONG-TERM CURRENT USE OF INSULIN (HCC): ICD-10-CM

## 2021-06-25 DIAGNOSIS — I10 ESSENTIAL HYPERTENSION: ICD-10-CM

## 2021-06-25 RX ORDER — INSULIN GLARGINE 100 [IU]/ML
40 INJECTION, SOLUTION SUBCUTANEOUS
Qty: 15 ML | Refills: 3 | Status: SHIPPED | OUTPATIENT
Start: 2021-06-25 | End: 2021-10-21 | Stop reason: SDUPTHER

## 2021-06-25 RX ORDER — LISINOPRIL AND HYDROCHLOROTHIAZIDE 20; 12.5 MG/1; MG/1
1 TABLET ORAL DAILY
Qty: 90 TABLET | Refills: 1 | Status: SHIPPED | OUTPATIENT
Start: 2021-06-25 | End: 2021-10-21 | Stop reason: SDUPTHER

## 2021-07-14 ENCOUNTER — OFFICE VISIT (OUTPATIENT)
Dept: INTERNAL MEDICINE CLINIC | Facility: CLINIC | Age: 71
End: 2021-07-14
Payer: MEDICARE

## 2021-07-14 VITALS
RESPIRATION RATE: 12 BRPM | WEIGHT: 152.6 LBS | HEIGHT: 61 IN | SYSTOLIC BLOOD PRESSURE: 132 MMHG | HEART RATE: 79 BPM | TEMPERATURE: 97.1 F | DIASTOLIC BLOOD PRESSURE: 60 MMHG | BODY MASS INDEX: 28.81 KG/M2

## 2021-07-14 DIAGNOSIS — E78.00 HYPERCHOLESTEROLEMIA: ICD-10-CM

## 2021-07-14 DIAGNOSIS — I10 ESSENTIAL HYPERTENSION: ICD-10-CM

## 2021-07-14 DIAGNOSIS — E55.9 VITAMIN D DEFICIENCY: ICD-10-CM

## 2021-07-14 DIAGNOSIS — J20.9 ACUTE BRONCHITIS, UNSPECIFIED ORGANISM: Primary | ICD-10-CM

## 2021-07-14 DIAGNOSIS — E11.65 TYPE 2 DIABETES MELLITUS WITH HYPERGLYCEMIA, WITH LONG-TERM CURRENT USE OF INSULIN (HCC): ICD-10-CM

## 2021-07-14 DIAGNOSIS — Z79.4 TYPE 2 DIABETES MELLITUS WITH HYPERGLYCEMIA, WITH LONG-TERM CURRENT USE OF INSULIN (HCC): ICD-10-CM

## 2021-07-14 PROCEDURE — 99214 OFFICE O/P EST MOD 30 MIN: CPT | Performed by: INTERNAL MEDICINE

## 2021-07-14 PROCEDURE — 1123F ACP DISCUSS/DSCN MKR DOCD: CPT | Performed by: INTERNAL MEDICINE

## 2021-07-14 RX ORDER — AZITHROMYCIN 250 MG/1
TABLET, FILM COATED ORAL
Qty: 6 TABLET | Refills: 0 | Status: SHIPPED | OUTPATIENT
Start: 2021-07-14 | End: 2021-07-19

## 2021-07-14 RX ORDER — ALBUTEROL SULFATE 90 UG/1
2 AEROSOL, METERED RESPIRATORY (INHALATION) EVERY 6 HOURS PRN
Qty: 18 G | Refills: 0 | Status: SHIPPED | OUTPATIENT
Start: 2021-07-14

## 2021-07-14 NOTE — PROGRESS NOTES
[facility] [place]      NAME: Ana Cabrera  AGE: 80 y o  SEX: female  : 1950   MRN: [id number]    DATE: 2021  TIME: 9:46 AM    Assessment and Plan   1  Acute bronchitis, unspecified organism    The patient will be treated with azithromycin  She will be given albuterol for bronchospasm  I think this will help her cough  2  Essential hypertension    Adequately controlled  - CBC  - Comprehensive metabolic panel    3  Type 2 diabetes mellitus with hyperglycemia, with long-term current use of insulin (HCC)    The patient's diabetic control has not been good  She will be seeing Endocrinology in the near future  - Hemoglobin A1C    4  Hypercholesterolemia    Continue rosuvastatin  - Lipid panel    5  Vitamin D deficiency    On replacement  - Vitamin D 25 hydroxy      The patient will contact us promptly if her current bronchitis symptoms do not resolve  She will continue her usual medications for now  As mention, she will be seeing Endocrinology next month  She will return here for a wellness evaluation  Return to office in: Three months  Chief Complaint     Chief Complaint   Patient presents with    Follow-up     3 month       History of Present Illness       The patient returned to the office for re-evaluation of hypertension, hypercholesterolemia, type 2 diabetes, and vitamin-D deficiency  Since her last visit she has had a lot of stress because of her 's illness  Nevertheless, she was doing okay until about 5 days ago  Since that time she has been coughing and bringing up discolored sputum  She hears some wheezing at times  She has a lot of head congestion  She has no fever, body aches, etcetera  She has no chest pain or shortness of breath      The following portions of the patient's history were reviewed and updated as appropriate: allergies, current medications, past family history, past medical history, past social history, past surgical history and problem list     Review of Systems   Review of Systems   Constitutional: Negative  HENT: Positive for congestion, rhinorrhea and sore throat  Negative for ear pain, postnasal drip and trouble swallowing  Eyes: Negative for pain, discharge, redness and visual disturbance  Respiratory: Positive for cough and wheezing  Negative for shortness of breath  Cardiovascular: Negative  Gastrointestinal: Negative  Endocrine: Negative  Genitourinary: Negative for difficulty urinating, dysuria, frequency, hematuria and urgency  Musculoskeletal: Negative for arthralgias, gait problem, joint swelling and myalgias  Skin: Negative for rash  Neurological: Negative for dizziness, speech difficulty, weakness, light-headedness, numbness and headaches  Hematological: Negative  Psychiatric/Behavioral: Negative for confusion, decreased concentration, dysphoric mood and sleep disturbance  The patient is not nervous/anxious  Active Problem List     Patient Active Problem List   Diagnosis    Allergic rhinitis    Hypertension    Gastroesophageal reflux disease with esophagitis    Hypercholesterolemia    Type 2 diabetes mellitus with hyperglycemia (HCC)    Vitamin D deficiency    Wrist joint pain    Sciatica of right side       Objective   /60 (BP Location: Left arm, Patient Position: Sitting, Cuff Size: Standard)   Pulse 79   Temp (!) 97 1 °F (36 2 °C)   Resp 12   Ht 5' 1" (1 549 m)   Wt 69 2 kg (152 lb 9 6 oz)   BMI 28 83 kg/m²     Physical Exam  Constitutional:       General: She is not in acute distress  Appearance: She is well-developed  HENT:      Head: Normocephalic and atraumatic  Right Ear: Tympanic membrane, ear canal and external ear normal       Left Ear: Tympanic membrane, ear canal and external ear normal       Mouth/Throat:      Mouth: Mucous membranes are moist       Pharynx: Oropharynx is clear  Neck:      Thyroid: No thyromegaly        Vascular: No JVD  Trachea: No tracheal deviation  Cardiovascular:      Rate and Rhythm: Normal rate and regular rhythm  Heart sounds: Normal heart sounds  No murmur heard  No friction rub  No gallop  Pulmonary:      Effort: Pulmonary effort is normal       Breath sounds: Wheezing and rhonchi present  No rales  Chest:      Chest wall: No tenderness  Abdominal:      General: Bowel sounds are normal  There is no distension  Palpations: Abdomen is soft  There is no mass  Tenderness: There is no abdominal tenderness  There is no rebound  Musculoskeletal:         General: No tenderness  Normal range of motion  Cervical back: Neck supple  Lymphadenopathy:      Cervical: No cervical adenopathy  Skin:     General: Skin is warm and dry  Neurological:      Mental Status: She is alert and oriented to person, place, and time  Pertinent Laboratory/Diagnostic Studies:  No visits with results within 3 Month(s) from this visit  Latest known visit with results is:   Office Visit on 11/18/2020   Component Date Value Ref Range Status    Sodium 04/02/2021 139  136 - 145 mmol/L Final    Potassium 04/02/2021 4 2  3 5 - 5 3 mmol/L Final    Chloride 04/02/2021 106  100 - 108 mmol/L Final    CO2 04/02/2021 28  21 - 32 mmol/L Final    ANION GAP 04/02/2021 5  4 - 13 mmol/L Final    BUN 04/02/2021 19  5 - 25 mg/dL Final    Creatinine 04/02/2021 1 07  0 60 - 1 30 mg/dL Final    Glucose, Fasting 04/02/2021 199* 65 - 99 mg/dL Final    Calcium 04/02/2021 9 3  8 3 - 10 1 mg/dL Final    AST 04/02/2021 12  5 - 45 U/L Final    ALT 04/02/2021 26  12 - 78 U/L Final    Alkaline Phosphatase 04/02/2021 59  46 - 116 U/L Final    Total Protein 04/02/2021 7 1  6 4 - 8 2 g/dL Final    Albumin 04/02/2021 3 7  3 5 - 5 0 g/dL Final    Total Bilirubin 04/02/2021 0 26  0 20 - 1 00 mg/dL Final    eGFR 04/02/2021 53  ml/min/1 73sq m Final    Hemoglobin A1C 04/02/2021 10 1* Normal 3 8-5 6%;  PreDiabetic 5 7-6 4%; Diabetic >=6 5%; Glycemic control for adults with diabetes <7 0% % Final    EAG 04/02/2021 243  mg/dl Final    Cholesterol 04/02/2021 200  50 - 200 mg/dL Final    Triglycerides 04/02/2021 182* <=150 mg/dL Final    HDL, Direct 04/02/2021 37* >=40 mg/dL Final    LDL Calculated 04/02/2021 127* 0 - 100 mg/dL Final    Non-HDL-Chol (CHOL-HDL) 04/02/2021 163  mg/dl Final           Current Medications     Current Outpatient Medications:     [employer] Lancets lancets, Test blood sugars 3 times daily, Disp: 100 each, Rfl: 1    Blood Glucose Monitoring Suppl (Contour Next EZ) w/Device KIT, Test blood sugars 3 times daily, Disp: 1 kit, Rfl: 0    Easy Touch Pen Needles 31G X 8 MM MISC, USE DAILY, Disp: 100 each, Rfl: 0    glucose blood test strip, Test blood sugars 3 times daily, Disp: 100 each, Rfl: 1    insulin glargine (Basaglar KwikPen) 100 units/mL injection pen, Inject 40 Units under the skin daily at bedtime, Disp: 15 mL, Rfl: 3    lisinopril-hydrochlorothiazide (PRINZIDE,ZESTORETIC) 20-12 5 MG per tablet, Take 1 tablet by mouth daily, Disp: 90 tablet, Rfl: 1    metFORMIN (GLUCOPHAGE) 500 mg tablet, Take 2 tablets (1,000 mg total) by mouth 2 (two) times a day, Disp: 180 tablet, Rfl: 3    omeprazole (PriLOSEC) 40 MG capsule, Take 1 capsule (40 mg total) by mouth daily before breakfast, Disp: 90 capsule, Rfl: 3    rosuvastatin (CRESTOR) 10 MG tablet, Take 1 tablet (10 mg total) by mouth daily, Disp: 90 tablet, Rfl: 3    albuterol (ProAir HFA) 90 mcg/act inhaler, Inhale 2 puffs every 6 (six) hours as needed for wheezing, Disp: 18 g, Rfl: 0    azithromycin (Zithromax) 250 mg tablet, Take 2 tablets (500 mg total) by mouth daily for 1 day, THEN 1 tablet (250 mg total) daily for 4 days  , Disp: 6 tablet, Rfl: 0    nystatin (MYCOSTATIN) cream, Apply topically 2 (two) times a day, Disp: 30 g, Rfl: 0      Christian Angel MD

## 2021-07-30 DIAGNOSIS — E11.9 TYPE 2 DIABETES MELLITUS WITHOUT COMPLICATION, WITHOUT LONG-TERM CURRENT USE OF INSULIN (HCC): ICD-10-CM

## 2021-09-09 DIAGNOSIS — R41.82 ALTERED MENTAL STATUS, UNSPECIFIED ALTERED MENTAL STATUS TYPE: Primary | ICD-10-CM

## 2021-09-09 NOTE — PROGRESS NOTES
PC from pts wife Navarro Patel, c/o pt repeating thing, and acting differently  She suspects he may have had another mini stroke        As per Dr Lindell Pallas ok to order CT of the head  wo contrast

## 2021-10-02 ENCOUNTER — HOSPITAL ENCOUNTER (OUTPATIENT)
Dept: MAMMOGRAPHY | Facility: CLINIC | Age: 71
Discharge: HOME | End: 2021-10-02
Payer: MEDICARE

## 2021-10-02 VITALS — BODY MASS INDEX: 28.32 KG/M2 | WEIGHT: 150 LBS | HEIGHT: 61 IN

## 2021-10-02 DIAGNOSIS — Z12.31 ENCOUNTER FOR SCREENING MAMMOGRAM FOR MALIGNANT NEOPLASM OF BREAST: ICD-10-CM

## 2021-10-02 PROCEDURE — 77067 SCR MAMMO BI INCL CAD: CPT

## 2021-10-02 PROCEDURE — 77063 BREAST TOMOSYNTHESIS BI: CPT

## 2021-10-20 ENCOUNTER — APPOINTMENT (OUTPATIENT)
Dept: LAB | Facility: MEDICAL CENTER | Age: 71
End: 2021-10-20
Payer: MEDICARE

## 2021-10-20 LAB
25(OH)D3 SERPL-MCNC: 24.4 NG/ML (ref 30–100)
ALBUMIN SERPL BCP-MCNC: 3.8 G/DL (ref 3.5–5)
ALP SERPL-CCNC: 61 U/L (ref 46–116)
ALT SERPL W P-5'-P-CCNC: 26 U/L (ref 12–78)
ANION GAP SERPL CALCULATED.3IONS-SCNC: 4 MMOL/L (ref 4–13)
AST SERPL W P-5'-P-CCNC: 14 U/L (ref 5–45)
BILIRUB SERPL-MCNC: 0.48 MG/DL (ref 0.2–1)
BUN SERPL-MCNC: 18 MG/DL (ref 5–25)
CALCIUM SERPL-MCNC: 9.7 MG/DL (ref 8.3–10.1)
CHLORIDE SERPL-SCNC: 104 MMOL/L (ref 100–108)
CHOLEST SERPL-MCNC: 273 MG/DL (ref 50–200)
CO2 SERPL-SCNC: 28 MMOL/L (ref 21–32)
CREAT SERPL-MCNC: 0.9 MG/DL (ref 0.6–1.3)
ERYTHROCYTE [DISTWIDTH] IN BLOOD BY AUTOMATED COUNT: 12.6 % (ref 11.6–15.1)
EST. AVERAGE GLUCOSE BLD GHB EST-MCNC: 266 MG/DL
GFR SERPL CREATININE-BSD FRML MDRD: 65 ML/MIN/1.73SQ M
GLUCOSE P FAST SERPL-MCNC: 181 MG/DL (ref 65–99)
HBA1C MFR BLD: 10.9 %
HCT VFR BLD AUTO: 38.2 % (ref 34.8–46.1)
HDLC SERPL-MCNC: 39 MG/DL
HGB BLD-MCNC: 12.6 G/DL (ref 11.5–15.4)
LDLC SERPL CALC-MCNC: 176 MG/DL (ref 0–100)
MCH RBC QN AUTO: 29.4 PG (ref 26.8–34.3)
MCHC RBC AUTO-ENTMCNC: 33 G/DL (ref 31.4–37.4)
MCV RBC AUTO: 89 FL (ref 82–98)
NONHDLC SERPL-MCNC: 234 MG/DL
PLATELET # BLD AUTO: 230 THOUSANDS/UL (ref 149–390)
PMV BLD AUTO: 12.4 FL (ref 8.9–12.7)
POTASSIUM SERPL-SCNC: 4.1 MMOL/L (ref 3.5–5.3)
PROT SERPL-MCNC: 7.1 G/DL (ref 6.4–8.2)
RBC # BLD AUTO: 4.29 MILLION/UL (ref 3.81–5.12)
SODIUM SERPL-SCNC: 136 MMOL/L (ref 136–145)
TRIGL SERPL-MCNC: 291 MG/DL
WBC # BLD AUTO: 8.02 THOUSAND/UL (ref 4.31–10.16)

## 2021-10-20 PROCEDURE — 85027 COMPLETE CBC AUTOMATED: CPT | Performed by: INTERNAL MEDICINE

## 2021-10-20 PROCEDURE — 36415 COLL VENOUS BLD VENIPUNCTURE: CPT | Performed by: INTERNAL MEDICINE

## 2021-10-20 PROCEDURE — 83036 HEMOGLOBIN GLYCOSYLATED A1C: CPT | Performed by: INTERNAL MEDICINE

## 2021-10-20 PROCEDURE — 82306 VITAMIN D 25 HYDROXY: CPT | Performed by: INTERNAL MEDICINE

## 2021-10-20 PROCEDURE — 80061 LIPID PANEL: CPT | Performed by: INTERNAL MEDICINE

## 2021-10-20 PROCEDURE — 80053 COMPREHEN METABOLIC PANEL: CPT | Performed by: INTERNAL MEDICINE

## 2021-10-21 ENCOUNTER — OFFICE VISIT (OUTPATIENT)
Dept: INTERNAL MEDICINE CLINIC | Facility: CLINIC | Age: 71
End: 2021-10-21
Payer: MEDICARE

## 2021-10-21 VITALS
HEIGHT: 61 IN | HEART RATE: 86 BPM | DIASTOLIC BLOOD PRESSURE: 60 MMHG | WEIGHT: 155 LBS | RESPIRATION RATE: 16 BRPM | SYSTOLIC BLOOD PRESSURE: 110 MMHG | TEMPERATURE: 97.8 F | BODY MASS INDEX: 29.27 KG/M2

## 2021-10-21 DIAGNOSIS — Z23 ENCOUNTER FOR IMMUNIZATION: Primary | ICD-10-CM

## 2021-10-21 DIAGNOSIS — E78.00 HYPERCHOLESTEROLEMIA: ICD-10-CM

## 2021-10-21 DIAGNOSIS — I10 ESSENTIAL HYPERTENSION: ICD-10-CM

## 2021-10-21 DIAGNOSIS — K21.00 GASTROESOPHAGEAL REFLUX DISEASE WITH ESOPHAGITIS: ICD-10-CM

## 2021-10-21 DIAGNOSIS — E55.9 VITAMIN D DEFICIENCY: ICD-10-CM

## 2021-10-21 DIAGNOSIS — E11.65 TYPE 2 DIABETES MELLITUS WITH HYPERGLYCEMIA, WITH LONG-TERM CURRENT USE OF INSULIN (HCC): ICD-10-CM

## 2021-10-21 DIAGNOSIS — E11.9 TYPE 2 DIABETES MELLITUS WITHOUT COMPLICATION, WITHOUT LONG-TERM CURRENT USE OF INSULIN (HCC): ICD-10-CM

## 2021-10-21 DIAGNOSIS — Z79.4 TYPE 2 DIABETES MELLITUS WITH HYPERGLYCEMIA, WITH LONG-TERM CURRENT USE OF INSULIN (HCC): ICD-10-CM

## 2021-10-21 DIAGNOSIS — I10 PRIMARY HYPERTENSION: ICD-10-CM

## 2021-10-21 PROCEDURE — 99214 OFFICE O/P EST MOD 30 MIN: CPT | Performed by: INTERNAL MEDICINE

## 2021-10-21 PROCEDURE — 90662 IIV NO PRSV INCREASED AG IM: CPT | Performed by: INTERNAL MEDICINE

## 2021-10-21 PROCEDURE — G0008 ADMIN INFLUENZA VIRUS VAC: HCPCS | Performed by: INTERNAL MEDICINE

## 2021-10-21 RX ORDER — OMEPRAZOLE 40 MG/1
40 CAPSULE, DELAYED RELEASE ORAL
Qty: 90 CAPSULE | Refills: 3 | Status: SHIPPED | OUTPATIENT
Start: 2021-10-21 | End: 2022-01-26 | Stop reason: SDUPTHER

## 2021-10-21 RX ORDER — LISINOPRIL AND HYDROCHLOROTHIAZIDE 20; 12.5 MG/1; MG/1
1 TABLET ORAL DAILY
Qty: 90 TABLET | Refills: 1 | Status: SHIPPED | OUTPATIENT
Start: 2021-10-21 | End: 2022-01-26 | Stop reason: SDUPTHER

## 2021-10-21 RX ORDER — INSULIN GLARGINE 100 [IU]/ML
50 INJECTION, SOLUTION SUBCUTANEOUS
Qty: 15 ML | Refills: 3 | Status: SHIPPED | OUTPATIENT
Start: 2021-10-21 | End: 2022-01-26 | Stop reason: SDUPTHER

## 2021-10-21 RX ORDER — ROSUVASTATIN CALCIUM 10 MG/1
10 TABLET, COATED ORAL DAILY
Qty: 90 TABLET | Refills: 3 | OUTPATIENT
Start: 2021-10-21 | End: 2022-01-26 | Stop reason: SDUPTHER

## 2021-10-21 RX ORDER — LANCETS
EACH MISCELLANEOUS
Qty: 100 EACH | Refills: 2 | Status: SHIPPED | OUTPATIENT
Start: 2021-10-21

## 2021-12-13 DIAGNOSIS — E11.8 TYPE 2 DIABETES MELLITUS WITH COMPLICATION, WITH LONG-TERM CURRENT USE OF INSULIN (HCC): ICD-10-CM

## 2021-12-13 DIAGNOSIS — Z79.4 TYPE 2 DIABETES MELLITUS WITH COMPLICATION, WITH LONG-TERM CURRENT USE OF INSULIN (HCC): ICD-10-CM

## 2021-12-13 RX ORDER — PEN NEEDLE, DIABETIC 31 GX5/16"
NEEDLE, DISPOSABLE MISCELLANEOUS
Qty: 100 EACH | Refills: 0 | Status: SHIPPED | OUTPATIENT
Start: 2021-12-13 | End: 2022-03-08 | Stop reason: SDUPTHER

## 2022-01-07 ENCOUNTER — TELEMEDICINE (OUTPATIENT)
Dept: INTERNAL MEDICINE CLINIC | Facility: CLINIC | Age: 72
End: 2022-01-07
Payer: MEDICARE

## 2022-01-07 DIAGNOSIS — U07.1 COVID-19: Primary | ICD-10-CM

## 2022-01-07 PROCEDURE — 99442 PR PHYS/QHP TELEPHONE EVALUATION 11-20 MIN: CPT | Performed by: INTERNAL MEDICINE

## 2022-01-07 NOTE — PROGRESS NOTES
Virtual Brief Visit    Patient is located in the following state in which I hold an active license [place]      Assessment/Plan:    She seems to be improving  She received 2 doses of the Moderna vaccine  Discussed quarantine guidelines  Her comorbidities are as noted below  Advised her to let me know if symptoms worsen      Problem List Items Addressed This Visit     None      Visit Diagnoses     COVID-19    -  Primary        Latisha's evaluated today via telephone visit for probable COVID diagnosis  She has a medical history of insulin-dependent type 2 diabetes, mild gastroparesis, GERD, essential hypertension    Latisha's  developed cough and congestion several days before [place]  She developed similar symptoms several days later - cough, insomnia, intermittent fevers  She initially had a negative home test for COVID but retested herself today and her test was positive  Overall, she seems to be feeling better        Recent Visits  No visits were found meeting these conditions  Showing recent visits within past 7 days and meeting all other requirements  Today's Visits  Date Type Provider Dept   01/07/22 Telemedicine Salvador Joseph DO [facility] [place]   Showing today's visits and meeting all other requirements  Future Appointments  No visits were found meeting these conditions    Showing future appointments within next 150 days and meeting all other requirements         I spent 20 minutes directly with the patient during this visit

## 2022-01-17 ENCOUNTER — VBI (OUTPATIENT)
Dept: ADMINISTRATIVE | Facility: OTHER | Age: 72
End: 2022-01-17

## 2022-01-18 ENCOUNTER — VBI (OUTPATIENT)
Dept: ADMINISTRATIVE | Facility: OTHER | Age: 72
End: 2022-01-18

## 2022-01-24 ENCOUNTER — APPOINTMENT (OUTPATIENT)
Dept: LAB | Facility: MEDICAL CENTER | Age: 72
End: 2022-01-24
Payer: MEDICARE

## 2022-01-24 DIAGNOSIS — E78.00 HYPERCHOLESTEROLEMIA: ICD-10-CM

## 2022-01-24 DIAGNOSIS — Z79.4 TYPE 2 DIABETES MELLITUS WITH HYPERGLYCEMIA, WITH LONG-TERM CURRENT USE OF INSULIN (HCC): ICD-10-CM

## 2022-01-24 DIAGNOSIS — E11.65 TYPE 2 DIABETES MELLITUS WITH HYPERGLYCEMIA, WITH LONG-TERM CURRENT USE OF INSULIN (HCC): ICD-10-CM

## 2022-01-24 LAB
ALBUMIN SERPL BCP-MCNC: 3.8 G/DL (ref 3.5–5)
ALP SERPL-CCNC: 60 U/L (ref 46–116)
ALT SERPL W P-5'-P-CCNC: 20 U/L (ref 12–78)
ANION GAP SERPL CALCULATED.3IONS-SCNC: 6 MMOL/L (ref 4–13)
AST SERPL W P-5'-P-CCNC: 15 U/L (ref 5–45)
BILIRUB SERPL-MCNC: 1.28 MG/DL (ref 0.2–1)
BUN SERPL-MCNC: 21 MG/DL (ref 5–25)
CALCIUM SERPL-MCNC: 9.5 MG/DL (ref 8.3–10.1)
CHLORIDE SERPL-SCNC: 105 MMOL/L (ref 100–108)
CHOLEST SERPL-MCNC: 237 MG/DL
CO2 SERPL-SCNC: 28 MMOL/L (ref 21–32)
CREAT SERPL-MCNC: 0.86 MG/DL (ref 0.6–1.3)
EST. AVERAGE GLUCOSE BLD GHB EST-MCNC: 220 MG/DL
GFR SERPL CREATININE-BSD FRML MDRD: 68 ML/MIN/1.73SQ M
GLUCOSE P FAST SERPL-MCNC: 181 MG/DL (ref 65–99)
HBA1C MFR BLD: 9.3 %
HDLC SERPL-MCNC: 37 MG/DL
LDLC SERPL CALC-MCNC: 157 MG/DL (ref 0–100)
NONHDLC SERPL-MCNC: 200 MG/DL
POTASSIUM SERPL-SCNC: 4 MMOL/L (ref 3.5–5.3)
PROT SERPL-MCNC: 7.4 G/DL (ref 6.4–8.2)
SODIUM SERPL-SCNC: 139 MMOL/L (ref 136–145)
TRIGL SERPL-MCNC: 217 MG/DL

## 2022-01-24 PROCEDURE — 83036 HEMOGLOBIN GLYCOSYLATED A1C: CPT

## 2022-01-24 PROCEDURE — 80061 LIPID PANEL: CPT

## 2022-01-24 PROCEDURE — 80053 COMPREHEN METABOLIC PANEL: CPT

## 2022-01-24 PROCEDURE — 36415 COLL VENOUS BLD VENIPUNCTURE: CPT

## 2022-01-26 ENCOUNTER — OFFICE VISIT (OUTPATIENT)
Dept: INTERNAL MEDICINE CLINIC | Facility: CLINIC | Age: 72
End: 2022-01-26
Payer: MEDICARE

## 2022-01-26 VITALS
BODY MASS INDEX: 29.07 KG/M2 | HEART RATE: 84 BPM | WEIGHT: 154 LBS | HEIGHT: 61 IN | OXYGEN SATURATION: 97 % | DIASTOLIC BLOOD PRESSURE: 80 MMHG | SYSTOLIC BLOOD PRESSURE: 124 MMHG | TEMPERATURE: 98 F

## 2022-01-26 DIAGNOSIS — E78.00 HYPERCHOLESTEROLEMIA: ICD-10-CM

## 2022-01-26 DIAGNOSIS — E55.9 VITAMIN D DEFICIENCY: ICD-10-CM

## 2022-01-26 DIAGNOSIS — E11.65 TYPE 2 DIABETES MELLITUS WITH HYPERGLYCEMIA, WITH LONG-TERM CURRENT USE OF INSULIN (HCC): ICD-10-CM

## 2022-01-26 DIAGNOSIS — I10 PRIMARY HYPERTENSION: Primary | ICD-10-CM

## 2022-01-26 DIAGNOSIS — I10 ESSENTIAL HYPERTENSION: ICD-10-CM

## 2022-01-26 DIAGNOSIS — Z79.4 TYPE 2 DIABETES MELLITUS WITHOUT COMPLICATION, WITH LONG-TERM CURRENT USE OF INSULIN (HCC): ICD-10-CM

## 2022-01-26 DIAGNOSIS — Z79.4 TYPE 2 DIABETES MELLITUS WITH HYPERGLYCEMIA, WITH LONG-TERM CURRENT USE OF INSULIN (HCC): ICD-10-CM

## 2022-01-26 DIAGNOSIS — K21.00 GASTROESOPHAGEAL REFLUX DISEASE WITH ESOPHAGITIS: ICD-10-CM

## 2022-01-26 DIAGNOSIS — E11.9 TYPE 2 DIABETES MELLITUS WITHOUT COMPLICATION, WITH LONG-TERM CURRENT USE OF INSULIN (HCC): ICD-10-CM

## 2022-01-26 PROCEDURE — 99214 OFFICE O/P EST MOD 30 MIN: CPT | Performed by: INTERNAL MEDICINE

## 2022-01-26 RX ORDER — ROSUVASTATIN CALCIUM 10 MG/1
10 TABLET, COATED ORAL DAILY
Qty: 90 TABLET | Refills: 3 | Status: SHIPPED | OUTPATIENT
Start: 2022-01-26

## 2022-01-26 RX ORDER — OMEPRAZOLE 40 MG/1
40 CAPSULE, DELAYED RELEASE ORAL
Qty: 90 CAPSULE | Refills: 3 | Status: SHIPPED | OUTPATIENT
Start: 2022-01-26

## 2022-01-26 RX ORDER — LISINOPRIL AND HYDROCHLOROTHIAZIDE 20; 12.5 MG/1; MG/1
1 TABLET ORAL DAILY
Qty: 90 TABLET | Refills: 1 | Status: SHIPPED | OUTPATIENT
Start: 2022-01-26

## 2022-01-26 RX ORDER — INSULIN GLARGINE 100 [IU]/ML
50 INJECTION, SOLUTION SUBCUTANEOUS
Qty: 15 ML | Refills: 3 | Status: SHIPPED | OUTPATIENT
Start: 2022-01-26

## 2022-01-26 NOTE — PROGRESS NOTES
[facility]      NAME: Mira Dalal  AGE: 80 y o  SEX: female  : 1950   MRN: [id number]    DATE: 2022  TIME: 2:07 PM    Assessment and Plan   1  Essential hypertension  Well controlled  - lisinopril-hydrochlorothiazide (PRINZIDE,ZESTORETIC) 20-12 5 MG per tablet; Take 1 tablet by mouth daily  Dispense: 90 tablet; Refill: 1    2  Gastroesophageal reflux disease with esophagitis    Symptoms are stable on omeprazole  - omeprazole (PriLOSEC) 40 MG capsule; Take 1 capsule (40 mg total) by mouth daily before breakfast  Dispense: 90 capsule; Refill: 3    3  Hypercholesterolemia    Controlled on rosuvastatin  - rosuvastatin (CRESTOR) 10 MG tablet; Take 1 tablet (10 mg total) by mouth daily  Dispense: 90 tablet; Refill: 3  - Lipid panel; Future    4  Type 2 diabetes mellitus without complication, with long-term current use of insulin (HCC)    The patient's hemoglobin has improved though it is not yet optimal   She will continue her current medications  - insulin glargine (Basaglar KwikPen) 100 units/mL injection pen; Inject 50 Units under the skin daily at bedtime  Dispense: 15 mL; Refill: 3  - metFORMIN (GLUCOPHAGE) 500 mg tablet; Take 2 tablets (1,000 mg total) by mouth 2 (two) times a day  Dispense: 180 tablet; Refill: 3    5  Vitamin D deficiency    On replacement  The patient is doing generally well  Her diabetic control is improving  She is tolerating her medications well  She is watching her diet a bit more carefully  Repeat lab work will be obtained before her return visit  Return to office in: Three months  Chief Complaint     Chief Complaint   Patient presents with    Follow-up     3 months       History of Present Illness       The patient returned to the office for re-evaluation of hypertension, hypercholesterolemia, type 2 diabetes, esophageal reflux, and vitamin-D deficiency  Since her last visit she has been feeling pretty well    She has had no chest pain, shortness of breath, palpitations, or dizziness  She has had no symptoms of hyperglycemia or hypoglycemia  She has been watching her diet more carefully  She is tolerating her medications well  The following portions of the patient's history were reviewed and updated as appropriate: allergies, current medications, past family history, past medical history, past social history, past surgical history and problem list     Review of Systems   Review of Systems   Constitutional: Negative  HENT: Negative for congestion, ear pain, postnasal drip, rhinorrhea, sore throat and trouble swallowing  Eyes: Negative for pain, discharge, redness and visual disturbance  Respiratory: Negative for cough, shortness of breath and wheezing  Cardiovascular: Negative  Gastrointestinal: Negative  Endocrine: Negative  Genitourinary: Negative for difficulty urinating, dysuria, frequency, hematuria and urgency  Musculoskeletal: Negative for arthralgias, gait problem, joint swelling and myalgias  Skin: Negative for rash  Neurological: Negative for dizziness, speech difficulty, weakness, light-headedness, numbness and headaches  Hematological: Negative  Psychiatric/Behavioral: Negative for confusion, decreased concentration, dysphoric mood and sleep disturbance  The patient is not nervous/anxious  Active Problem List     Patient Active Problem List   Diagnosis    Allergic rhinitis    Hypertension    Gastroesophageal reflux disease with esophagitis    Hypercholesterolemia    Type 2 diabetes mellitus with hyperglycemia (HCC)    Vitamin D deficiency    Wrist joint pain    Sciatica of right side       Objective   /80 (BP Location: Left arm, Patient Position: Sitting, Cuff Size: Adult)   Pulse 84   Temp 98 °F (36 7 °C)   Ht 5' 1" (1 549 m)   Wt 69 9 kg (154 lb)   SpO2 97%   BMI 29 10 kg/m²     Physical Exam  Constitutional:       General: She is not in acute distress  Appearance: She is well-developed  HENT:      Head: Normocephalic and atraumatic  Neck:      Thyroid: No thyromegaly  Vascular: No JVD  Trachea: No tracheal deviation  Cardiovascular:      Rate and Rhythm: Normal rate and regular rhythm  Heart sounds: Normal heart sounds  No murmur heard  No friction rub  No gallop  Pulmonary:      Effort: Pulmonary effort is normal       Breath sounds: Normal breath sounds  No wheezing or rales  Chest:      Chest wall: No tenderness  Abdominal:      General: Bowel sounds are normal  There is no distension  Palpations: Abdomen is soft  There is no mass  Tenderness: There is no abdominal tenderness  There is no rebound  Musculoskeletal:         General: No tenderness  Normal range of motion  Cervical back: Neck supple  Lymphadenopathy:      Cervical: No cervical adenopathy  Skin:     General: Skin is warm and dry  Neurological:      Mental Status: She is alert and oriented to person, place, and time  Psychiatric:         Mood and Affect: Mood normal          Behavior: Behavior normal          Thought Content:  Thought content normal          Judgment: Judgment normal          Pertinent Laboratory/Diagnostic Studies:  Appointment on 01/24/2022   Component Date Value Ref Range Status    Sodium 01/24/2022 139  136 - 145 mmol/L Final    Potassium 01/24/2022 4 0  3 5 - 5 3 mmol/L Final    Chloride 01/24/2022 105  100 - 108 mmol/L Final    CO2 01/24/2022 28  21 - 32 mmol/L Final    ANION GAP 01/24/2022 6  4 - 13 mmol/L Final    BUN 01/24/2022 21  5 - 25 mg/dL Final    Creatinine 01/24/2022 0 86  0 60 - 1 30 mg/dL Final    Glucose, Fasting 01/24/2022 181* 65 - 99 mg/dL Final    Calcium 01/24/2022 9 5  8 3 - 10 1 mg/dL Final    AST 01/24/2022 15  5 - 45 U/L Final    ALT 01/24/2022 20  12 - 78 U/L Final    Alkaline Phosphatase 01/24/2022 60  46 - 116 U/L Final    Total Protein 01/24/2022 7 4  6 4 - 8 2 g/dL Final    Albumin 01/24/2022 3 8  3 5 - 5 0 g/dL Final    Total Bilirubin 01/24/2022 1 28* 0 20 - 1 00 mg/dL Final    eGFR 01/24/2022 68  ml/min/1 73sq m Final    Hemoglobin A1C 01/24/2022 9 3* Normal 3 8-5 6%; PreDiabetic 5 7-6 4%;  Diabetic >=6 5%; Glycemic control for adults with diabetes <7 0% % Final    EAG 01/24/2022 220  mg/dl Final    Cholesterol 01/24/2022 237* See Comment mg/dL Final    Triglycerides 01/24/2022 217* See Comment mg/dL Final    HDL, Direct 01/24/2022 37* >=50 mg/dL Final    LDL Calculated 01/24/2022 157* 0 - 100 mg/dL Final    Non-HDL-Chol (CHOL-HDL) 01/24/2022 200  mg/dl Final           Current Medications     Current Outpatient Medications:     Blood Glucose Monitoring Suppl (Contour Next EZ) w/Device KIT, Test blood sugars 3 times daily, Disp: 1 kit, Rfl: 0    Easy Touch Pen Needles 31G X 8 MM MISC, USE DAILY, Disp: 100 each, Rfl: 0    glucose blood test strip, Test blood sugars 3 times daily, Disp: 100 each, Rfl: 1    Lancets (onetouch ultrasoft) lancets, Use as instructed, Disp: 100 each, Rfl: 2    metFORMIN (GLUCOPHAGE) 500 mg tablet, Take 2 tablets (1,000 mg total) by mouth 2 (two) times a day, Disp: 180 tablet, Rfl: 3    albuterol (ProAir HFA) 90 mcg/act inhaler, Inhale 2 puffs every 6 (six) hours as needed for wheezing (Patient not taking: Reported on 1/26/2022 ), Disp: 18 g, Rfl: 0    insulin glargine (Basaglar KwikPen) 100 units/mL injection pen, Inject 50 Units under the skin daily at bedtime, Disp: 15 mL, Rfl: 3    lisinopril-hydrochlorothiazide (PRINZIDE,ZESTORETIC) 20-12 5 MG per tablet, Take 1 tablet by mouth daily, Disp: 90 tablet, Rfl: 1    nystatin (MYCOSTATIN) cream, Apply topically 2 (two) times a day (Patient not taking: Reported on 1/26/2022 ), Disp: 30 g, Rfl: 0    omeprazole (PriLOSEC) 40 MG capsule, Take 1 capsule (40 mg total) by mouth daily before breakfast, Disp: 90 capsule, Rfl: 3    rosuvastatin (CRESTOR) 10 MG tablet, Take 1 tablet (10 mg total) by mouth daily, Disp: 90 tablet, Rfl: 3      Silvio Gonzales MD

## 2022-02-02 ENCOUNTER — VBI (OUTPATIENT)
Dept: ADMINISTRATIVE | Facility: OTHER | Age: 72
End: 2022-02-02

## 2022-02-04 ENCOUNTER — VBI (OUTPATIENT)
Dept: ADMINISTRATIVE | Facility: OTHER | Age: 72
End: 2022-02-04

## 2022-02-04 NOTE — TELEPHONE ENCOUNTER
02/04/22 11:33 AM     See documentation in the [employer] SmartForm       [facility]

## 2022-03-08 DIAGNOSIS — Z79.4 TYPE 2 DIABETES MELLITUS WITH COMPLICATION, WITH LONG-TERM CURRENT USE OF INSULIN (HCC): ICD-10-CM

## 2022-03-08 DIAGNOSIS — E11.8 TYPE 2 DIABETES MELLITUS WITH COMPLICATION, WITH LONG-TERM CURRENT USE OF INSULIN (HCC): ICD-10-CM

## 2022-03-08 RX ORDER — PEN NEEDLE, DIABETIC 31 GX5/16"
NEEDLE, DISPOSABLE MISCELLANEOUS
Qty: 100 EACH | Refills: 0 | Status: SHIPPED | OUTPATIENT
Start: 2022-03-08

## 2022-03-18 ENCOUNTER — TELEPHONE (OUTPATIENT)
Dept: INTERNAL MEDICINE CLINIC | Facility: CLINIC | Age: 72
End: 2022-03-18

## 2022-03-18 NOTE — TELEPHONE ENCOUNTER
Patient's  called to let us know she is scheduled with a new Dr in [place] and will not be coming back to our office

## 2022-03-21 ENCOUNTER — TELEPHONE (OUTPATIENT)
Dept: INTERNAL MEDICINE CLINIC | Facility: CLINIC | Age: 72
End: 2022-03-21

## 2022-03-21 NOTE — TELEPHONE ENCOUNTER
Please remove Dr Taye Pham from PCP    Patient has moved and will be following with another PCP (3/18/22)

## 2022-06-08 NOTE — TELEPHONE ENCOUNTER
06/08/22 3:52 PM     Thank you for your request  Your request has been received, reviewed, and the patient chart updated  The PCP has successfully been removed with a patient attribution note  This message will now be completed      Thank you  Contreras Caputo

## 2023-01-23 NOTE — PROGRESS NOTES
Assessment/Plan:    Bronchitis  Came in urgently because of a chronic cough she has phlegm but she swallows it she went to urgent care the did a strep screen which was negative cough persist and that she came in for further evaluation therein ischial diagnosis was viral now she is having loose stools for about a couple of days but she has been coughing for about 3 weeks no hemoptysis no night sweats  Came in for further evaluation  Her lungs some some rhonchi at the bases but no wheezing no rales the  Heart regular rhythm  She has no fever  Will do the following  Give her a Z-Karl  Plenty of fluid and Tylenol  If the cough persists she is on an ACE-inhibitor and specially the chest x-ray is negative which will order today with some laboratory studies  Type 2 diabetes mellitus with hyperglycemia (HCC)  Lab Results   Component Value Date    HGBA1C 7 4 (H) 08/27/2018       No results for input(s): POCGLU in the last 72 hours  Well controlled for age and comorbidities    Blood Sugar Average: Last 72 hrs good control for her age and comorbidities no change in medications:      Hypertension  Blood pressure well control on a combination of lisinopril hydrochlorothiazide if the cough persists consider switching to an angiotensin receptor blocker  Diagnoses and all orders for this visit:    Type 2 diabetes mellitus with hyperglycemia, with long-term current use of insulin (HCC)  -     XR chest pa & lateral; Future  -     azithromycin (ZITHROMAX) 250 mg tablet; Take 2 tablets today then 1 daily for 4 days  -     CBC and differential; Future  -     Comprehensive metabolic panel; Future  -     TSH, 3rd generation with Free T4 reflex; Future    Essential hypertension  -     XR chest pa & lateral; Future  -     azithromycin (ZITHROMAX) 250 mg tablet; Take 2 tablets today then 1 daily for 4 days  -     CBC and differential; Future  -     Comprehensive metabolic panel;  Future  -     TSH, 3rd generation with Free T4 reflex; Future    Bronchitis  -     XR chest pa & lateral; Future  -     CBC and differential; Future  -     Comprehensive metabolic panel; Future  -     TSH, 3rd generation with Free T4 reflex; Future          Subjective:      Patient ID: Fito Nunez is a 80 y o  female      Chief Complaint   Patient presents with    Cough    Diarrhea    Other     no appetite, no energy         Current Outpatient Prescriptions:     brompheniramine-pseudoephedrine-DM 30-2-10 MG/5ML syrup, Take 5 mL by mouth 4 (four) times a day as needed for cough, Disp: 120 mL, Rfl: 0    guaiFENesin (MUCINEX) 600 mg 12 hr tablet, Take 2 tablets (1,200 mg total) by mouth every 12 (twelve) hours, Disp: 20 tablet, Rfl: 0    insulin glargine (BASAGLAR KWIKPEN) 100 units/mL injection pen, Inject 30 Units under the skin daily at bedtime, Disp: 5 pen, Rfl: 0    Insulin Pen Needle (EASY TOUCH PEN NEEDLES) 31G X 8 MM MISC, by Does not apply route daily, Disp: 100 each, Rfl: 4    lidocaine viscous (XYLOCAINE) 2 % mucosal solution, Swish and spit 15 mL 4 (four) times a day as needed for mild pain, Disp: 100 mL, Rfl: 0    lisinopril-hydrochlorothiazide (PRINZIDE,ZESTORETIC) 20-12 5 MG per tablet, Take 1 tablet by mouth daily, Disp: 30 tablet, Rfl: 4    metFORMIN (GLUCOPHAGE) 500 mg tablet, Take 2 tablets (1,000 mg total) by mouth 2 (two) times a day, Disp: 120 tablet, Rfl: 0    rosuvastatin (CRESTOR) 10 MG tablet, Take 1 tablet (10 mg total) by mouth daily, Disp: 90 tablet, Rfl: 3    sitaGLIPtin (JANUVIA) 100 mg tablet, Take 1 tablet (100 mg total) by mouth daily, Disp: 30 tablet, Rfl: 0    azithromycin (ZITHROMAX) 250 mg tablet, Take 2 tablets today then 1 daily for 4 days, Disp: 6 tablet, Rfl: 0    HPI    The following portions of the patient's history were reviewed and updated as appropriate: allergies, current medications, past family history, past medical history, past social history, past surgical history and problem list     Review of Systems   Constitutional: Positive for fatigue  Negative for activity change, appetite change, chills, diaphoresis, fever and unexpected weight change  HENT: Negative for congestion, ear pain, hearing loss, mouth sores, postnasal drip, rhinorrhea, sore throat, trouble swallowing and voice change  Eyes: Negative for pain, redness and visual disturbance  Respiratory: Positive for cough  Negative for chest tightness, shortness of breath and wheezing  Cardiovascular: Negative for chest pain, palpitations and leg swelling  Gastrointestinal: Positive for diarrhea  Negative for abdominal distention, abdominal pain, blood in stool, constipation and nausea  Endocrine: Negative for cold intolerance, heat intolerance, polydipsia, polyphagia and polyuria  Genitourinary: Negative for difficulty urinating, dysuria, flank pain, frequency, hematuria and urgency  Musculoskeletal: Negative for arthralgias, back pain, gait problem, joint swelling and myalgias  Skin: Negative for color change and pallor  Neurological: Negative for dizziness, tremors, seizures, syncope, weakness, numbness and headaches  Hematological: Negative for adenopathy  Does not bruise/bleed easily  Psychiatric/Behavioral: Negative  Negative for sleep disturbance  The patient is not nervous/anxious  Objective:    /71 (BP Location: Left arm, Patient Position: Sitting, Cuff Size: Standard)   Pulse 80   Temp 97 6 °F (36 4 °C)   Resp 15   Ht 5' 1" (1 549 m)   Wt 68 3 kg (150 lb 9 6 oz)   BMI 28 46 kg/m²      Physical Exam   Constitutional: She is oriented to person, place, and time  She appears well-developed and well-nourished  HENT:   Head: Normocephalic  Right Ear: External ear normal    Left Ear: External ear normal    Nose: Nose normal    Mouth/Throat: Oropharynx is clear and moist  No oropharyngeal exudate  Throat is clear tympanic membrane clear   Eyes: Pupils are equal, round, and reactive to light  Conjunctivae and EOM are normal    Neck: Normal range of motion  Neck supple  No thyromegaly present  Cardiovascular: Normal rate, regular rhythm, normal heart sounds and intact distal pulses  Exam reveals no gallop and no friction rub  No murmur heard  S1-S2 no gallops  Extremities no edema   Pulmonary/Chest: Effort normal and breath sounds normal  No respiratory distress  She has no wheezes  She has no rales  Lungs rhonchi at the bases  No rales or wheezing   Abdominal: Soft  Bowel sounds are normal  She exhibits no distension and no mass  There is no tenderness  There is no rebound and no guarding  Abdomen soft nontender no hepatosplenomegaly   Musculoskeletal: Normal range of motion  Lymphadenopathy:     She has no cervical adenopathy  Neurological: She is alert and oriented to person, place, and time  Skin: Skin is warm and dry  Psychiatric: She has a normal mood and affect  Her behavior is normal  Judgment normal    Nursing note and vitals reviewed  Qbrexza Counseling:  I discussed with the patient the risks of Qbrexza including but not limited to headache, mydriasis, blurred vision, dry eyes, nasal dryness, dry mouth, dry throat, dry skin, urinary hesitation, and constipation.  Local skin reactions including erythema, burning, stinging, and itching can also occur.

## 2024-12-18 ENCOUNTER — TELEPHONE (OUTPATIENT)
Dept: GASTROENTEROLOGY | Facility: MEDICAL CENTER | Age: 74
End: 2024-12-18

## 2024-12-18 ENCOUNTER — PREP FOR PROCEDURE (OUTPATIENT)
Dept: GASTROENTEROLOGY | Facility: MEDICAL CENTER | Age: 74
End: 2024-12-18

## 2024-12-18 DIAGNOSIS — Z86.0100 HISTORY OF COLON POLYPS: Primary | ICD-10-CM

## 2024-12-18 NOTE — TELEPHONE ENCOUNTER
Procedure: Colonoscopy   Date: 03/06/2025  Physician performing: Dr. Jaiyeola  Location of procedure:  [place]  Instructions given to patient: Dul/Miralax   Diabetic:Yes  Clearances: N/A    Patient is Diabetic   Metformin  Basaglar  Mailing prep instructions to patients address on file

## 2025-01-28 ENCOUNTER — TELEPHONE (OUTPATIENT)
Age: 75
End: 2025-01-28

## 2025-01-28 NOTE — TELEPHONE ENCOUNTER
Procedure Confirmation sent to patient via [employer] for patient to review instructions for procedure and confirm procedure. Will attach diabetic instructions for procedure

## 2025-02-17 ENCOUNTER — TELEPHONE (OUTPATIENT)
Dept: GASTROENTEROLOGY | Facility: MEDICAL CENTER | Age: 75
End: 2025-02-17

## 2025-02-17 NOTE — TELEPHONE ENCOUNTER
Called and spoke to patient to confirm procedure for 03/06 with Dr. Jaiyeola at [place], patient has confirmed but needs to call new insurance to make sure that the procedure will be covered. Patient will call us back if needing to cancel

## 2025-02-20 ENCOUNTER — ANESTHESIA (OUTPATIENT)
Dept: ANESTHESIOLOGY | Facility: HOSPITAL | Age: 75
End: 2025-02-20

## 2025-02-20 ENCOUNTER — ANESTHESIA EVENT (OUTPATIENT)
Dept: ANESTHESIOLOGY | Facility: HOSPITAL | Age: 75
End: 2025-02-20

## 2025-03-14 ENCOUNTER — TELEPHONE (OUTPATIENT)
Dept: GASTROENTEROLOGY | Facility: MEDICAL CENTER | Age: 75
End: 2025-03-14

## 2025-03-14 NOTE — TELEPHONE ENCOUNTER
Left voicemail and requested call back     Called pt to see if wanting to reschedule procedure ( Colonoscopy) but mailbox was full. Will send a [employer] message for pt